# Patient Record
Sex: FEMALE | Race: WHITE | NOT HISPANIC OR LATINO | Employment: UNEMPLOYED | URBAN - METROPOLITAN AREA
[De-identification: names, ages, dates, MRNs, and addresses within clinical notes are randomized per-mention and may not be internally consistent; named-entity substitution may affect disease eponyms.]

---
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
## 2020-08-06 ENCOUNTER — VIRTUAL VISIT (OUTPATIENT)
Dept: SURGERY | Facility: CLINIC | Age: 20
End: 2020-08-06
Payer: COMMERCIAL

## 2020-08-06 VITALS — HEIGHT: 63 IN | BODY MASS INDEX: 36.86 KG/M2 | WEIGHT: 208 LBS

## 2020-08-06 DIAGNOSIS — K22.10 EROSIVE ESOPHAGITIS: Primary | ICD-10-CM

## 2020-08-06 RX ORDER — FERROUS GLUCONATE 324(37.5)
TABLET ORAL 2 TIMES DAILY
Status: ON HOLD | COMMUNITY
Start: 2020-01-13 | End: 2021-09-24

## 2020-08-06 RX ORDER — CALCIUM CARBONATE 500 MG/1
TABLET, CHEWABLE ORAL PRN
Status: ON HOLD | COMMUNITY
Start: 2019-09-07 | End: 2021-09-24

## 2020-08-06 RX ORDER — ONDANSETRON 4 MG/1
TABLET, FILM COATED ORAL EVERY 8 HOURS PRN
Status: ON HOLD | COMMUNITY
End: 2021-09-24

## 2020-08-06 ASSESSMENT — MIFFLIN-ST. JEOR: SCORE: 1687.61

## 2020-08-06 ASSESSMENT — PAIN SCALES - GENERAL: PAINLEVEL: SEVERE PAIN (7)

## 2020-08-06 NOTE — NURSING NOTE
"Chief Complaint   Patient presents with     Consult     New appointment.       Vitals:    08/06/20 1333   Weight: 94.3 kg (208 lb)   Height: 1.6 m (5' 3\")       Body mass index is 36.85 kg/m .                            AHMET JONES, EMT    "

## 2020-08-06 NOTE — LETTER
"8/6/2020       RE: Minerva Finch  [address]  [place] [address]     Dear Colleague,    Thank you for referring your patient, Minerva Finch, to the [facility] GENERAL SURGERY at [facility]. Please see a copy of my visit note below.    Minerva Finch is a 19 year old female who is being evaluated via a billable video visit.      The patient has been notified of following:     \"This video visit will be conducted via a call between you and your physician/provider. We have found that certain health care needs can be provided without the need for an in-person physical exam.  This service lets us provide the care you need with a video conversation.  If a prescription is necessary we can send it directly to your pharmacy.  If lab work is needed we can place an order for that and you can then stop by our lab to have the test done at a later time.    Video visits are billed at different rates depending on your insurance coverage.  Please reach out to your insurance provider with any questions.    If during the course of the call the physician/provider feels a video visit is not appropriate, you will not be charged for this service.\"    Patient has given verbal consent for Video visit? Yes  How would you like to obtain your AVS? MyChart  If you are dropped from the video visit, the video invite should be resent to: Text to cell phone: [phone]  Will anyone else be joining your video visit? No        Video-Visit Details    Type of service:  Video Visit    Video Start Time: 7:26 AM  Video End Time: 7:26 AM    Originating Location (pt. Location): Home    Distant Location (provider location):  [facility] GENERAL SURGERY     Platform used for Video Visit: Ann-Marie Gallegos MD          General Surgery Consultation Note    I was asked by Pari Tenorio to see this patient for the following problem:    CC: severe GERD.  Longstanding history; has had this since she was a " "child.    Minerva is a 20 yr old student in [place], originally from [place].    She has had severe reflux symptoms, which typically include reflux, nausea, vomiting, heartburn.  She has had a diagnosis of reflux since she was an infant and there are records in [facility] system dating back to that time.    Dr. Hair did endoscopy which is not available in records.    Dr. Chacko has also performed upper endoscopy in the past.    These have documented erosive gastritis from 25cm to 32cm (z-line).  Also have documented \"moderate\" hiatal hernia.    Has never had surgery for these.     Was referred for Nissen vs. Linx in [place], however this didn't happen due to insurance requiring surgery in [place].    Throws up everything she eats; vomits with blood.  Throws up and has blacked out.    Evaluation also included recent EGD/Barium swallow in [place].          Past Medical History:  Past Medical History:   Diagnosis Date     Abdominal pain      ADHD (attention deficit hyperactivity disorder)      Anxiety      Depression      Gastroesophageal reflux disease      Gastroesophageal reflux disease with esophagitis      Intractable vomiting with nausea      Obesity     BMI 37.33 using vitals from 2/3/17     Uncomplicated asthma      Patient Active Problem List   Diagnosis     Erosive esophagitis     Gastroesophageal reflux disease with esophagitis     Obesity with body mass index 30 or greater     Elevated transaminase level     Steatosis of liver     H/O: suicide attempt       Surgical History:  Past Surgical History:   Procedure Laterality Date     ENDOSCOPY       ESOPHAGOSCOPY, GASTROSCOPY, DUODENOSCOPY (EGD), COMBINED N/A 2/9/2017    Procedure: COMBINED ESOPHAGOSCOPY, GASTROSCOPY, DUODENOSCOPY (EGD), BIOPSY SINGLE OR MULTIPLE;  Surgeon: David Valle MD;  Location:  PEDS SEDATION      ESOPHAGOSCOPY, GASTROSCOPY, DUODENOSCOPY (EGD), COMBINED N/A 7/21/2017    Procedure: COMBINED ESOPHAGOSCOPY, GASTROSCOPY, DUODENOSCOPY " (EGD), BIOPSY SINGLE OR MULTIPLE;  Upper endoscopy with biopsy;  Surgeon: Yancy Chacko MD;  Location: [facility]      HC ESOPH/GAS REFLUX TEST W NASAL IMPED >1 HR N/A 4/3/2017    Procedure: ESOPHAGEAL IMPEDENCE FUNCTION TEST WITH 24 HOUR PH GREATER THAN 1 HOUR;  Surgeon: Yancy Chacko MD;  Location: [facility]      tonsillectomy and adenoidectomy         Medications:  Prior to Admission medications    Medication Sig Start Date End Date Taking? Authorizing Provider   calcium carbonate (TUMS) 500 MG chewable tablet  9/7/19  Yes Reported, Patient   Ferrous Gluconate 324 (37.5 Fe) MG TABS  1/13/20  Yes Reported, Patient   FLUoxetine (PROZAC) 20 MG capsule  4/16/20  Yes Reported, Patient   ondansetron (ZOFRAN) 4 MG tablet Take by mouth every 8 hours as needed for nausea   Yes Reported, Patient     Current Outpatient Medications   Medication Sig Dispense Refill     calcium carbonate (TUMS) 500 MG chewable tablet        Ferrous Gluconate 324 (37.5 Fe) MG TABS        FLUoxetine (PROZAC) 20 MG capsule        ondansetron (ZOFRAN) 4 MG tablet Take by mouth every 8 hours as needed for nausea         Allergies:  Allergies   Allergen Reactions     Omeprazole      Violent vomiting       Social History:  Social History     Socioeconomic History     Marital status: Single     Spouse name: None     Number of children: None     Years of education: None     Highest education level: None   Occupational History     None   Social Needs     Financial resource strain: None     Food insecurity     Worry: None     Inability: None     Transportation needs     Medical: None     Non-medical: None   Tobacco Use     Smoking status: Passive Smoke Exposure - Never Smoker     Smokeless tobacco: Never Used     Tobacco comment: mom smokes outside   Substance and Sexual Activity     Alcohol use: No     Alcohol/week: 0.0 standard drinks     Drug use: No     Sexual activity: None   Lifestyle     Physical  "activity     Days per week: None     Minutes per session: None     Stress: None   Relationships     Social connections     Talks on phone: None     Gets together: None     Attends Baptism service: None     Active member of club or organization: None     Attends meetings of clubs or organizations: None     Relationship status: None     Intimate partner violence     Fear of current or ex partner: None     Emotionally abused: None     Physically abused: None     Forced sexual activity: None   Other Topics Concern     None   Social History Narrative     None       Family History:  Family History   Problem Relation Age of Onset     Peptic Ulcer Disease Mother      GERD Mother      Arthritis Mother      Pancreatitis No family hx of      Migraines No family hx of        Review of Systems:  Gerd/reflux; vomiting; blacking out with these episodes.    Physical Examination:  Vital Signs: Ht 1.6 m (5' 3\")   Wt 94.3 kg (208 lb)   BMI 36.85 kg/m    HEENT: NCAT; MMM; EOMSI; PERRLA***  Lungs: Breathing unlabored***    Rest of examination deferred due to virtual visit.    Physical Exam Area of Interest:   n/a    Imaging:  Barium swallow.    Laboratory testing:  ***  Lab Results   Component Value Date    WBC 9.7 07/21/2017     Lab Results   Component Value Date    RBC 4.29 07/21/2017     Lab Results   Component Value Date    HGB 8.7 07/21/2017     Lab Results   Component Value Date    HCT 30.0 07/21/2017     No components found for: MCT  Lab Results   Component Value Date    MCV 70 07/21/2017     Lab Results   Component Value Date    MCH 20.3 07/21/2017     Lab Results   Component Value Date    MCHC 29.0 07/21/2017     Lab Results   Component Value Date    RDW 18.2 07/21/2017     Lab Results   Component Value Date     07/21/2017     Last Comprehensive Metabolic Panel:  No results found for: NA, POTASSIUM, CHLORIDE, CO2, ANIONGAP, GLC, BUN, CR, GFRESTIMATED, SHEEAB  INR/Prothrombin Time  Liver Function Studies -   Recent Labs "   Lab Test 07/21/17  1010   PROTTOTAL 6.8   ALBUMIN 3.2*   BILITOTAL 0.2   ALKPHOS 75   AST 15   ALT 27     [unfilled]    @LNK,[id number]@    Assessment:  ***    Discussion of Risks:   ***    Plan:  ***    No orders of the defined types were placed in this encounter.          Sergo Gallegos MD  Surgery  [phone] (hospital )  [phone] (clinic nurses)                  Again, thank you for allowing me to participate in the care of your patient.      Sincerely,    Sergo Gallegos MD

## 2020-08-06 NOTE — LETTER
"8/6/2020       RE: Minerva Finch  [address]  [place] [address]     Dear Colleague,    Thank you for referring your patient, Minerva Finch, to the [facility] GENERAL SURGERY at [facility]. Please see a copy of my visit note below.    Minerva Finch is a 19 year old female who is being evaluated via a billable video visit.      The patient has been notified of following:     \"This video visit will be conducted via a call between you and your physician/provider. We have found that certain health care needs can be provided without the need for an in-person physical exam.  This service lets us provide the care you need with a video conversation.  If a prescription is necessary we can send it directly to your pharmacy.  If lab work is needed we can place an order for that and you can then stop by our lab to have the test done at a later time.    Video visits are billed at different rates depending on your insurance coverage.  Please reach out to your insurance provider with any questions.    If during the course of the call the physician/provider feels a video visit is not appropriate, you will not be charged for this service.\"    Patient has given verbal consent for Video visit? Yes  How would you like to obtain your AVS? MyChart  If you are dropped from the video visit, the video invite should be resent to: Text to cell phone: [phone]  Will anyone else be joining your video visit? No  {If patient encounters technical issues they should call [phone] :[id number]}      Video-Visit Details    Type of service:  Video Visit    Video Start Time: {video visit start/end time:152948}  Video End Time: {video visit start/end time:152948}    Originating Location (pt. Location): {video visit patient location:960714::\"Home\"}    Distant Location (provider location):  [facility] SURGERY     Platform used for Video Visit: {Virtual Visit " "Platforms:908904::\"[facility]\"}    {signature options:282839}          General Surgery Consultation Note    I was asked by Pari Tenorio to see this patient for the following problem:    CC: severe GERD.  Longstanding history; has had this since she was a child.    HPI:  Location: ***  Severity: ***  Timing: ***  Duration: ***  Modifying Factors: ***  Associated Signs/Symptoms: ***      Has had problems since she was an infant.      Offered a Nissen fundoplication by surgeons in [place].  Was also offered Linx separately.      Throws up everything she eats; vomits with blood.  Throws up and blacks out.              Past Medical History:  Past Medical History:   Diagnosis Date     Abdominal pain      ADHD (attention deficit hyperactivity disorder)      Anxiety      Depression      Gastroesophageal reflux disease      Gastroesophageal reflux disease with esophagitis      Intractable vomiting with nausea      Obesity     BMI 37.33 using vitals from 2/3/17     Uncomplicated asthma      Patient Active Problem List   Diagnosis     Erosive esophagitis     Gastroesophageal reflux disease with esophagitis     Obesity with body mass index 30 or greater     Elevated transaminase level     Steatosis of liver     H/O: suicide attempt       Surgical History:  Past Surgical History:   Procedure Laterality Date     ENDOSCOPY       ESOPHAGOSCOPY, GASTROSCOPY, DUODENOSCOPY (EGD), COMBINED N/A 2/9/2017    Procedure: COMBINED ESOPHAGOSCOPY, GASTROSCOPY, DUODENOSCOPY (EGD), BIOPSY SINGLE OR MULTIPLE;  Surgeon: David Valle MD;  Location: [facility]      ESOPHAGOSCOPY, GASTROSCOPY, DUODENOSCOPY (EGD), COMBINED N/A 7/21/2017    Procedure: COMBINED ESOPHAGOSCOPY, GASTROSCOPY, DUODENOSCOPY (EGD), BIOPSY SINGLE OR MULTIPLE;  Upper endoscopy with biopsy;  Surgeon: Yancy Chacko MD;  Location: [facility]      HC ESOPH/GAS REFLUX TEST W NASAL IMPED >1 HR N/A 4/3/2017    Procedure: ESOPHAGEAL IMPEDENCE FUNCTION TEST " WITH 24 HOUR PH GREATER THAN 1 HOUR;  Surgeon: Yancy Chacko MD;  Location:  PEDS SEDATION      tonsillectomy and adenoidectomy         Medications:  Prior to Admission medications    Medication Sig Start Date End Date Taking? Authorizing Provider   calcium carbonate (TUMS) 500 MG chewable tablet  9/7/19  Yes Reported, Patient   Ferrous Gluconate 324 (37.5 Fe) MG TABS  1/13/20  Yes Reported, Patient   FLUoxetine (PROZAC) 20 MG capsule  4/16/20  Yes Reported, Patient   ondansetron (ZOFRAN) 4 MG tablet Take by mouth every 8 hours as needed for nausea   Yes Reported, Patient     Current Outpatient Medications   Medication Sig Dispense Refill     calcium carbonate (TUMS) 500 MG chewable tablet        Ferrous Gluconate 324 (37.5 Fe) MG TABS        FLUoxetine (PROZAC) 20 MG capsule        ondansetron (ZOFRAN) 4 MG tablet Take by mouth every 8 hours as needed for nausea         Allergies:  Allergies   Allergen Reactions     Omeprazole      Violent vomiting       Social History:  Social History     Socioeconomic History     Marital status: Single     Spouse name: None     Number of children: None     Years of education: None     Highest education level: None   Occupational History     None   Social Needs     Financial resource strain: None     Food insecurity     Worry: None     Inability: None     Transportation needs     Medical: None     Non-medical: None   Tobacco Use     Smoking status: Passive Smoke Exposure - Never Smoker     Smokeless tobacco: Never Used     Tobacco comment: mom smokes outside   Substance and Sexual Activity     Alcohol use: No     Alcohol/week: 0.0 standard drinks     Drug use: No     Sexual activity: None   Lifestyle     Physical activity     Days per week: None     Minutes per session: None     Stress: None   Relationships     Social connections     Talks on phone: None     Gets together: None     Attends Holiness service: None     Active member of club or organization: None  "    Attends meetings of clubs or organizations: None     Relationship status: None     Intimate partner violence     Fear of current or ex partner: None     Emotionally abused: None     Physically abused: None     Forced sexual activity: None   Other Topics Concern     None   Social History Narrative     None       Family History:  Family History   Problem Relation Age of Onset     Peptic Ulcer Disease Mother      GERD Mother      Arthritis Mother      Pancreatitis No family hx of      Migraines No family hx of        Review of Systems:  ***A 14 point review of systems was reviewed in our paper questionnaire. ***    Physical Examination:  Vital Signs: Ht 1.6 m (5' 3\")   Wt 94.3 kg (208 lb)   BMI 36.85 kg/m    HEENT: NCAT; MMM; EOMSI; PERRLA***  Lungs: Breathing unlabored***  Abdomen: soft/nontender/nondistended; incisions***    Physical Exam Area of Interest:   ***    Imaging:  ***    Laboratory testing:  ***  Lab Results   Component Value Date    WBC 9.7 07/21/2017     Lab Results   Component Value Date    RBC 4.29 07/21/2017     Lab Results   Component Value Date    HGB 8.7 07/21/2017     Lab Results   Component Value Date    HCT 30.0 07/21/2017     No components found for: MCT  Lab Results   Component Value Date    MCV 70 07/21/2017     Lab Results   Component Value Date    MCH 20.3 07/21/2017     Lab Results   Component Value Date    MCHC 29.0 07/21/2017     Lab Results   Component Value Date    RDW 18.2 07/21/2017     Lab Results   Component Value Date     07/21/2017     Last Comprehensive Metabolic Panel:  No results found for: NA, POTASSIUM, CHLORIDE, CO2, ANIONGAP, GLC, BUN, CR, GFRESTIMATED, SHEEBA  INR/Prothrombin Time  Liver Function Studies -   Recent Labs   Lab Test 07/21/17  1010   PROTTOTAL 6.8   ALBUMIN 3.2*   BILITOTAL 0.2   ALKPHOS 75   AST 15   ALT 27     [unfilled]    @LNK,[id number]@    Assessment:  ***    Discussion of Risks:   ***    Plan:  ***    No orders of the defined types were " placed in this encounter.          Sergo Gallegos MD  Surgery  [phone] (hospital )  [phone] (clinic nurses)                  Again, thank you for allowing me to participate in the care of your patient.      Sincerely,    Sergo Gallegos MD

## 2020-08-06 NOTE — LETTER
"8/6/2020      RE: Minerva Finch  [address] [address]       Minerva Finch is a 19 year old female who is being evaluated via a billable video visit.      The patient has been notified of following:     \"This video visit will be conducted via a call between you and your physician/provider. We have found that certain health care needs can be provided without the need for an in-person physical exam.  This service lets us provide the care you need with a video conversation.  If a prescription is necessary we can send it directly to your pharmacy.  If lab work is needed we can place an order for that and you can then stop by our lab to have the test done at a later time.    Video visits are billed at different rates depending on your insurance coverage.  Please reach out to your insurance provider with any questions.    If during the course of the call the physician/provider feels a video visit is not appropriate, you will not be charged for this service.\"    Patient has given verbal consent for Video visit? Yes  How would you like to obtain your AVS? MyChart  If you are dropped from the video visit, the video invite should be resent to: Text to cell phone: [phone]  Will anyone else be joining your video visit? No        Video-Visit Details    Type of service:  Video Visit    Video Start Time: 7:26 AM  Video End Time: 7:26 AM    Originating Location (pt. Location): Home    Distant Location (provider location):  [facility] GENERAL SURGERY     Platform used for Video Visit: [employer]    Sergo Gallegos MD          General Surgery Consultation Note    I was asked by Pari Tenorio to see this patient for the following problem:    CC: severe GERD.  Longstanding history; has had this since she was a child.    Minerva is a 20 yr old student in [place], originally from [place].    She has had severe reflux symptoms, which typically include reflux, nausea, vomiting, heartburn.  She has had a diagnosis " "of reflux since she was an infant and there are records in [facility] system dating back to that time.    Dr. Hair did endoscopy which is not available in records.    Dr. Chacko has also performed upper endoscopy in the past.    These have documented erosive gastritis from 25cm to 32cm (z-line).  Also have documented \"moderate\" hiatal hernia.    Has never had surgery for these.     Was referred for Nissen vs. Linx in [place], however this didn't happen due to insurance requiring surgery in [place].    Throws up everything she eats; vomits with blood.  Throws up and has blacked out.    Evaluation also included recent EGD/Barium swallow in [place].          Past Medical History:  Past Medical History:   Diagnosis Date     Abdominal pain      ADHD (attention deficit hyperactivity disorder)      Anxiety      Depression      Gastroesophageal reflux disease      Gastroesophageal reflux disease with esophagitis      Intractable vomiting with nausea      Obesity     BMI 37.33 using vitals from 2/3/17     Uncomplicated asthma      Patient Active Problem List   Diagnosis     Erosive esophagitis     Gastroesophageal reflux disease with esophagitis     Obesity with body mass index 30 or greater     Elevated transaminase level     Steatosis of liver     H/O: suicide attempt       Surgical History:  Past Surgical History:   Procedure Laterality Date     ENDOSCOPY       ESOPHAGOSCOPY, GASTROSCOPY, DUODENOSCOPY (EGD), COMBINED N/A 2/9/2017    Procedure: COMBINED ESOPHAGOSCOPY, GASTROSCOPY, DUODENOSCOPY (EGD), BIOPSY SINGLE OR MULTIPLE;  Surgeon: David Valle MD;  [facility]: [facility]      ESOPHAGOSCOPY, GASTROSCOPY, DUODENOSCOPY (EGD), COMBINED N/A 7/21/2017    Procedure: COMBINED ESOPHAGOSCOPY, GASTROSCOPY, DUODENOSCOPY (EGD), BIOPSY SINGLE OR MULTIPLE;  Upper endoscopy with biopsy;  Surgeon: Yancy Chacko MD;  Location: [facility]      HC ESOPH/GAS REFLUX TEST W NASAL IMPED >1 HR N/A 4/3/2017 "    Procedure: ESOPHAGEAL IMPEDENCE FUNCTION TEST WITH 24 HOUR PH GREATER THAN 1 HOUR;  Surgeon: Yancy Chacko MD;  Location: [facility] SEDATION      tonsillectomy and adenoidectomy         Medications:  Prior to Admission medications    Medication Sig Start Date End Date Taking? Authorizing Provider   calcium carbonate (TUMS) 500 MG chewable tablet  9/7/19  Yes Reported, Patient   Ferrous Gluconate 324 (37.5 Fe) MG TABS  1/13/20  Yes Reported, Patient   FLUoxetine (PROZAC) 20 MG capsule  4/16/20  Yes Reported, Patient   ondansetron (ZOFRAN) 4 MG tablet Take by mouth every 8 hours as needed for nausea   Yes Reported, Patient     Current Outpatient Medications   Medication Sig Dispense Refill     calcium carbonate (TUMS) 500 MG chewable tablet        Ferrous Gluconate 324 (37.5 Fe) MG TABS        FLUoxetine (PROZAC) 20 MG capsule        ondansetron (ZOFRAN) 4 MG tablet Take by mouth every 8 hours as needed for nausea         Allergies:  Allergies   Allergen Reactions     Omeprazole      Violent vomiting       Social History:  Social History     Socioeconomic History     Marital status: Single     Spouse name: None     Number of children: None     Years of education: None     Highest education level: None   Occupational History     None   Social Needs     Financial resource strain: None     Food insecurity     Worry: None     Inability: None     Transportation needs     Medical: None     Non-medical: None   Tobacco Use     Smoking status: Passive Smoke Exposure - Never Smoker     Smokeless tobacco: Never Used     Tobacco comment: mom smokes outside   Substance and Sexual Activity     Alcohol use: No     Alcohol/week: 0.0 standard drinks     Drug use: No     Sexual activity: None   Lifestyle     Physical activity     Days per week: None     Minutes per session: None     Stress: None   Relationships     Social connections     Talks on phone: None     Gets together: None     Attends Oriental orthodox service:  "None     Active member of club or organization: None     Attends meetings of clubs or organizations: None     Relationship status: None     Intimate partner violence     Fear of current or ex partner: None     Emotionally abused: None     Physically abused: None     Forced sexual activity: None   Other Topics Concern     None   Social History Narrative     None       Family History:  Family History   Problem Relation Age of Onset     Peptic Ulcer Disease Mother      GERD Mother      Arthritis Mother      Pancreatitis No family hx of      Migraines No family hx of        Review of Systems:  Gerd/reflux; vomiting; blacking out with these episodes.    Physical Examination:  Vital Signs: Ht 1.6 m (5' 3\")   Wt 94.3 kg (208 lb)   BMI 36.85 kg/m    HEENT: NCAT; MMM; EOMSI; PERRLA  Lungs: Breathing unlabored    Rest of examination deferred due to virtual visit.    Physical Exam Area of Interest:   n/a    Imaging:  Barium swallow.    Laboratory testing:  Lab Results   Component Value Date    WBC 9.7 07/21/2017     Lab Results   Component Value Date    RBC 4.29 07/21/2017     Lab Results   Component Value Date    HGB 8.7 07/21/2017     Lab Results   Component Value Date    HCT 30.0 07/21/2017     No components found for: MCT  Lab Results   Component Value Date    MCV 70 07/21/2017     Lab Results   Component Value Date    MCH 20.3 07/21/2017     Lab Results   Component Value Date    MCHC 29.0 07/21/2017     Lab Results   Component Value Date    RDW 18.2 07/21/2017     Lab Results   Component Value Date     07/21/2017     Last Comprehensive Metabolic Panel:  No results found for: NA, POTASSIUM, CHLORIDE, CO2, ANIONGAP, GLC, BUN, CR, GFRESTIMATED, SHEEBA  INR/Prothrombin Time  Liver Function Studies -   Recent Labs   Lab Test 07/21/17  1010   PROTTOTAL 6.8   ALBUMIN 3.2*   BILITOTAL 0.2   ALKPHOS 75   AST 15   ALT 27     [unfilled]    @LNK,[id number]@    Assessment:  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    Has a good size hiatal hernia.    Not responsive to acid blocker therapy.    Has been persistent medically since she was a young child, before she developed obesity as a medical problem.    Of note, she has had BMI above 40; she has lost weight recently.  Body mass index is 36.85 kg/m . currently.      Plan:  Will repeat endoscopy; will try to group evaluation for Minerva so that her travel requirements back to [place] (currently in [place]) is limited.    Option of gastric bypass and of Nissen discussed; will also review her case at esophageal conference and LINX is less of an option due to obesity history.    Risks of gastric bypass are NOT minimal and this option would have to be carefully considered.  This is one of the few patients now that I would consider this for due to the extreme esophagitis.  Sleeve as an intermediary could be considered; some will respond to this procedure though typically not considered a primary procedure for GERD.    No orders of the defined types were placed in this encounter.    Sergo Gallegos MD  Surgery  [phone] (hospital )  [phone] (clinic nurses)

## 2020-08-06 NOTE — PROGRESS NOTES
"Minerva Finch is a 19 year old female who is being evaluated via a billable video visit.      The patient has been notified of following:     \"This video visit will be conducted via a call between you and your physician/provider. We have found that certain health care needs can be provided without the need for an in-person physical exam.  This service lets us provide the care you need with a video conversation.  If a prescription is necessary we can send it directly to your pharmacy.  If lab work is needed we can place an order for that and you can then stop by our lab to have the test done at a later time.    Video visits are billed at different rates depending on your insurance coverage.  Please reach out to your insurance provider with any questions.    If during the course of the call the physician/provider feels a video visit is not appropriate, you will not be charged for this service.\"    Patient has given verbal consent for Video visit? Yes  How would you like to obtain your AVS? MyChart  If you are dropped from the video visit, the video invite should be resent to: Text to cell phone: [phone]  Will anyone else be joining your video visit? No        Video-Visit Details    Type of service:  Video Visit    Video Start Time: 7:26 AM  Video End Time: 7:26 AM    Originating Location (pt. Location): Home    Distant Location (provider location):  [facility] GENERAL SURGERY     Platform used for Video Visit: The Kernel    Sergo Gallegos MD          General Surgery Consultation Note    I was asked by Pari Tenorio to see this patient for the following problem:    CC: severe GERD.  Longstanding history; has had this since she was a child.    Minerva is a 20 yr old student in [place], originally from [place].    She has had severe reflux symptoms, which typically include reflux, nausea, vomiting, heartburn.  She has had a diagnosis of reflux since she was an infant and there are records in [facility] system dating " "back to that time.    Dr. Hair did endoscopy which is not available in records.    Dr. Chacko has also performed upper endoscopy in the past.    These have documented erosive gastritis from 25cm to 32cm (z-line).  Also have documented \"moderate\" hiatal hernia.    Has never had surgery for these.     Was referred for Nissen vs. Linx in [place], however this didn't happen due to insurance requiring surgery in [place].    Throws up everything she eats; vomits with blood.  Throws up and has blacked out.    Evaluation also included recent EGD/Barium swallow in [place].          Past Medical History:  Past Medical History:   Diagnosis Date     Abdominal pain      ADHD (attention deficit hyperactivity disorder)      Anxiety      Depression      Gastroesophageal reflux disease      Gastroesophageal reflux disease with esophagitis      Intractable vomiting with nausea      Obesity     BMI 37.33 using vitals from 2/3/17     Uncomplicated asthma      Patient Active Problem List   Diagnosis     Erosive esophagitis     Gastroesophageal reflux disease with esophagitis     Obesity with body mass index 30 or greater     Elevated transaminase level     Steatosis of liver     H/O: suicide attempt       Surgical History:  Past Surgical History:   Procedure Laterality Date     ENDOSCOPY       ESOPHAGOSCOPY, GASTROSCOPY, DUODENOSCOPY (EGD), COMBINED N/A 2/9/2017    Procedure: COMBINED ESOPHAGOSCOPY, GASTROSCOPY, DUODENOSCOPY (EGD), BIOPSY SINGLE OR MULTIPLE;  Surgeon: David Valle MD;  [facility]      ESOPHAGOSCOPY, GASTROSCOPY, DUODENOSCOPY (EGD), COMBINED N/A 7/21/2017    Procedure: COMBINED ESOPHAGOSCOPY, GASTROSCOPY, DUODENOSCOPY (EGD), BIOPSY SINGLE OR MULTIPLE;  Upper endoscopy with biopsy;  Surgeon: Yancy Chacko MD;  [facility]      HC ESOPH/GAS REFLUX TEST W NASAL IMPED >1 HR N/A 4/3/2017    Procedure: ESOPHAGEAL IMPEDENCE FUNCTION TEST WITH 24 HOUR PH GREATER THAN 1 " HOUR;  Surgeon: Yancy Chacko MD;  Location: [facility]      tonsillectomy and adenoidectomy         Medications:  Prior to Admission medications    Medication Sig Start Date End Date Taking? Authorizing Provider   calcium carbonate (TUMS) 500 MG chewable tablet  9/7/19  Yes Reported, Patient   Ferrous Gluconate 324 (37.5 Fe) MG TABS  1/13/20  Yes Reported, Patient   FLUoxetine (PROZAC) 20 MG capsule  4/16/20  Yes Reported, Patient   ondansetron (ZOFRAN) 4 MG tablet Take by mouth every 8 hours as needed for nausea   Yes Reported, Patient     Current Outpatient Medications   Medication Sig Dispense Refill     calcium carbonate (TUMS) 500 MG chewable tablet        Ferrous Gluconate 324 (37.5 Fe) MG TABS        FLUoxetine (PROZAC) 20 MG capsule        ondansetron (ZOFRAN) 4 MG tablet Take by mouth every 8 hours as needed for nausea         Allergies:  Allergies   Allergen Reactions     Omeprazole      Violent vomiting       Social History:  Social History     Socioeconomic History     Marital status: Single     Spouse name: None     Number of children: None     Years of education: None     Highest education level: None   Occupational History     None   Social Needs     Financial resource strain: None     Food insecurity     Worry: None     Inability: None     Transportation needs     Medical: None     Non-medical: None   Tobacco Use     Smoking status: Passive Smoke Exposure - Never Smoker     Smokeless tobacco: Never Used     Tobacco comment: mom smokes outside   Substance and Sexual Activity     Alcohol use: No     Alcohol/week: 0.0 standard drinks     Drug use: No     Sexual activity: None   Lifestyle     Physical activity     Days per week: None     Minutes per session: None     Stress: None   Relationships     Social connections     Talks on phone: None     Gets together: None     Attends Jain service: None     Active member of club or organization: None     Attends meetings of clubs  "or organizations: None     Relationship status: None     Intimate partner violence     Fear of current or ex partner: None     Emotionally abused: None     Physically abused: None     Forced sexual activity: None   Other Topics Concern     None   Social History Narrative     None       Family History:  Family History   Problem Relation Age of Onset     Peptic Ulcer Disease Mother      GERD Mother      Arthritis Mother      Pancreatitis No family hx of      Migraines No family hx of        Review of Systems:  Gerd/reflux; vomiting; blacking out with these episodes.    Physical Examination:  Vital Signs: Ht 1.6 m (5' 3\")   Wt 94.3 kg (208 lb)   BMI 36.85 kg/m    HEENT: NCAT; MMM; EOMSI; PERRLA  Lungs: Breathing unlabored    Rest of examination deferred due to virtual visit.    Physical Exam Area of Interest:   n/a    Imaging:  Barium swallow.    Laboratory testing:  Lab Results   Component Value Date    WBC 9.7 07/21/2017     Lab Results   Component Value Date    RBC 4.29 07/21/2017     Lab Results   Component Value Date    HGB 8.7 07/21/2017     Lab Results   Component Value Date    HCT 30.0 07/21/2017     No components found for: MCT  Lab Results   Component Value Date    MCV 70 07/21/2017     Lab Results   Component Value Date    MCH 20.3 07/21/2017     Lab Results   Component Value Date    MCHC 29.0 07/21/2017     Lab Results   Component Value Date    RDW 18.2 07/21/2017     Lab Results   Component Value Date     07/21/2017     Last Comprehensive Metabolic Panel:  No results found for: NA, POTASSIUM, CHLORIDE, CO2, ANIONGAP, GLC, BUN, CR, GFRESTIMATED, SHEEBA  INR/Prothrombin Time  Liver Function Studies -   Recent Labs   Lab Test 07/21/17  1010   PROTTOTAL 6.8   ALBUMIN 3.2*   BILITOTAL 0.2   ALKPHOS 75   AST 15   ALT 27     [unfilled]    @[id number]@    Assessment:  20 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    Has a good size hiatal hernia.    Not responsive to " acid blocker therapy.    Has been persistent medically since she was a young child, before she developed obesity as a medical problem.    Of note, she has had BMI above 40; she has lost weight recently.  Body mass index is 36.85 kg/m . currently.      Plan:  Will repeat endoscopy; will try to group evaluation for Minerva so that her travel requirements back to [place] (currently in [place]) is limited.    Option of gastric bypass and of Nissen discussed; will also review her case at esophageal conference and LINX is less of an option due to obesity history.    Risks of gastric bypass are NOT minimal and this option would have to be carefully considered.  This is one of the few patients now that I would consider this for due to the extreme esophagitis.  Sleeve as an intermediary could be considered; some will respond to this procedure though typically not considered a primary procedure for GERD.    No orders of the defined types were placed in this encounter.    Sergo Gallegos MD  Surgery  [phone] (hospital )  [phone] (clinic nurses)

## 2020-08-11 ENCOUNTER — TELEPHONE (OUTPATIENT)
Dept: SURGERY | Facility: CLINIC | Age: 20
End: 2020-08-11

## 2020-08-11 NOTE — TELEPHONE ENCOUNTER
Patient called to schedule surgery, seen 8/6 in clinic. Notes were supposed to be faxed from [facility] for Dr. Gallegos to review.     There is no case request in yet and no clinic note to know what to schedule. I told her I would see if the notes were faxed and request Dr. Gallegos to do dictation. I will call her back as soon as I have some answers. [phone].

## 2020-08-12 ENCOUNTER — TELEPHONE (OUTPATIENT)
Dept: SURGERY | Facility: CLINIC | Age: 20
End: 2020-08-12

## 2020-08-12 NOTE — TELEPHONE ENCOUNTER
Per Dr. Gallegos, patient will be discussed at Esophageal Conference next Tuesday. Discussion will include possibility of Linx procedure versus Nissen fundoplication versus sleeve gastrectomy versus Rashida-en-Y gastric bypass. VM message left.

## 2020-08-17 ENCOUNTER — TELEPHONE (OUTPATIENT)
Dept: ENDOCRINOLOGY | Facility: CLINIC | Age: 20
End: 2020-08-17

## 2020-08-17 ENCOUNTER — MYC MEDICAL ADVICE (OUTPATIENT)
Dept: SURGERY | Facility: CLINIC | Age: 20
End: 2020-08-17

## 2020-08-17 DIAGNOSIS — K21.00 GASTROESOPHAGEAL REFLUX DISEASE WITH ESOPHAGITIS: ICD-10-CM

## 2020-08-17 DIAGNOSIS — K22.10 EROSIVE ESOPHAGITIS: Primary | ICD-10-CM

## 2020-08-17 RX ORDER — HYOSCYAMINE SULFATE 0.125 MG
0.125-0.25 TABLET ORAL EVERY 4 HOURS PRN
Qty: 40 TABLET | Refills: 1 | Status: SHIPPED | OUTPATIENT
Start: 2020-08-17 | End: 2020-08-17

## 2020-08-17 RX ORDER — HYOSCYAMINE SULFATE 0.125 MG
0.125-0.25 TABLET ORAL EVERY 4 HOURS PRN
Qty: 40 TABLET | Refills: 1 | Status: SHIPPED | OUTPATIENT
Start: 2020-08-17 | End: 2020-08-24 | Stop reason: SINTOL

## 2020-08-17 NOTE — TELEPHONE ENCOUNTER
Patient states she has been vomiting today with anything she eats or drinks (cold or lukewarm fluids).  Has tried chewable Pepcid without relief. Is allergic to PPI medications.  Is taking Zofran for the nausea regularly.  Has tried Carafate in the past with minimal decrease in symptoms.  Has had a few emesis episodes with small amount of blood streaks.  States she feels like esophagus is irritated. Has tried a lidocaine gargle with some relief.    States her current weight is 197.  Down from her weight at her first visit (210).    Encouraged patient to try warmer fluids such as a genia lemon tea which may promote relaxation of esophagus and stomach.  Can also try hard peppermint candy to suck on but not chew and swallow.    Patient is currently at work and will try other suggestions after she gets off work today.  Will call tomorrow with update.  Patient advised to seek immediate help at ED if she starts vomiting blood.    Intake information routed to Kaya Richmond NP for additional orders.

## 2020-08-18 ENCOUNTER — TELEPHONE (OUTPATIENT)
Dept: SURGERY | Facility: CLINIC | Age: 20
End: 2020-08-18

## 2020-08-18 NOTE — TELEPHONE ENCOUNTER
Minerva Finch was discussed at Esophageal Conference on August 18, 2020.      Recommended Plan:   Consultation appointment with Dr. Ten Callahan MD, Esophagologist and Repeat GI studies with [facility] GI department.    Patient will be contacted by Loraine Lane RN regarding plan of care.  Follow-up appointments to be arranged by Martita Romo RN - GI Department.    Phone Call to Patient: Left message for patient to return call regarding decision from esophageal conference.

## 2020-08-21 ENCOUNTER — TELEPHONE (OUTPATIENT)
Dept: SURGERY | Facility: CLINIC | Age: 20
End: 2020-08-21

## 2020-08-21 ENCOUNTER — PATIENT OUTREACH (OUTPATIENT)
Dept: GASTROENTEROLOGY | Facility: CLINIC | Age: 20
End: 2020-08-21

## 2020-08-21 NOTE — TELEPHONE ENCOUNTER
Return Call to patient.  Patient left message stating she was having symptoms while taking the Levsin.  States she became short of breath and felt very anxious.  Patient also stated the medication was helping with the n/v. Left message for patient instructing her to stop taking the Levsin.

## 2020-08-21 NOTE — PROGRESS NOTES
Reached out to pt to schedule virtual visit with Dr. Callahan per MDs recommendation for pt following esophageal conference.  Left  for call back.

## 2020-08-24 ENCOUNTER — PATIENT OUTREACH (OUTPATIENT)
Dept: GASTROENTEROLOGY | Facility: CLINIC | Age: 20
End: 2020-08-24

## 2020-08-24 ENCOUNTER — TELEPHONE (OUTPATIENT)
Dept: SURGERY | Facility: CLINIC | Age: 20
End: 2020-08-24

## 2020-08-24 NOTE — TELEPHONE ENCOUNTER
Patient called wondering what tests she was going to have to repeat with GI.  Informed patient I would reach out to GI to connect with her to see if they could provide the information.     Patient states she is disappointed that the Levsin caused SOB because it helped curb her GI symptoms.  Is inquiring if there is a different medication.  Request forwarded to Kaya Richmond NP.  Will follow-up with patient if a different medication available.

## 2020-08-24 NOTE — TELEPHONE ENCOUNTER
RECORDS RECEIVED FROM: [facility] General Surgery- Dr. Sergo Gallegos    DATE RECEIVED: 2020   NOTES STATUS DETAILS   OFFICE NOTE from referring provider Internal 2020 Office visit with Dr. Gallegos    OFFICE NOTE from other specialist Internal/ Care Everywhere 3/14/17 Office visit with Dr. Frannie Rausch ([facility])     2/3/17 Office visit with Dr. Yancy Chacko ([facility])    1/3/17 Office visit with Dr. Stuart Soria ([facility])    DISCHARGE SUMMARY from hospital N/A    OPERATIVE REPORT Received  Barium Swallow: 19     MEDICATION LIST Internal         ENDOSCOPY  Received EGD: 19 ([facility] [place])   EGD: 16 ([facility])    COLONOSCOPY N/A    ERCP N/A    EUS N/A    STOOL TESTING N/A    PERTINENT LABS Received    PATHOLOGY REPORTS (RELATED) Care Everywhere/ Internal  16 ([facility])     17, 17   IMAGING (CT, MRI, EGD) Received/ Internal  NM Gastric Emptying: 3/14/17  US Abdomen: 17     [facility]:  - NM Gastric Emptyin19    [facility]:  - XR Upper GI: 16 (in PACS)      REFERRAL INFORMATION    Date referral was placed: 2020   Date all records received:    Date records were scanned into EPIC:    Date records were sent to Provider to review:    Date and recommendation received from provider:  LETTER SENT  SCHEDULE APPOINTMENT   Date patient was contacted to schedule: 2020     Action 2020 2:27pm -Bhao    Action Taken Fax requests for images sent to:  - [facility] ([phone]); [employer]       Action 2020 2:16pm -Bhao   Action Taken Fax request sent to [facility] for image.      Action 2020 11:41am -Bhao    Action Taken Called [facility]; reached automated message and it gave a fax number of [phone].  Re-fax request to [phone].     [employer]     Action 20 10:44 AM - Loraine    Action Taken  Imaging disc received from [facility] and sent to [facility] to be uploaded into PACs.

## 2020-09-08 ENCOUNTER — PATIENT OUTREACH (OUTPATIENT)
Dept: GASTROENTEROLOGY | Facility: CLINIC | Age: 20
End: 2020-09-08

## 2020-09-08 DIAGNOSIS — K22.10 EROSIVE ESOPHAGITIS: Primary | ICD-10-CM

## 2020-09-08 DIAGNOSIS — K21.00 GASTROESOPHAGEAL REFLUX DISEASE WITH ESOPHAGITIS: ICD-10-CM

## 2020-09-08 NOTE — PROGRESS NOTES
Reached out to pt to facilitate coordination of tests per John Gallegos and [place]  In communication with Endoscopy scheduling     EGD with biopsy: scheduling in process   High resolution manometry and 24hr pH impedance OFF therapy:   scheduling in process   Barium esophagram with a barium tablet:   scheduling in process   Telemed visit: 9/18 with Dr Callahan

## 2020-09-09 ENCOUNTER — HOSPITAL ENCOUNTER (OUTPATIENT)
Facility: CLINIC | Age: 20
End: 2020-09-09
Attending: INTERNAL MEDICINE | Admitting: INTERNAL MEDICINE

## 2020-09-09 ENCOUNTER — TELEPHONE (OUTPATIENT)
Dept: GASTROENTEROLOGY | Facility: CLINIC | Age: 20
End: 2020-09-09

## 2020-09-09 DIAGNOSIS — Z11.59 ENCOUNTER FOR SCREENING FOR OTHER VIRAL DISEASES: Primary | ICD-10-CM

## 2020-09-09 NOTE — TELEPHONE ENCOUNTER
Patient is scheduled for Manometry/EGd with Dr. Desean Callahan    Spoke with: patient    Date of Procedure: 10-28 and 10-29    Location: [address] [facility]    Sedation Type none/ MAC    Informed patient they will need an adult  yes    Informed Patient of COVID Test Requirement yes    Preferred Pharmacy for Pre Prescription chart    Confirmed Nurse will call to complete assessment yes    Additional comments: patient coming from [place]

## 2020-09-10 ENCOUNTER — TELEPHONE (OUTPATIENT)
Dept: GASTROENTEROLOGY | Facility: CLINIC | Age: 20
End: 2020-09-10

## 2020-09-10 NOTE — TELEPHONE ENCOUNTER
Patient is scheduled for EGD with Dr. DRISCOLL    Spoke with: patient    Date of Procedure: 10-28-20    Location: asc    Sedation Type mac    Informed patient they will need an adult  yes    Informed Patient of COVID Test Requirement yes    Preferred Pharmacy for Pre Prescription chart    Confirmed Nurse will call to complete assessment yes    Additional comments: Martita assisted with all the coordinating of the appts for this patient.

## 2020-09-10 NOTE — TELEPHONE ENCOUNTER
Melanie Alejandro 9/10/20 1:53PM   Action Taken CSS received CD from [facility]- CD given to Palak to upload

## 2020-09-11 ENCOUNTER — PATIENT OUTREACH (OUTPATIENT)
Dept: GASTROENTEROLOGY | Facility: CLINIC | Age: 20
End: 2020-09-11

## 2020-09-11 NOTE — PROGRESS NOTES
Reached out to pt to confirm appointments including esophagram and assist in scheduling covid test for tests week of 10/26. Left  for call back.

## 2020-09-18 ENCOUNTER — PRE VISIT (OUTPATIENT)
Dept: GASTROENTEROLOGY | Facility: CLINIC | Age: 20
End: 2020-09-18

## 2020-09-18 PROBLEM — K44.9 HIATAL HERNIA: Status: ACTIVE | Noted: 2019-09-09

## 2020-09-19 PROBLEM — R11.10 REGURGITATION OF FOOD: Status: ACTIVE | Noted: 2020-09-19

## 2020-09-19 PROBLEM — R11.10 POSTPRANDIAL VOMITING: Status: ACTIVE | Noted: 2020-09-19

## 2020-09-28 ENCOUNTER — TELEPHONE (OUTPATIENT)
Dept: GASTROENTEROLOGY | Facility: CLINIC | Age: 20
End: 2020-09-28

## 2020-09-28 NOTE — TELEPHONE ENCOUNTER
Prior Authorization Retail Medication Request    Medication/Dose: RABEprazole (ACIPHEX) 20 MG EC tablet   Take 20 mg twice daily, taken 30-60 minutes prior to meals  ICD code (if different than what is on RX):    Previously Tried and Failed:    Rationale:    erosive esophagitis, GERD   Therapies tried:  -- Side effects to omeprazole and related medications - reports violent vomiting, can't get out of bed, blacks out due to feeling poorly    Insurance Name: [employer] [place]    Insurance ID: [id number]       Pharmacy Information (if different than what is on RX)  Name:    Phone:

## 2020-09-28 NOTE — TELEPHONE ENCOUNTER
Central Prior Authorization Team   Phone: [phone]      PA Initiation    Medication: RABEprazole (ACIPHEX) 20 MG EC tablet   Insurance Company: [employer]* - Phone [phone] Fax [phone]  Pharmacy Filling the Rx: [id number] - [place], [place] - [address] AT [facility] [address]  Filling Pharmacy Phone: [phone]  Filling Pharmacy Fax:    Start Date: 9/28/2020

## 2020-09-29 NOTE — TELEPHONE ENCOUNTER
Prior Authorization Approval    Authorization Effective Date: 6/29/2020  Authorization Expiration Date: 3/28/2021  Medication: RABEprazole (ACIPHEX) 20 MG EC tablet - APPROVED  Approved Dose/Quantity:   Reference #:     Insurance Company: [employer] - Phone [phone] Fax [phone]  Expected CoPay:       CoPay Card Available:      Foundation Assistance Needed:    Which Pharmacy is filling the prescription (Not needed for infusion/clinic administered): [id number] - [place], [place] - [address] AT [facility] [address]  Pharmacy Notified: Yes-Left message with approval, process, fill, and notify patient when ready  Patient Notified: No

## 2020-10-19 ENCOUNTER — MEDICAL CORRESPONDENCE (OUTPATIENT)
Dept: HEALTH INFORMATION MANAGEMENT | Facility: CLINIC | Age: 20
End: 2020-10-19

## 2020-10-23 ENCOUNTER — MYC MEDICAL ADVICE (OUTPATIENT)
Dept: SURGERY | Facility: AMBULATORY SURGERY CENTER | Age: 20
End: 2020-10-23

## 2020-10-26 ENCOUNTER — HOSPITAL ENCOUNTER (OUTPATIENT)
Dept: NUCLEAR MEDICINE | Facility: CLINIC | Age: 20
Setting detail: NUCLEAR MEDICINE
Discharge: HOME | End: 2020-10-26
Attending: PHYSICIAN ASSISTANT | Admitting: PHYSICIAN ASSISTANT
Payer: COMMERCIAL

## 2020-10-26 DIAGNOSIS — Z11.59 ENCOUNTER FOR SCREENING FOR OTHER VIRAL DISEASES: ICD-10-CM

## 2020-10-26 DIAGNOSIS — K21.00 GASTROESOPHAGEAL REFLUX DISEASE WITH ESOPHAGITIS: ICD-10-CM

## 2020-10-26 DIAGNOSIS — R11.10 POSTPRANDIAL VOMITING: ICD-10-CM

## 2020-10-26 PROCEDURE — 78264 GASTRIC EMPTYING IMG STUDY: CPT | Mod: 26 | Performed by: RADIOLOGY

## 2020-10-26 PROCEDURE — A9541 TC99M SULFUR COLLOID: HCPCS | Performed by: PHYSICIAN ASSISTANT

## 2020-10-26 PROCEDURE — 78264 GASTRIC EMPTYING IMG STUDY: CPT

## 2020-10-26 PROCEDURE — U0003 INFECTIOUS AGENT DETECTION BY NUCLEIC ACID (DNA OR RNA); SEVERE ACUTE RESPIRATORY SYNDROME CORONAVIRUS 2 (SARS-COV-2) (CORONAVIRUS DISEASE [COVID-19]), AMPLIFIED PROBE TECHNIQUE, MAKING USE OF HIGH THROUGHPUT TECHNOLOGIES AS DESCRIBED BY CMS-2020-01-R: HCPCS | Performed by: INTERNAL MEDICINE

## 2020-10-26 PROCEDURE — 343N000001 HC RX 343: Performed by: PHYSICIAN ASSISTANT

## 2020-10-26 RX ADMIN — Medication 2 MCI.: at 09:16

## 2020-10-27 ENCOUNTER — ANCILLARY PROCEDURE (OUTPATIENT)
Dept: GENERAL RADIOLOGY | Facility: CLINIC | Age: 20
End: 2020-10-27
Attending: INTERNAL MEDICINE
Payer: COMMERCIAL

## 2020-10-27 DIAGNOSIS — K22.10 EROSIVE ESOPHAGITIS: ICD-10-CM

## 2020-10-27 DIAGNOSIS — K21.00 GASTROESOPHAGEAL REFLUX DISEASE WITH ESOPHAGITIS: ICD-10-CM

## 2020-10-27 LAB
SARS-COV-2 RNA SPEC QL NAA+PROBE: NOT DETECTED
SPECIMEN SOURCE: NORMAL

## 2020-10-27 PROCEDURE — 74220 X-RAY XM ESOPHAGUS 1CNTRST: CPT | Mod: GC | Performed by: RADIOLOGY

## 2020-10-28 ENCOUNTER — ANESTHESIA (OUTPATIENT)
Dept: SURGERY | Facility: AMBULATORY SURGERY CENTER | Age: 20
End: 2020-10-28

## 2020-10-28 ENCOUNTER — HOSPITAL ENCOUNTER (OUTPATIENT)
Facility: AMBULATORY SURGERY CENTER | Age: 20
Discharge: HOME | End: 2020-10-28
Attending: INTERNAL MEDICINE | Admitting: INTERNAL MEDICINE
Payer: COMMERCIAL

## 2020-10-28 ENCOUNTER — ANESTHESIA EVENT (OUTPATIENT)
Dept: SURGERY | Facility: AMBULATORY SURGERY CENTER | Age: 20
End: 2020-10-28

## 2020-10-28 VITALS
RESPIRATION RATE: 18 BRPM | TEMPERATURE: 96.9 F | HEIGHT: 63 IN | HEART RATE: 61 BPM | DIASTOLIC BLOOD PRESSURE: 76 MMHG | WEIGHT: 200 LBS | OXYGEN SATURATION: 99 % | BODY MASS INDEX: 35.44 KG/M2 | SYSTOLIC BLOOD PRESSURE: 134 MMHG

## 2020-10-28 VITALS — HEART RATE: 109 BPM

## 2020-10-28 LAB
HCG UR QL: NEGATIVE
INTERNAL QC OK POCT: YES
UPPER GI ENDOSCOPY: NORMAL

## 2020-10-28 PROCEDURE — 88312 SPECIAL STAINS GROUP 1: CPT | Mod: 26 | Performed by: PATHOLOGY

## 2020-10-28 PROCEDURE — 88305 TISSUE EXAM BY PATHOLOGIST: CPT | Mod: 26 | Performed by: PATHOLOGY

## 2020-10-28 PROCEDURE — 81025 URINE PREGNANCY TEST: CPT | Performed by: PATHOLOGY

## 2020-10-28 PROCEDURE — 43239 EGD BIOPSY SINGLE/MULTIPLE: CPT

## 2020-10-28 RX ORDER — GLYCOPYRROLATE 0.2 MG/ML
INJECTION, SOLUTION INTRAMUSCULAR; INTRAVENOUS PRN
Status: DISCONTINUED | OUTPATIENT
Start: 2020-10-28 | End: 2020-10-28

## 2020-10-28 RX ORDER — PROPOFOL 10 MG/ML
INJECTION, EMULSION INTRAVENOUS PRN
Status: DISCONTINUED | OUTPATIENT
Start: 2020-10-28 | End: 2020-10-28

## 2020-10-28 RX ORDER — FLUMAZENIL 0.1 MG/ML
0.2 INJECTION, SOLUTION INTRAVENOUS
Status: DISCONTINUED | OUTPATIENT
Start: 2020-10-28 | End: 2020-10-29 | Stop reason: HOSPADM

## 2020-10-28 RX ORDER — LIDOCAINE 40 MG/G
CREAM TOPICAL
Status: DISCONTINUED | OUTPATIENT
Start: 2020-10-28 | End: 2020-10-28 | Stop reason: HOSPADM

## 2020-10-28 RX ORDER — LIDOCAINE HYDROCHLORIDE 20 MG/ML
INJECTION, SOLUTION INFILTRATION; PERINEURAL PRN
Status: DISCONTINUED | OUTPATIENT
Start: 2020-10-28 | End: 2020-10-28

## 2020-10-28 RX ORDER — ONDANSETRON 4 MG/1
4 TABLET, ORALLY DISINTEGRATING ORAL EVERY 6 HOURS PRN
Status: DISCONTINUED | OUTPATIENT
Start: 2020-10-28 | End: 2020-10-29 | Stop reason: HOSPADM

## 2020-10-28 RX ORDER — SIMETHICONE
LIQUID (ML) MISCELLANEOUS PRN
Status: DISCONTINUED | OUTPATIENT
Start: 2020-10-28 | End: 2020-10-28 | Stop reason: HOSPADM

## 2020-10-28 RX ORDER — SODIUM CHLORIDE, SODIUM LACTATE, POTASSIUM CHLORIDE, CALCIUM CHLORIDE 600; 310; 30; 20 MG/100ML; MG/100ML; MG/100ML; MG/100ML
500 INJECTION, SOLUTION INTRAVENOUS CONTINUOUS
Status: DISCONTINUED | OUTPATIENT
Start: 2020-10-28 | End: 2020-10-28 | Stop reason: HOSPADM

## 2020-10-28 RX ORDER — ONDANSETRON 2 MG/ML
4 INJECTION INTRAMUSCULAR; INTRAVENOUS
Status: DISCONTINUED | OUTPATIENT
Start: 2020-10-28 | End: 2020-10-28 | Stop reason: HOSPADM

## 2020-10-28 RX ORDER — ONDANSETRON 2 MG/ML
4 INJECTION INTRAMUSCULAR; INTRAVENOUS EVERY 6 HOURS PRN
Status: DISCONTINUED | OUTPATIENT
Start: 2020-10-28 | End: 2020-10-29 | Stop reason: HOSPADM

## 2020-10-28 RX ORDER — PROCHLORPERAZINE MALEATE 10 MG
10 TABLET ORAL EVERY 6 HOURS PRN
Status: DISCONTINUED | OUTPATIENT
Start: 2020-10-28 | End: 2020-10-29 | Stop reason: HOSPADM

## 2020-10-28 RX ORDER — NALOXONE HYDROCHLORIDE 0.4 MG/ML
.1-.4 INJECTION, SOLUTION INTRAMUSCULAR; INTRAVENOUS; SUBCUTANEOUS
Status: DISCONTINUED | OUTPATIENT
Start: 2020-10-28 | End: 2020-10-29 | Stop reason: HOSPADM

## 2020-10-28 RX ORDER — PROPOFOL 10 MG/ML
INJECTION, EMULSION INTRAVENOUS CONTINUOUS PRN
Status: DISCONTINUED | OUTPATIENT
Start: 2020-10-28 | End: 2020-10-28

## 2020-10-28 RX ADMIN — PROPOFOL 80 MG: 10 INJECTION, EMULSION INTRAVENOUS at 13:07

## 2020-10-28 RX ADMIN — PROPOFOL 50 MG: 10 INJECTION, EMULSION INTRAVENOUS at 13:10

## 2020-10-28 RX ADMIN — SODIUM CHLORIDE, SODIUM LACTATE, POTASSIUM CHLORIDE, CALCIUM CHLORIDE 500 ML: 600; 310; 30; 20 INJECTION, SOLUTION INTRAVENOUS at 12:07

## 2020-10-28 RX ADMIN — PROPOFOL 150 MCG/KG/MIN: 10 INJECTION, EMULSION INTRAVENOUS at 13:10

## 2020-10-28 RX ADMIN — PROPOFOL 40 MG: 10 INJECTION, EMULSION INTRAVENOUS at 13:12

## 2020-10-28 RX ADMIN — GLYCOPYRROLATE 0.2 MG: 0.2 INJECTION, SOLUTION INTRAMUSCULAR; INTRAVENOUS at 13:04

## 2020-10-28 RX ADMIN — LIDOCAINE HYDROCHLORIDE 70 MG: 20 INJECTION, SOLUTION INFILTRATION; PERINEURAL at 13:04

## 2020-10-28 RX ADMIN — PROPOFOL 30 MG: 10 INJECTION, EMULSION INTRAVENOUS at 13:08

## 2020-10-28 ASSESSMENT — MIFFLIN-ST. JEOR: SCORE: 1646.32

## 2020-10-28 NOTE — H&P
Minerva Finch  [id number]  female  20 year old      Reason for procedure/surgery: history of erosive esophagitis, egd    Patient Active Problem List   Diagnosis     Erosive esophagitis     Gastroesophageal reflux disease with esophagitis     Obesity (BMI 30-39.9)     Elevated transaminase level     Fatty liver     H/O: suicide attempt     Hiatal hernia     Postprandial vomiting     Regurgitation of food       Past Surgical History:    Past Surgical History:   Procedure Laterality Date     ENDOSCOPY       ESOPHAGOSCOPY, GASTROSCOPY, DUODENOSCOPY (EGD), COMBINED N/A 2/9/2017    Procedure: COMBINED ESOPHAGOSCOPY, GASTROSCOPY, DUODENOSCOPY (EGD), BIOPSY SINGLE OR MULTIPLE;  Surgeon: David Valle MD;  [facility]      ESOPHAGOSCOPY, GASTROSCOPY, DUODENOSCOPY (EGD), COMBINED N/A 7/21/2017    Procedure: COMBINED ESOPHAGOSCOPY, GASTROSCOPY, DUODENOSCOPY (EGD), BIOPSY SINGLE OR MULTIPLE;  Upper endoscopy with biopsy;  Surgeon: Yancy Chacko MD;  [facility]      HC ESOPH/GAS REFLUX TEST W NASAL IMPED >1 HR N/A 4/3/2017    Procedure: ESOPHAGEAL IMPEDENCE FUNCTION TEST WITH 24 HOUR PH GREATER THAN 1 HOUR;  Surgeon: Yancy Chacko MD;  [facility]      tonsillectomy and adenoidectomy         Past Medical History:   Past Medical History:   Diagnosis Date     Abdominal pain      ADHD (attention deficit hyperactivity disorder)      Anxiety      Depression      Gastroesophageal reflux disease      Gastroesophageal reflux disease with esophagitis      Intractable vomiting with nausea      Obesity     BMI 37.33 using vitals from 2/3/17     Uncomplicated asthma        Social History:   Social History     Tobacco Use     Smoking status: Passive Smoke Exposure - Never Smoker     Smokeless tobacco: Never Used     Tobacco comment: mom smokes outside   Substance Use Topics     Alcohol use: No     Alcohol/week: 0.0 standard drinks       Family History:  "  Family History   Problem Relation Age of Onset     Peptic Ulcer Disease Mother      GERD Mother      Arthritis Mother      Pancreatitis No family hx of      Migraines No family hx of        Allergies:   Allergies   Allergen Reactions     Kiwi Extract Shortness Of Breath and Anaphylaxis     Hives and tongue swells     Levsin [Hyoscyamine] Shortness Of Breath and Anaphylaxis     Baclofen      Vomiting     Omeprazole      Violent vomiting       Active Medications:   Current Outpatient Medications   Medication Sig Dispense Refill     calcium carbonate (TUMS) 500 MG chewable tablet        Ferrous Gluconate 324 (37.5 Fe) MG TABS        FLUoxetine (PROZAC) 20 MG capsule        ondansetron (ZOFRAN) 4 MG tablet Take by mouth every 8 hours as needed for nausea       RABEprazole (ACIPHEX) 20 MG EC tablet Take 20 mg twice daily, taken 30-60 minutes prior to meals 60 tablet 3       Systemic Review:   CONSTITUTIONAL: NEGATIVE for fever, chills, change in weight  ENT/MOUTH: NEGATIVE for ear, mouth and throat problems  RESP: NEGATIVE for significant cough or SOB  CV: NEGATIVE for chest pain, palpitations or peripheral edema    Physical Examination:   Vital Signs: BP (!) 153/79   Pulse 76   Temp 96.8  F (36  C) (Temporal)   Resp 16   Ht 1.6 m (5' 3\")   Wt 90.7 kg (200 lb)   LMP 06/28/2020   SpO2 98%   Breastfeeding No   BMI 35.43 kg/m    GENERAL: healthy, alert and no distress  NECK: no adenopathy, no asymmetry, masses, or scars  RESP: lungs clear to auscultation - no rales, rhonchi or wheezes  CV: regular rate and rhythm, normal S1 S2, no S3 or S4, no murmur, click or rub, no peripheral edema and peripheral pulses strong  ABDOMEN: soft, nontender, no hepatosplenomegaly, no masses and bowel sounds normal  MS: no gross musculoskeletal defects noted, no edema    Plan: Appropriate to proceed as scheduled.      Juni Callahan DO  10/28/2020    PCP:  Pari Tenorio    "

## 2020-10-28 NOTE — ANESTHESIA POSTPROCEDURE EVALUATION
Anesthesia POST Procedure Evaluation    Patient: Minerva Finch   MRN:     [id number] Gender:   female   Age:    20 year old :      2000        Preoperative Diagnosis: Erosive esophagitis [K22.10]   Procedure(s):  ESOPHAGOGASTRODUODENOSCOPY, WITH BIOPSY   Postop Comments: No value filed.     Anesthesia Type: MAC       Disposition: Outpatient   Postop Pain Control: Uneventful            Sign Out: Well controlled pain   PONV: No   Neuro/Psych: Uneventful            Sign Out: Acceptable/Baseline neuro status   Airway/Respiratory: Uneventful            Sign Out: AIRWAY IN SITU/Resp. Support   CV/Hemodynamics: Uneventful            Sign Out: Acceptable CV status   Other NRE: NONE   DID A NON-ROUTINE EVENT OCCUR? No         Last Anesthesia Record Vitals:  CRNA VITALS  10/28/2020 1250 - 10/28/2020 1350      10/28/2020             Ht Rate:  96    SpO2:  100 %          Last PACU Vitals:  Vitals Value Taken Time   /80 10/28/20 1328   Temp 36  C (96.8  F) 10/28/20 1328   Pulse 76 10/28/20 1328   Resp 18 10/28/20 1328   SpO2 100 % 10/28/20 1328   Temp src     NIBP     Pulse     SpO2     Resp     Temp     Ht Rate     Temp 2           Electronically Signed By: Maged Mathur MD, 2020, 2:25 PM

## 2020-10-28 NOTE — ANESTHESIA CARE TRANSFER NOTE
Patient: Minerva Finch    Procedure(s):  ESOPHAGOGASTRODUODENOSCOPY, WITH BIOPSY    Diagnosis: Erosive esophagitis [K22.10]  Diagnosis Additional Information: No value filed.    Anesthesia Type:   MAC     Note:  Airway :Room Air  Patient transferred to:Phase II  Handoff Report: Identifed the Patient, Identified the Reponsible Provider, Reviewed the pertinent medical history, Discussed the surgical course, Reviewed Intra-OP anesthesia mangement and issues during anesthesia, Set expectations for post-procedure period and Allowed opportunity for questions and acknowledgement of understanding      Vitals: (Last set prior to Anesthesia Care Transfer)    CRNA VITALS  10/28/2020 1250 - 10/28/2020 1335      10/28/2020             Ht Rate:  96    SpO2:  100 %                Electronically Signed By: JERARDO Roy CRNA  October 28, 2020  1:35 PM

## 2020-10-28 NOTE — ANESTHESIA PREPROCEDURE EVALUATION
"Anesthesia Pre-Procedure Evaluation    Patient: Minerva Finch   MRN:     [id number] Gender:   female   Age:    20 year old :      2000        Preoperative Diagnosis: Erosive esophagitis [K22.10]   Procedure(s):  ESOPHAGOGASTRODUODENOSCOPY (EGD)     LABS:  CBC:   Lab Results   Component Value Date    WBC 9.7 2017    WBC 6.9 2007    HGB 8.7 (L) 2017    HGB 7.7 (L) 2017    HCT 30.0 (L) 2017    HCT 35.3 2007     2017     2007     BMP: No results found for: NA, POTASSIUM, CHLORIDE, CO2, BUN, CR, GLC  COAGS: No results found for: PTT, INR, FIBR  POC:   Lab Results   Component Value Date    HCG Negative 10/28/2020     OTHER:   Lab Results   Component Value Date    ALBUMIN 3.2 (L) 2017    PROTTOTAL 6.8 2017    ALT 27 2017    AST 15 2017    ALKPHOS 75 2017    BILITOTAL 0.2 2017        Preop Vitals    BP Readings from Last 3 Encounters:   10/28/20 (!) 153/79   17 112/80   17 126/56 (94 %, Z = 1.52 /  14 %, Z = -1.07)*     *BP percentiles are based on the 2017 AAP Clinical Practice Guideline for girls    Pulse Readings from Last 3 Encounters:   10/28/20 76   17 77   17 70      Resp Readings from Last 3 Encounters:   10/28/20 16   17 19   17 18    SpO2 Readings from Last 3 Encounters:   10/28/20 98%   17 99%   17 100%      Temp Readings from Last 1 Encounters:   10/28/20 36  C (96.8  F) (Temporal)    Ht Readings from Last 1 Encounters:   10/28/20 1.6 m (5' 3\")      Wt Readings from Last 1 Encounters:   10/28/20 90.7 kg (200 lb)    Estimated body mass index is 35.43 kg/m  as calculated from the following:    Height as of this encounter: 1.6 m (5' 3\").    Weight as of this encounter: 90.7 kg (200 lb).     LDA:  Peripheral IV 17 Left Hand (Active)   Number of days: 1357       Peripheral IV 17 Left Lower forearm (Active)   Number of days: 1195       Airway - " Adult/Peds (Active)   Number of days: 1357        Past Medical History:   Diagnosis Date     Abdominal pain      ADHD (attention deficit hyperactivity disorder)      Anxiety      Depression      Gastroesophageal reflux disease      Gastroesophageal reflux disease with esophagitis      Intractable vomiting with nausea      Obesity     BMI 37.33 using vitals from 2/3/17     Uncomplicated asthma       Past Surgical History:   Procedure Laterality Date     ENDOSCOPY       ESOPHAGOSCOPY, GASTROSCOPY, DUODENOSCOPY (EGD), COMBINED N/A 2/9/2017    Procedure: COMBINED ESOPHAGOSCOPY, GASTROSCOPY, DUODENOSCOPY (EGD), BIOPSY SINGLE OR MULTIPLE;  Surgeon: David Valle MD;  Location: [facility]      ESOPHAGOSCOPY, GASTROSCOPY, DUODENOSCOPY (EGD), COMBINED N/A 7/21/2017    Procedure: COMBINED ESOPHAGOSCOPY, GASTROSCOPY, DUODENOSCOPY (EGD), BIOPSY SINGLE OR MULTIPLE;  Upper endoscopy with biopsy;  Surgeon: Yancy Chacko MD;  Location: [facility]      HC ESOPH/GAS REFLUX TEST W NASAL IMPED >1 HR N/A 4/3/2017    Procedure: ESOPHAGEAL IMPEDENCE FUNCTION TEST WITH 24 HOUR PH GREATER THAN 1 HOUR;  Surgeon: Yancy Chacko MD;  Location: [facility]      tonsillectomy and adenoidectomy        Allergies   Allergen Reactions     Kiwi Extract Shortness Of Breath and Anaphylaxis     Hives and tongue swells     Levsin [Hyoscyamine] Shortness Of Breath and Anaphylaxis     Baclofen      Vomiting     Omeprazole      Violent vomiting                 PHYSICAL EXAM:   Mental Status/Neuro: A/A/O   Airway: Facies: Feasible  Mallampati: II  Mouth/Opening: Full  TM distance: > 6 cm  Neck ROM: Full   Respiratory:   Resp. Rate: Normal     Resp. Effort: Normal      CV:   Rate: Age appropriate  Edema: None   Comments:      Dental: Retainer  Retainer: Lower                Assessment:   ASA SCORE: 2    H&P: History and physical reviewed and following examination; no interval change.    NPO Status: NPO  Appropriate     Plan:   Anes. Type:  MAC   Pre-Medication: None   Induction:  N/a   Airway: Native Airway   Access/Monitoring: PIV   Maintenance: N/a     Postop Plan:   Postop Pain: None  Postop Sedation/Airway: Not planned  Disposition: Outpatient     PONV Management: Adult Risk Factors: Female   Prevention: Ondansetron, Dexamethasone     CONSENT: Direct conversation   Plan and risks discussed with: Patient                      Maged Mathur MD     ANESTHESIA PREOP EVALUATION    PROCEDURE: Procedure(s):  ESOPHAGOGASTRODUODENOSCOPY (EGD)    HPI: Minerva Finch is a 20 year old female who presents for above procedure .    PAST MEDICAL HISTORY:    Past Medical History:   Diagnosis Date     Abdominal pain      ADHD (attention deficit hyperactivity disorder)      Anxiety      Depression      Gastroesophageal reflux disease      Gastroesophageal reflux disease with esophagitis      Intractable vomiting with nausea      Obesity     BMI 37.33 using vitals from 2/3/17     Uncomplicated asthma        PAST SURGICAL HISTORY:    Past Surgical History:   Procedure Laterality Date     ENDOSCOPY       ESOPHAGOSCOPY, GASTROSCOPY, DUODENOSCOPY (EGD), COMBINED N/A 2/9/2017    Procedure: COMBINED ESOPHAGOSCOPY, GASTROSCOPY, DUODENOSCOPY (EGD), BIOPSY SINGLE OR MULTIPLE;  Surgeon: David Valle MD;  Location: [facility]      ESOPHAGOSCOPY, GASTROSCOPY, DUODENOSCOPY (EGD), COMBINED N/A 7/21/2017    Procedure: COMBINED ESOPHAGOSCOPY, GASTROSCOPY, DUODENOSCOPY (EGD), BIOPSY SINGLE OR MULTIPLE;  Upper endoscopy with biopsy;  Surgeon: Yancy Chacko MD;  Location: [facility]      HC ESOPH/GAS REFLUX TEST W NASAL IMPED >1 HR N/A 4/3/2017    Procedure: ESOPHAGEAL IMPEDENCE FUNCTION TEST WITH 24 HOUR PH GREATER THAN 1 HOUR;  Surgeon: Yancy Chacko MD;  Location: [facility]      tonsillectomy and adenoidectomy         SOCIAL HISTORY:       Social History     Tobacco Use     Smoking status:  Passive Smoke Exposure - Never Smoker     Smokeless tobacco: Never Used     Tobacco comment: mom smokes outside   Substance Use Topics     Alcohol use: No     Alcohol/week: 0.0 standard drinks       ALLERGIES:     Allergies   Allergen Reactions     Kiwi Extract Shortness Of Breath and Anaphylaxis     Hives and tongue swells     Levsin [Hyoscyamine] Shortness Of Breath and Anaphylaxis     Baclofen      Vomiting     Omeprazole      Violent vomiting       MEDICATIONS:     (Not in a hospital admission)      Current Outpatient Medications   Medication Sig Dispense Refill     calcium carbonate (TUMS) 500 MG chewable tablet        Ferrous Gluconate 324 (37.5 Fe) MG TABS        FLUoxetine (PROZAC) 20 MG capsule        ondansetron (ZOFRAN) 4 MG tablet Take by mouth every 8 hours as needed for nausea       RABEprazole (ACIPHEX) 20 MG EC tablet Take 20 mg twice daily, taken 30-60 minutes prior to meals 60 tablet 3       Current Outpatient Medications Ordered in Epic   Medication Sig Dispense Refill     calcium carbonate (TUMS) 500 MG chewable tablet        Ferrous Gluconate 324 (37.5 Fe) MG TABS        FLUoxetine (PROZAC) 20 MG capsule        ondansetron (ZOFRAN) 4 MG tablet Take by mouth every 8 hours as needed for nausea       RABEprazole (ACIPHEX) 20 MG EC tablet Take 20 mg twice daily, taken 30-60 minutes prior to meals 60 tablet 3     Current Facility-Administered Medications Ordered in Epic   Medication Dose Route Frequency Provider Last Rate Last Dose     barium sulfate (EZ-DISK) tablet 700 mg  700 mg Oral Once Uli Urias MD         lidocaine (LMX4) kit   Topical Q1H PRN Juni Callahan DO         lidocaine (XYLOCAINE) 2 % topical gel    PRN Yancy Chacko MD   1 Tube at 04/03/17 0905     lidocaine 1 % 0.1-1 mL  0.1-1 mL Other Q1H PRN Juni Callahan DO         ondansetron (ZOFRAN) injection 4 mg  4 mg Intravenous Once PRN Juni Callahan DO         sod bicarbonate-citric acid-simethicone (EZ  GAS) 2.21-1.53-0.04 g packet 4 g  4 g Oral Once Uli Urias MD         sodium chloride (PF) 0.9% PF flush 3 mL  3 mL Intracatheter q1 min prn Juni Callahan DO         sodium chloride (PF) 0.9% PF flush 3 mL  3 mL Intracatheter Q8H Juni Callahan DO           PHYSICAL EXAM:    Vitals: T 96.8, P 76, /79, R 16, SpO2 98%, Weight   Wt Readings from Last 2 Encounters:   10/28/20 90.7 kg (200 lb)   09/18/20 93 kg (205 lb)       See doc flowsheet    NPO STATUS: see doc flowsheet    LABS:    BMP:  No results for input(s): NA, POTASSIUM, CHLORIDE, CO2, BUN, CR, GLC, SHEEBA in the last 07578 hours.    LFTs:   Recent Labs   Lab Test 07/21/17  1010   PROTTOTAL 6.8   ALBUMIN 3.2*   BILITOTAL 0.2   ALKPHOS 75   AST 15   ALT 27       CBC:   Recent Labs   Lab Test 07/21/17  1010   WBC 9.7   RBC 4.29   HGB 8.7*   HCT 30.0*   MCV 70*   MCH 20.3*   MCHC 29.0*   RDW 18.2*          Coags:  No results for input(s): INR, PTT, FIBR in the last 05202 hours.    Imaging:  No orders to display       Maged Mathur MD  Anesthesiology Staff  Pager [phone]    10/28/2020  12:04 PM

## 2020-10-29 ENCOUNTER — OFFICE VISIT (OUTPATIENT)
Dept: GASTROENTEROLOGY | Facility: CLINIC | Age: 20
End: 2020-10-29
Payer: COMMERCIAL

## 2020-10-29 VITALS
WEIGHT: 215.2 LBS | HEART RATE: 83 BPM | DIASTOLIC BLOOD PRESSURE: 83 MMHG | OXYGEN SATURATION: 98 % | TEMPERATURE: 98 F | HEIGHT: 63 IN | SYSTOLIC BLOOD PRESSURE: 137 MMHG | BODY MASS INDEX: 38.13 KG/M2

## 2020-10-29 DIAGNOSIS — K21.9 GASTROESOPHAGEAL REFLUX DISEASE WITHOUT ESOPHAGITIS: Primary | ICD-10-CM

## 2020-10-29 PROCEDURE — 91037 ESOPH IMPED FUNCTION TEST: CPT

## 2020-10-29 PROCEDURE — 91010 ESOPHAGUS MOTILITY STUDY: CPT

## 2020-10-29 ASSESSMENT — MIFFLIN-ST. JEOR: SCORE: 1715.27

## 2020-10-29 NOTE — PROGRESS NOTES
"Non-Invasive GI Procedure Visit    Minerva Finch is a 20 year old female with history of Gastroesophageal reflux disease without esophagitis.   Patient stated reason for procedure: GERD  COVID-19 Test Performed within 48-72hrs of the procedure:  Yes. COVID-19 result was NEGATIVE.    COVID-19 PCR Results    COVID-19 PCR Results 10/26/20   COVID-19 Virus PCR to [facility] - Result Not Detected   COVID-19 Virus PCR to [facility] - Source Nasopharyngeal      Comments are available for some flowsheets but are not being displayed.         COVID-19 Antibody Results, Testing for Immunity    COVID-19 Antibody Results, Testing for Immunity   No data to display.             Pre-Procedure Assessment  Patient presents to clinic today for Esophageal Motility Study    Referring Provider: Dr. Juni Callahan  Does patient report taking a PPI (omeprazole, pantoprazole, rabeprazole, lansoprazole, esomeprazole, dexlansoprazole)? Yes. Is patient taking a PPI twice daily? Yes  Does patient report taking a H2 blocker (ranitidine, or famotidine)? Yes  Does patient report taking opioids?No  Patient reported that last food and/or drink was last consumed 4 hours ago.  .    Patient Hx  Patient's history, medications and allergies were reviewed.   Patient has undergone previous endoscopy.    Height: 5' 3\"   Weight: 215 lbs 3.2 oz    Patient Active Problem List    Diagnosis Date Noted     Postprandial vomiting 09/19/2020     Priority: Medium     Regurgitation of food 09/19/2020     Priority: Medium     Hiatal hernia 09/09/2019     Priority: Medium     Elevated transaminase level 02/05/2017     Priority: Medium     Erosive esophagitis 01/03/2017     Priority: Medium     Gastroesophageal reflux disease with esophagitis 01/03/2017     Priority: Medium     Obesity (BMI 30-39.9) 01/03/2017     Priority: Medium     Fatty liver 01/03/2017     Priority: Medium     H/O: suicide attempt 10/14/2015     Priority: Medium     Spent 2 weeks at [facility]. Hx of " SIB leading up to it after she lost a friend from school in Nov 2014. Someone at school told her she should try to kill herself and so she attempted on 10/14/15.         Prior to Admission medications    Medication Sig Start Date End Date Taking? Authorizing Provider   calcium carbonate (TUMS) 500 MG chewable tablet  9/7/19  Yes Reported, Patient   Ferrous Gluconate 324 (37.5 Fe) MG TABS  1/13/20  Yes Reported, Patient   FLUoxetine (PROZAC) 20 MG capsule  4/16/20  Yes Reported, Patient   ondansetron (ZOFRAN) 4 MG tablet Take by mouth every 8 hours as needed for nausea   Yes Reported, Patient   RABEprazole (ACIPHEX) 20 MG EC tablet Take 20 mg twice daily, taken 30-60 minutes prior to meals 9/18/20  Yes Chikis See PA-C     Allergies   Allergen Reactions     Kiwi Extract Shortness Of Breath and Anaphylaxis     Hives and tongue swells     Levsin [Hyoscyamine] Shortness Of Breath and Anaphylaxis     Baclofen      Vomiting     Omeprazole      Violent vomiting     Past Medical History:   Diagnosis Date     Abdominal pain      ADHD (attention deficit hyperactivity disorder)      Anxiety      Depression      Gastroesophageal reflux disease      Gastroesophageal reflux disease with esophagitis      Intractable vomiting with nausea      Obesity     BMI 37.33 using vitals from 2/3/17     Uncomplicated asthma      Past Surgical History:   Procedure Laterality Date     ENDOSCOPY       ESOPHAGOSCOPY, GASTROSCOPY, DUODENOSCOPY (EGD), COMBINED N/A 2/9/2017    Procedure: COMBINED ESOPHAGOSCOPY, GASTROSCOPY, DUODENOSCOPY (EGD), BIOPSY SINGLE OR MULTIPLE;  Surgeon: David Valle MD;  Location: [facility]      ESOPHAGOSCOPY, GASTROSCOPY, DUODENOSCOPY (EGD), COMBINED N/A 7/21/2017    Procedure: COMBINED ESOPHAGOSCOPY, GASTROSCOPY, DUODENOSCOPY (EGD), BIOPSY SINGLE OR MULTIPLE;  Upper endoscopy with biopsy;  Surgeon: Yancy Chacko MD;  Location: [facility]      ESOPHAGOSCOPY, GASTROSCOPY,  DUODENOSCOPY (EGD), COMBINED N/A 10/28/2020    Procedure: ESOPHAGOGASTRODUODENOSCOPY, WITH BIOPSY;  Surgeon: Juni Callahan DO;  [place] [facility] [place]      ESOPH/GAS REFLUX TEST W NASAL IMPED >1 HR N/A 4/3/2017    Procedure: ESOPHAGEAL IMPEDENCE FUNCTION TEST WITH 24 HOUR PH GREATER THAN 1 HOUR;  Surgeon: Yancy Chacko MD;  Location:  [facility]      tonsillectomy and adenoidectomy       Family History   Problem Relation Age of Onset     Peptic Ulcer Disease Mother      GERD Mother      Arthritis Mother      Pancreatitis No family hx of      Migraines No family hx of      Social History     Tobacco Use     Smoking status: Passive Smoke Exposure - Never Smoker     Smokeless tobacco: Never Used     Tobacco comment: mom smokes outside   Substance Use Topics     Alcohol use: No     Alcohol/week: 0.0 standard drinks        Pre-Procedure Education & Consent  Procedure education was provided to: Patient  Teaching method: Explanation  Barriers to learning: No Barrier    Patient indicated understanding of pre-procedure instruction and appropriate consent was obtained and documented.    Procedure Documentation: Esophageal Motility     Motility catheter was placed via right nare to 50 cm and normal saline swallows given per protocol. Motility catheter was removed at the end of test.    Discharge instructions given to patient.    Notification of pending test results sent to provider for interpretation. Please reference scanned document for final interpretation of results. Patient will follow up with referring provider for test results.    Brittany Saldivar RN on 10/29/2020 at 9:51 AM

## 2020-10-29 NOTE — PATIENT INSTRUCTIONS
Esophogeal Motility Study  1. Resume regular diet.  2. You may have a bloody nose or sore throat after the procedure.  3. If you have questions call [phone] from 7:00am-5:00pm.  For afterhours questions call GI doctor on call at [phone].

## 2020-11-02 ENCOUNTER — PATIENT OUTREACH (OUTPATIENT)
Dept: GASTROENTEROLOGY | Facility: CLINIC | Age: 20
End: 2020-11-02

## 2020-11-02 NOTE — PROGRESS NOTES
Returned pt call regarding follow up questions. Left vm including clinic number to schedule follow up virtual visit with Dr Callahan.

## 2020-11-05 LAB — COPATH REPORT: NORMAL

## 2020-11-09 NOTE — PROGRESS NOTES
"Minerva Finch is a 20 year old female who is being evaluated via a billable video visit.      The patient has been notified of following:     \"This video visit will be conducted via a call between you and your physician/provider. We have found that certain health care needs can be provided without the need for an in-person physical exam.  This service lets us provide the care you need with a video conversation.  If a prescription is necessary we can send it directly to your pharmacy.  If lab work is needed we can place an order for that and you can then stop by our lab to have the test done at a later time.    Video visits are billed at different rates depending on your insurance coverage.  Please reach out to your insurance provider with any questions.    If during the course of the call the physician/provider feels a video visit is not appropriate, you will not be charged for this service.\"    Patient has given verbal consent for Video visit? Yes  How would you like to obtain your AVS? MyChart  If you are dropped from the video visit, the video invite should be resent to: Text to cell phone: [phone]  Will anyone else be joining your video visit? No      During this virtual visit the patient is located in [place], patient verifies this as the location during the entirety of this visit.       Video-Visit Details    Type of service:  Video Visit    Video Start Time: 07:20 AM  Video End Time:  07:55 AM    Originating Location (pt. Location): Other parent's house in [place]    Distant Location (provider location):  [facility]     Platform used for Video Visit: Ann-Marie Callahan DO    Gastroenterology Visit for: Minerva Finch 2000   MRN: [id number]     Reason for Visit:  chief complaint    Referred by: No ref. provider found  /   Patient Care Team:  Pari Tenorio MD as PCP - General (Family Practice)  Stuart Soria (Pediatric Gastroenterology)  Yancy Chacko, " MD as MD (Pediatrics)  Flor Hicks, RN as Nurse Coordinator (Pediatric Gastroenterology)  Sergo Gallegos MD as Assigned Surgical Provider  Juni Callahan DO as MD (Gastroenterology)    History of Present Illness:   Minerva Finch is 20 year old female with history of erosive esophagitis, GERD, anxiety/depression with history of suicide attempt, class II obesity who is presenting as a follow up patient in consultation at the request of Dr. Sergo Gallegos with a chief complaint of erosive esophagitis, GERD..  ---------------------------------------------------------------  11/10/20  The patient states that she is taking aciphex twice a day. The patient states she was off of the aciphex for two days prior to the EGD. Patient has dysphagia if she is not sitting upright. When upright she has no dysphagia. No vomiting with aciphex. Not currently on gaviscon. Patient has intermittent nausea that is associated with regurgitation. It does not start as a true emesis however the regurgitation leads to vomiting.   ----------------------------------  9/18/20  Minerva Finch reports symptoms since infancy, managed with Zantac every day with good control for a period of time.     Since beginning of 2020, has had increased symptoms, with significant worsening in the summer months of 2020, which prompted visit with Dr. Sergo Gallegos in our Bariatric surgery department.  had increased food goes down, anything and everything comes up to into her mouth, almost immediately after swallowing, bland and/or acidic. As soon as doing eating, will try to force swallows to keep things down, but ultimately will usually need to vomit within 10-15 minutes eating. This can contain sour material, yellow liquid, along with stomach contents sometimes tinged in the last 1.5 months. She estimates up to 1/4 cup of blood at times, including yesterday. This occurs with every meal or intake. She avoids eating at work due to this. She will try  two meals per day. Sticks to bland foods.  This occurs mainly with food, but can occur with liquids sometimes.    Outside of these symptoms, has acid reflux, and/or sensation of gas bubble substernally, varies depending on the day, at least multiple times per day.    Nightly nocturnal symptoms awakes with severe burning, reflux of acid to the throat. Sometimes regurgitates small pieces of digested food material. Sleeps with 3 pillows minium, but also will try do sleeping in chair at 90 degrees. Has tried blocks to elevated HOB but hasn't helped.    Currently using Tums 2000 mg of Tums or more per day, effective but short-lived    At time of worsening symptoms. She has lost about 23 lb at time of worsening symptoms, now fluctuates between 195-205 lb. Symptoms have not improved with weight loss.  Muscle soreness in stomach after vomiting, otherwise no abdominal pain. Intermittent early satiety but some days has increased appetite after vomiting. She will try to snack in those circumstances.    Doesn't remember how she was doing prior to symptoms worsened. This is definitely the worst its ever been. Records are sparse between in [id number] because she reports her MD told her she cannot help her and needed to wait until she could see an adult gastroenterologist.    Typical bowel pattern is once per day soft form without straining, no blood or melena.    History of anxiety and depression, feels this is under good control right now, though understandably feels that her current GI issues do cause her some emotional distress. Feels safe with herself and at home.    No alcohol, tobacco, or marijuana use.    On oral iron reports had 2 units of pRBC this summer, reports ongoing low hemoglobin. Feels dizzy after throwing.  Intermittent position lightheadedness. No chest pain, SOB, palpitations.    Therapies tried:  -- Side effects to omeprazole and related medications - reports violent vomiting, can't get out of bed, blacks out  "due to feeling poorly. Last used in September 2019, previous to that was used in 2017.  -- Zantac ineffective  -- currently using Tums 2000 mg of Tums or more per day, effective but short-lived  -- Now trying cimetidine (Tagamet) 200 mg, started recently,once daily, for the last 2 days.  -- Not using Gaviscon, kind of helped   -- Carafate didn't help  -- Peppermint candy (sucks and spits out) helps temporarily     Summary of previous testing:  EGD 11/21/2016 - circumferential erosions involving distal 5 cm of esophagus, pathology showing moderate chronic inflammation (on PPI 20 mg daily at time of EGD). Started on Carafate following procedure and increaed to pantoprazole BID, Gaviscon.    EGD 2/9/2017- LA grade D esophagitis, with distal 1 cm of esophageal mucosa suspicious for Mojica's    PH impedence testing 4/3/2017- poor acid control (on Nexium 20 mg daily), prolonged acid exposure time, DeMeester score 100, with positive symptom association (nausea and abdominal pain)    EGD 7/2017: severe esophagitis with bleeding 25-32 cm from incisors.    esophagram showed reflux to thoracic outlet    EGD 9/9/2019:  \"from mid-esophagus down there were multiple linear ulcerations and erosions leading down to mild-sized hiatal hernia\" per report, was on Protonix BID (dose unclear) and Carafate  ---------------------------------------------------------------     Minerva Finch denies dysphagia, odynophagia, heartburn/reflux, nausea, vomiting, early satiety, abdominal pain, abdominal distension/bloating, diarrhea, constipation, hematochezia, or melena. No unintentional weight loss.     Wt Readings from Last 5 Encounters:   10/29/20 97.6 kg (215 lb 3.2 oz)   10/28/20 90.7 kg (200 lb)   09/18/20 93 kg (205 lb)   08/06/20 94.3 kg (208 lb) (98 %, Z= 2.06)*   07/21/17 97.4 kg (214 lb 11.7 oz) (99 %, Z= 2.18)*     * Growth percentiles are based on CDC (Girls, 2-20 Years) data.     Brief Esophageal Dysphagia Questionnaire (BEDQ):  We " would like to ask about your dysphagia symptoms over the past 30 days, 6 months, and 12 months.  Please think about your symptom experiences and choose the best answer.      Over the past 14 days, on average, how often have you had the following?  If your response falls between two categories, please make your best guess.  If you are unable to eat the type of food in the question, please check  Cannot eat this.    Cannot  Eat This 0  Rarely/ Never 1  Once or twice a month 2  1-2 times per week 3  3-5 times per week 4  Daily or almost daily 5  Several times per day   Trouble eating solid food (meat, bread, vegetables)         x    Trouble eating soft foods (yogurt, jello, pudding)        x     Trouble swallowing liquids       x        Pain while swallowing  ---   x      Coughing or choking while swallowing foods or liquids --- x          SCORE: 9    Over the past 14 days, on average, how would you rate your discomfort or pain during swallowing? If you are unable to eat the type of food in the question, please check  Cannot eat this.    Cannot Eat This 0  None 1  Very Mild 2  Mild 3  Moderate 4  Moderately Severe    5  Severe   Eating solid food   (meat, bread, vegetables)    x      Eating soft foods   (yogurt, jello, pudding)    x      Drinking liquids  x          SCORE: 4    Approximately how many times in the past 12 months have you:   Had food stuck in your throat or esophagus for a period lasting longer than 30 minutes? no  Had to visit the emergency room because of food stuck in your throat or esophagus? no    Eckardt Score:   Score Dysphagia Regurgitation Retrosternal Pain Weight Loss (kg)   0 None None None None   1 Occasional Occasional Occasional <5   2 Daily Daily Daily 5-10   3 Each meal Each meal Each meal >10     SCORE: 5    Any hardcopy questionnaire completed by the patient regarding SSQ, BEDQ, or Eckardt Score have been scanned into the media section of Epic.     STUDIES & PROCEDURES:    EGD:   Date:  10/28/20  Impression:  LA Grade D (one or more mucosal breaks involving at least 75% of        esophageal circumference) esophagitis with no bleeding was found 26 to        31 cm from the incisors. This was biopsied with a cold forceps for        histology. Verification of patient identification for the specimen was        done. Estimated blood loss: none.        The gastroesophageal flap valve was visualized endoscopically and        classified as Hill Grade IV (no fold, wide open lumen, hiatal hernia        present).        A 5 cm hiatal hernia was present. Diaphragmatic pinch at 36cm        The entire examined stomach was normal. Biopsies were taken with a cold        forceps for histology. Verification of patient identification for the        specimen was done. Estimated blood loss: none.        The duodenal bulb, first portion of the duodenum, second portion of the        duodenum and examined duodenum were normal.   Pathology Report:  A. STOMACH, BIOPSY:   - Antral type mucosa with features of mild reactive gastropathy   - Unremarkable gastric body-type mucosa   - No H. pylori like organisms identified on routine staining   - Negative for intestinal metaplasia and dysplasia     B. ESOPHAGEAL ULCER, BIOPSY:   - Acute esophagitis with focal ulceration   - No evidence of viral cytopathic effect on routine stain   - Negative for intestinal metaplasia and dysplasia     Colonoscopy:  Date:  Impression:    Pathology Report:     EndoFLIP directed at the UES or LES (8cm (EF-325) balloon length or 16cm (EF-322) balloon length):   Date:  8cm balloon  Balloon inflation Balloon pressure CSA (mm^2) DI (mm^2/mmHg) Dmin (mm) Compliance   20 (ladmark ID)        30        40        50           16cm balloon  Balloon inflation Balloon pressure CSA (mm^2) DI (mm^2/mmHg) Dmin (mm) Compliance   30 (ladmark ID)        40        50        60        70           High Resolution Manometry:  Date:10/29/20  Impressions   The baseline  tone of the lower esophageal sphincter was hypotensive. The lower esophageal sphincter was able to relax appropriately as measured by the IRP. The EGJ morphology was type 2. There was peristalsis visible. The DCI, DL, and contractile pattern were not within normal limits. 3/10 swallows were failed, 6/10 swallows were weak, 1/10 was normal. Rapid water swallows were performed with less than normal augmentation. Reclining liquid swallows were performed. Upright liquid swallows were performed with no change in manometric pattern. Bolus transit as measured by impedance was complete in 0/10 swallows. This is most consistent with severe ineffective esophageal motility.     Based on the most recent [place] Classification, the findings are most consistent with severe ineffective esophageal motility. Low impedance and the appearance of incomplete bolus transit may be due to the significant erosive esophagitis rather than true incomplete bolus transit.     Dr Juni Callahan     PH/Impedance: ON therapy  Date: 2017  Impression:  - Gastric acid control: Poor, 80% evaluation pH<4  - Esophageal acid exposure: Prolonged with percent clearance time of 32.2% and longest episode of clearance 49.8 min  - Total number of reflux episodes 280 (270 upright and 10 recumbent)  - Symptom Association: The majority of reported symptoms are associated with reflux events.  Abdominal pain symptom index of 92% (12 occurences and 11 reflux related) and nausea symptom index of 75% (12 occurences 9 reflux related).     Bravo:  48 or 96hr  Date:  Impression:    CT:  Date:  Impression:    Esophagram:  Date: 10/27/20  IMPRESSION:  1. Normal esophageal motility.  2. Small to moderate hiatal hernia.    Date: as above in HPI  Impression:    Gastric emptying study   10/26/20  FINDINGS:   Percent retention at 60 min = 63%.  Percent retention at 120 min = 24%.  Percent retention at 180 min = 3%.  Percent retention at 240 min = Not obtained due to no  significant  retention at 3 hours.  IMPRESSION: Normal gastric emptying     8/28/19  Normal (70% retained at 1 hr and 9.9% retained at 4hrs)       Prior medical records were reviewed including, but not limited to, notes from referring providers, lab work, radiographic tests, and other diagnostic tests. Pertinent results were summarized above.     History     Past Medical History:   Diagnosis Date     Abdominal pain      ADHD (attention deficit hyperactivity disorder)      Anxiety      Depression      Gastroesophageal reflux disease      Gastroesophageal reflux disease with esophagitis      Intractable vomiting with nausea      Obesity     BMI 37.33 using vitals from 2/3/17     Uncomplicated asthma        Past Surgical History:   Procedure Laterality Date     ENDOSCOPY       ESOPHAGOSCOPY, GASTROSCOPY, DUODENOSCOPY (EGD), COMBINED N/A 2/9/2017    Procedure: COMBINED ESOPHAGOSCOPY, GASTROSCOPY, DUODENOSCOPY (EGD), BIOPSY SINGLE OR MULTIPLE;  Surgeon: David Valle MD;  Location: [facility]      ESOPHAGOSCOPY, GASTROSCOPY, DUODENOSCOPY (EGD), COMBINED N/A 7/21/2017    Procedure: COMBINED ESOPHAGOSCOPY, GASTROSCOPY, DUODENOSCOPY (EGD), BIOPSY SINGLE OR MULTIPLE;  Upper endoscopy with biopsy;  Surgeon: Yancy Chacko MD;  Location: [facility]      ESOPHAGOSCOPY, GASTROSCOPY, DUODENOSCOPY (EGD), COMBINED N/A 10/28/2020    Procedure: ESOPHAGOGASTRODUODENOSCOPY, WITH BIOPSY;  Surgeon: Juni Callahan DO;  Location: [facility] ESOPH/GAS REFLUX TEST W NASAL IMPED >1 HR N/A 4/3/2017    Procedure: ESOPHAGEAL IMPEDENCE FUNCTION TEST WITH 24 HOUR PH GREATER THAN 1 HOUR;  Surgeon: Yancy Chacko MD;  Location: [facility]      tonsillectomy and adenoidectomy         Social History     Socioeconomic History     Marital status: Single     Spouse name: Not on file     Number of children: Not on file     Years of education: Not on file     Highest education level: Not on file    Occupational History     Not on file   Social Needs     Financial resource strain: Not on file     Food insecurity     Worry: Not on file     Inability: Not on file     Transportation needs     Medical: Not on file     Non-medical: Not on file   Tobacco Use     Smoking status: Passive Smoke Exposure - Never Smoker     Smokeless tobacco: Never Used     Tobacco comment: mom smokes outside   Substance and Sexual Activity     Alcohol use: No     Alcohol/week: 0.0 standard drinks     Drug use: No     Sexual activity: Not on file   Lifestyle     Physical activity     Days per week: Not on file     Minutes per session: Not on file     Stress: Not on file   Relationships     Social connections     Talks on phone: Not on file     Gets together: Not on file     Attends Mu-ism service: Not on file     Active member of club or organization: Not on file     Attends meetings of clubs or organizations: Not on file     Relationship status: Not on file     Intimate partner violence     Fear of current or ex partner: Not on file     Emotionally abused: Not on file     Physically abused: Not on file     Forced sexual activity: Not on file   Other Topics Concern     Not on file   Social History Narrative     Not on file       Family History   Problem Relation Age of Onset     Peptic Ulcer Disease Mother      GERD Mother      Arthritis Mother      Pancreatitis No family hx of      Migraines No family hx of        Medications and Allergies:     Outpatient Encounter Medications as of 11/10/2020   Medication Sig Dispense Refill     calcium carbonate (TUMS) 500 MG chewable tablet        Ferrous Gluconate 324 (37.5 Fe) MG TABS        FLUoxetine (PROZAC) 20 MG capsule        ondansetron (ZOFRAN) 4 MG tablet Take by mouth every 8 hours as needed for nausea       RABEprazole (ACIPHEX) 20 MG EC tablet Take 20 mg twice daily, taken 30-60 minutes prior to meals 60 tablet 3     Facility-Administered Encounter Medications as of 11/10/2020    Medication Dose Route Frequency Provider Last Rate Last Dose     barium sulfate (EZ-DISK) tablet 700 mg  700 mg Oral Once Uli Urias MD         lidocaine (XYLOCAINE) 2 % topical gel    PRN Yancy Chacko MD   1 Tube at 04/03/17 0905     sod bicarbonate-citric acid-simethicone (EZ GAS) 2.21-1.53-0.04 g packet 4 g  4 g Oral Once Uli Urias MD            Allergies   Allergen Reactions     Kiwi Extract Shortness Of Breath and Anaphylaxis     Hives and tongue swells     Levsin [Hyoscyamine] Shortness Of Breath and Anaphylaxis     Baclofen      Vomiting     Omeprazole      Violent vomiting        Review of systems:  A full 10 point review of systems was obtained and was negative except for the pertinent positives and negatives stated within the HPI.    Objective Findings:   Physical Exam:    Constitutional: There were no vitals taken for this visit.  General: Alert, cooperative, no distress, well-appearing  Head: Atraumatic, normocephalic, no obvious abnormalities   Eyes: EOMI, Sclera anicteric, no obvious conjunctival hemorrhage   Nose: Nares normal, no obvious malformation, no obvious rhinorrhea   Respiratory: normal appearing respirations  Musculoskeletal: Range of motion intact, no obvious strength deficit  Skin: No jaundice, no obvious rash  Neurologic: AAOx3, no obvious neurologic abnormality  Psychiatric: Normal Affect, appropriate mood  Extremities: No obvious edema, no obvious malformation     Labs, Radiology, Pathology     Lab Results   Component Value Date    WBC 9.7 07/21/2017    WBC 6.9 03/28/2007    HGB 8.7 (L) 07/21/2017    HGB 7.7 (L) 04/14/2017    HGB 12.0 03/28/2007     07/21/2017     03/28/2007    ALT 27 07/21/2017    AST 15 07/21/2017        Liver Function Studies -   Recent Labs   Lab Test 07/21/17  1010   PROTTOTAL 6.8   ALBUMIN 3.2*   BILITOTAL 0.2   ALKPHOS 75   AST 15   ALT 27          Patient Active Problem List    Diagnosis Date Noted      Heartburn 11/10/2020     Priority: Medium     Postprandial vomiting 09/19/2020     Priority: Medium     Regurgitation of food 09/19/2020     Priority: Medium     Hiatal hernia 09/09/2019     Priority: Medium     Elevated transaminase level 02/05/2017     Priority: Medium     Erosive esophagitis 01/03/2017     Priority: Medium     Gastroesophageal reflux disease with esophagitis 01/03/2017     Priority: Medium     Obesity (BMI 30-39.9) 01/03/2017     Priority: Medium     Fatty liver 01/03/2017     Priority: Medium     H/O: suicide attempt 10/14/2015     Priority: Medium     Spent 2 weeks at [facility]. Hx of SIB leading up to it after she lost a friend from school in Nov 2014. Someone at school told her she should try to kill herself and so she attempted on 10/14/15.         Assessment and Plan   Assessment:    Minerva Finch is 20 year old female with history of erosive esophagitis, GERD, anxiety/depression with history of suicide attempt, class II obesity who is presenting as a follow up patient in consultation at the request of Dr. Sergo Gallegos with a chief complaint of erosive esophagitis, GERD..    The patient was seen as a video telemedicine follow up regarding her reflux. We reviewed her HRM, gastric emptying, barium esophagram, and EGD. Despite being on a potent PPI BID at the time of her endoscopy she had LA grade D esophagitis. This was in the setting of a hill grade IV hiatal hernia. On manometry she has significant ineffective esophageal motility. We had a very xiao conversation regarding the possibility of dysphagia and worse motility abnormalities following an antireflux procedure. That being said, potent PPI use has been ineffective in controlling her erosive disease. I think her IEM is likely a function of her reflux disease and some surgical fix will hopefully improve the manometric findings. She only has dysphagia when lying down. When eating and drinking upright she has zero dysphagia.  Fortunately her barium was normal.     If she were to undergo a surgical approach, a hiatal hernia repair with partial or loose wrap may be beneficial. The patient was aware of the risks.    In the meantime I have asked that she continue to take her PPI BID and add gaviscon for symptomatic relief. This will likely not change her erosive esophagitis. She may explore potassium channel acid blocker clinical trials although I was unable to find an active study recruiting in the [place].     She should also work on lifestyle modification as a way to decrease the amount of reflux she has.     Lastly, I would like her to get a gastrin level checked after holding her PPI for two weeks to determine if this markedly elevated accounting for her persistent acid reflux disease. If she continues her PPI this will cause an appropriate rise in gastrin however if fasting and it is significantly elevated it may indicate zollinger-mcmanus syndrome.     1. Gastroesophageal reflux disease with esophagitis without hemorrhage    2. Esophagitis, Tyler grade D    3. Obesity (BMI 30-39.9)    4. Regurgitation of food    5. Hiatal hernia    6. Heartburn       Orders Placed This Encounter   Procedures     Gastrin      Plan:  1. Please discuss with your surgeons regarding the possibility of surgery  2. In the meantime please continue the aciphex twice daily and add gaviscon for symptomatic relief  3. I would like you to obtain blood work called a gastrin level. For two weeks prior to the gastrin level please discontinue the aciphex so we can get an adequate gastrin level.  4. You may try to explore if there is an ongoing clinical trial on ClinicalTrials.gov regarding the use of potassium channel acid blockers (PCAB). On quick review during our video visit I was unable to find an active trial in the [place].  5. Please continue with lifestyle modifications (3-4hrs between eating and lying down, elevate the head of bed, healthy  weight loss)    Follow up plan:   Return to clinic 6 months and as needed.    The risks and benefits of my recommendations, as well as other treatment options were discussed with the patient and any available family today. All questions were answered.     o Follow up: As planned above. Today, I personally spent 35 minutes in direct face to face time with the patient, of which greater than 50% of the time was spent in patient education and counseling as described above. Approximately 10 minutes were spent on indirect care associated with the patient's consultation including but not limited to review of: patient medical records to date, clinic visits, hospital records, lab results, imaging studies, and procedural documentation. The findings from this review are summarized in the above note.    The patient verbalized understanding of the plan and was appreciative for the time spent and information provided during the office visit.     Author:   Juni Callahan DO   of Medicine  Division of Gastroenterology, Hepatology, and Nutrition  [facility]     Documentation assisted by voice recognition and documentation system.

## 2020-11-10 ENCOUNTER — VIRTUAL VISIT (OUTPATIENT)
Dept: GASTROENTEROLOGY | Facility: CLINIC | Age: 20
End: 2020-11-10
Payer: COMMERCIAL

## 2020-11-10 ENCOUNTER — TELEPHONE (OUTPATIENT)
Dept: ENDOCRINOLOGY | Facility: CLINIC | Age: 20
End: 2020-11-10

## 2020-11-10 DIAGNOSIS — E66.9 OBESITY (BMI 30-39.9): ICD-10-CM

## 2020-11-10 DIAGNOSIS — K21.00 GASTROESOPHAGEAL REFLUX DISEASE WITH ESOPHAGITIS WITHOUT HEMORRHAGE: Primary | ICD-10-CM

## 2020-11-10 DIAGNOSIS — K20.80 ESOPHAGITIS, LOS ANGELES GRADE D: ICD-10-CM

## 2020-11-10 DIAGNOSIS — R11.10 REGURGITATION OF FOOD: ICD-10-CM

## 2020-11-10 DIAGNOSIS — R12 HEARTBURN: ICD-10-CM

## 2020-11-10 DIAGNOSIS — K44.9 HIATAL HERNIA: ICD-10-CM

## 2020-11-10 PROCEDURE — 99215 OFFICE O/P EST HI 40 MIN: CPT | Mod: 95 | Performed by: INTERNAL MEDICINE

## 2020-11-10 NOTE — Clinical Note
Testing complete. Likely needs some surgical procedure. She will be reaching out. LA grade D on aciphex BID with a hill grade IV 5cm hiatal hernia. Normal GES. Normal barium w hernia seen. 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 Patient aware of possible dysphagia post surgical intervention. Going to check a gastrin level off PPI

## 2020-11-10 NOTE — LETTER
"  11/10/2020      RE: Minerva Finch  [address]  [place] [address]      Dear Colleague,    Thank you for referring your patient, Minerva Finch, to the [facility] [place]. Please see a copy of my visit note below.    Minerva Finch is a 20 year old female who is being evaluated via a billable video visit.      The patient has been notified of following:     \"This video visit will be conducted via a call between you and your physician/provider. We have found that certain health care needs can be provided without the need for an in-person physical exam.  This service lets us provide the care you need with a video conversation.  If a prescription is necessary we can send it directly to your pharmacy.  If lab work is needed we can place an order for that and you can then stop by our lab to have the test done at a later time.    Video visits are billed at different rates depending on your insurance coverage.  Please reach out to your insurance provider with any questions.    If during the course of the call the physician/provider feels a video visit is not appropriate, you will not be charged for this service.\"    Patient has given verbal consent for Video visit? Yes  How would you like to obtain your AVS? MyChart  If you are dropped from the video visit, the video invite should be resent to: Text to cell phone: [phone]  Will anyone else be joining your video visit? No    During this virtual visit the patient is located in [place], patient verifies this as the location during the entirety of this visit.         Video-Visit Details    Type of service:  Video Visit    Video Start Time: 07:20 AM  Video End Time:  07:55 AM    Originating Location (pt. Location): Other parent's house in [place]    Distant Location (provider location):  [facility]     Platform used for Video Visit: Ann-Marie Callahan, DO    Gastroenterology Visit for: Minerva THORNTON " Josette 2000   MRN: [id number]     Reason for Visit:  chief complaint    Referred by: No ref. provider found  /   Patient Care Team:  Pari Tenorio MD as PCP - General (Family Practice)  Stuart Soria (Pediatric Gastroenterology)  Yancy Chacko MD as MD (Pediatrics)  Flor Hicks, RN as Nurse Coordinator (Pediatric Gastroenterology)  Sergo Gallegos MD as Assigned Surgical Provider  Juni Callahan DO as MD (Gastroenterology)    History of Present Illness:   Minerva Finch is 20 year old female with history of erosive esophagitis, GERD, anxiety/depression with history of suicide attempt, class II obesity who is presenting as a follow up patient in consultation at the request of Dr. Sergo Gallegos with a chief complaint of erosive esophagitis, GERD..  ---------------------------------------------------------------  11/10/20  The patient states that she is taking aciphex twice a day. The patient states she was off of the aciphex for two days prior to the EGD. Patient has dysphagia if she is not sitting upright. When upright she has no dysphagia. No vomiting with aciphex. Not currently on gaviscon. Patient has intermittent nausea that is associated with regurgitation. It does not start as a true emesis however the regurgitation leads to vomiting.   ----------------------------------  9/18/20  Minerva Finch reports symptoms since infancy, managed with Zantac every day with good control for a period of time.     Since beginning of 2020, has had increased symptoms, with significant worsening in the summer months of 2020, which prompted visit with Dr. Sergo Gallegos in our Bariatric surgery department.  had increased food goes down, anything and everything comes up to into her mouth, almost immediately after swallowing, bland and/or acidic. As soon as doing eating, will try to force swallows to keep things down, but ultimately will usually need to vomit within 10-15 minutes eating. This  can contain sour material, yellow liquid, along with stomach contents sometimes tinged in the last 1.5 months. She estimates up to 1/4 cup of blood at times, including yesterday. This occurs with every meal or intake. She avoids eating at work due to this. She will try two meals per day. Sticks to bland foods.  This occurs mainly with food, but can occur with liquids sometimes.    Outside of these symptoms, has acid reflux, and/or sensation of gas bubble substernally, varies depending on the day, at least multiple times per day.    Nightly nocturnal symptoms awakes with severe burning, reflux of acid to the throat. Sometimes regurgitates small pieces of digested food material. Sleeps with 3 pillows minium, but also will try do sleeping in chair at 90 degrees. Has tried blocks to elevated HOB but hasn't helped.    Currently using Tums 2000 mg of Tums or more per day, effective but short-lived    At time of worsening symptoms. She has lost about 23 lb at time of worsening symptoms, now fluctuates between 195-205 lb. Symptoms have not improved with weight loss.  Muscle soreness in stomach after vomiting, otherwise no abdominal pain. Intermittent early satiety but some days has increased appetite after vomiting. She will try to snack in those circumstances.    Doesn't remember how she was doing prior to symptoms worsened. This is definitely the worst its ever been. Records are sparse between in [id number] because she reports her MD told her she cannot help her and needed to wait until she could see an adult gastroenterologist.    Typical bowel pattern is once per day soft form without straining, no blood or melena.    History of anxiety and depression, feels this is under good control right now, though understandably feels that her current GI issues do cause her some emotional distress. Feels safe with herself and at home.    No alcohol, tobacco, or marijuana use.    On oral iron reports had 2 units of pRBC this summer,  "reports ongoing low hemoglobin. Feels dizzy after throwing.  Intermittent position lightheadedness. No chest pain, SOB, palpitations.    Therapies tried:  -- Side effects to omeprazole and related medications - reports violent vomiting, can't get out of bed, blacks out due to feeling poorly. Last used in September 2019, previous to that was used in 2017.  -- Zantac ineffective  -- currently using Tums 2000 mg of Tums or more per day, effective but short-lived  -- Now trying cimetidine (Tagamet) 200 mg, started recently,once daily, for the last 2 days.  -- Not using Gaviscon, kind of helped   -- Carafate didn't help  -- Peppermint candy (sucks and spits out) helps temporarily     Summary of previous testing:  EGD 11/21/2016 - circumferential erosions involving distal 5 cm of esophagus, pathology showing moderate chronic inflammation (on PPI 20 mg daily at time of EGD). Started on Carafate following procedure and increaed to pantoprazole BID, Gaviscon.    EGD 2/9/2017- LA grade D esophagitis, with distal 1 cm of esophageal mucosa suspicious for Mojica's    PH impedence testing 4/3/2017- poor acid control (on Nexium 20 mg daily), prolonged acid exposure time, DeMeester score 100, with positive symptom association (nausea and abdominal pain)    EGD 7/2017: severe esophagitis with bleeding 25-32 cm from incisors.    esophagram showed reflux to thoracic outlet    EGD 9/9/2019:  \"from mid-esophagus down there were multiple linear ulcerations and erosions leading down to mild-sized hiatal hernia\" per report, was on Protonix BID (dose unclear) and Carafate  ---------------------------------------------------------------     Minerva Finch denies dysphagia, odynophagia, heartburn/reflux, nausea, vomiting, early satiety, abdominal pain, abdominal distension/bloating, diarrhea, constipation, hematochezia, or melena. No unintentional weight loss.     Wt Readings from Last 5 Encounters:   10/29/20 97.6 kg (215 lb 3.2 oz) "   10/28/20 90.7 kg (200 lb)   09/18/20 93 kg (205 lb)   08/06/20 94.3 kg (208 lb) (98 %, Z= 2.06)*   07/21/17 97.4 kg (214 lb 11.7 oz) (99 %, Z= 2.18)*     * Growth percentiles are based on [facility] (Girls, 2-20 Years) data.     Brief Esophageal Dysphagia Questionnaire (BEDQ):  We would like to ask about your dysphagia symptoms over the past 30 days, 6 months, and 12 months.  Please think about your symptom experiences and choose the best answer.      Over the past 14 days, on average, how often have you had the following?  If your response falls between two categories, please make your best guess.  If you are unable to eat the type of food in the question, please check  Cannot eat this.    Cannot  Eat This 0  Rarely/ Never 1  Once or twice a month 2  1-2 times per week 3  3-5 times per week 4  Daily or almost daily 5  Several times per day   Trouble eating solid food (meat, bread, vegetables)         x    Trouble eating soft foods (yogurt, jello, pudding)        x     Trouble swallowing liquids       x        Pain while swallowing  ---   x      Coughing or choking while swallowing foods or liquids --- x          SCORE: 9    Over the past 14 days, on average, how would you rate your discomfort or pain during swallowing? If you are unable to eat the type of food in the question, please check  Cannot eat this.    Cannot Eat This 0  None 1  Very Mild 2  Mild 3  Moderate 4  Moderately Severe    5  Severe   Eating solid food   (meat, bread, vegetables)    x      Eating soft foods   (yogurt, jello, pudding)    x      Drinking liquids  x          SCORE: 4    Approximately how many times in the past 12 months have you:   Had food stuck in your throat or esophagus for a period lasting longer than 30 minutes? no  Had to visit the emergency room because of food stuck in your throat or esophagus? no    Eckardt Score:   Score Dysphagia Regurgitation Retrosternal Pain Weight Loss (kg)   0 None None None None   1 Occasional Occasional  Occasional <5   2 Daily Daily Daily 5-10   3 Each meal Each meal Each meal >10     SCORE: 5    Any hardcopy questionnaire completed by the patient regarding SSQ, BEDQ, or Eckardt Score have been scanned into the media section of Epic.     STUDIES & PROCEDURES:    EGD:   Date: 10/28/20  Impression:  LA Grade D (one or more mucosal breaks involving at least 75% of        esophageal circumference) esophagitis with no bleeding was found 26 to        31 cm from the incisors. This was biopsied with a cold forceps for        histology. Verification of patient identification for the specimen was        done. Estimated blood loss: none.        The gastroesophageal flap valve was visualized endoscopically and        classified as Hill Grade IV (no fold, wide open lumen, hiatal hernia        present).        A 5 cm hiatal hernia was present. Diaphragmatic pinch at 36cm        The entire examined stomach was normal. Biopsies were taken with a cold        forceps for histology. Verification of patient identification for the        specimen was done. Estimated blood loss: none.        The duodenal bulb, first portion of the duodenum, second portion of the        duodenum and examined duodenum were normal.   Pathology Report:  A. STOMACH, BIOPSY:   - Antral type mucosa with features of mild reactive gastropathy   - Unremarkable gastric body-type mucosa   - No H. pylori like organisms identified on routine staining   - Negative for intestinal metaplasia and dysplasia     B. ESOPHAGEAL ULCER, BIOPSY:   - Acute esophagitis with focal ulceration   - No evidence of viral cytopathic effect on routine stain   - Negative for intestinal metaplasia and dysplasia     Colonoscopy:  Date:  Impression:    Pathology Report:     EndoFLIP directed at the UES or LES (8cm (EF-325) balloon length or 16cm (EF-322) balloon length):   Date:  8cm balloon  Balloon inflation Balloon pressure CSA (mm^2) DI (mm^2/mmHg) Dmin (mm) Compliance   20 (ladmark ID)         30        40        50           16cm balloon  Balloon inflation Balloon pressure CSA (mm^2) DI (mm^2/mmHg) Dmin (mm) Compliance   30 (ladmark ID)        40        50        60        70           High Resolution Manometry:  Date:10/29/20  Impressions   The baseline tone of the lower esophageal sphincter was hypotensive. The lower esophageal sphincter was able to relax appropriately as measured by the IRP. The EGJ morphology was type 2. There was peristalsis visible. The DCI, DL, and contractile pattern were not within normal limits. 3/10 swallows were failed, 6/10 swallows were weak, 1/10 was normal. Rapid water swallows were performed with less than normal augmentation. Reclining liquid swallows were performed. Upright liquid swallows were performed with no change in manometric pattern. Bolus transit as measured by impedance was complete in 0/10 swallows. This is most consistent with severe ineffective esophageal motility.     Based on the most recent Conway Classification, the findings are most consistent with severe ineffective esophageal motility. Low impedance and the appearance of incomplete bolus transit may be due to the significant erosive esophagitis rather than true incomplete bolus transit.     Dr Juni Callahan     PH/Impedance: ON therapy  Date: 2017  Impression:  - Gastric acid control: Poor, 80% evaluation pH<4  - Esophageal acid exposure: Prolonged with percent clearance time of 32.2% and longest episode of clearance 49.8 min  - Total number of reflux episodes 280 (270 upright and 10 recumbent)  - Symptom Association: The majority of reported symptoms are associated with reflux events.  Abdominal pain symptom index of 92% (12 occurences and 11 reflux related) and nausea symptom index of 75% (12 occurences 9 reflux related).     Bravo:  48 or 96hr  Date:  Impression:    CT:  Date:  Impression:    Esophagram:  Date: 10/27/20  IMPRESSION:  1. Normal esophageal motility.  2. Small to moderate  hiatal hernia.    Date: as above in HPI  Impression:    Gastric emptying study   10/26/20  FINDINGS:   Percent retention at 60 min = 63%.  Percent retention at 120 min = 24%.  Percent retention at 180 min = 3%.  Percent retention at 240 min = Not obtained due to no significant  retention at 3 hours.  IMPRESSION: Normal gastric emptying     8/28/19  Normal (70% retained at 1 hr and 9.9% retained at 4hrs)    Prior medical records were reviewed including, but not limited to, notes from referring providers, lab work, radiographic tests, and other diagnostic tests. Pertinent results were summarized above.     History     Past Medical History:   Diagnosis Date     Abdominal pain      ADHD (attention deficit hyperactivity disorder)      Anxiety      Depression      Gastroesophageal reflux disease      Gastroesophageal reflux disease with esophagitis      Intractable vomiting with nausea      Obesity     BMI 37.33 using vitals from 2/3/17     Uncomplicated asthma        Past Surgical History:   Procedure Laterality Date     ENDOSCOPY       ESOPHAGOSCOPY, GASTROSCOPY, DUODENOSCOPY (EGD), COMBINED N/A 2/9/2017    Procedure: COMBINED ESOPHAGOSCOPY, GASTROSCOPY, DUODENOSCOPY (EGD), BIOPSY SINGLE OR MULTIPLE;  Surgeon: David Valle MD;  Location:  [facility]      ESOPHAGOSCOPY, GASTROSCOPY, DUODENOSCOPY (EGD), COMBINED N/A 7/21/2017    Procedure: COMBINED ESOPHAGOSCOPY, GASTROSCOPY, DUODENOSCOPY (EGD), BIOPSY SINGLE OR MULTIPLE;  Upper endoscopy with biopsy;  Surgeon: Yancy Chacko MD;  Location: [facility] SEDATION      ESOPHAGOSCOPY, GASTROSCOPY, DUODENOSCOPY (EGD), COMBINED N/A 10/28/2020    Procedure: ESOPHAGOGASTRODUODENOSCOPY, WITH BIOPSY;  Surgeon: Juni Callahan DO;  Location: [facility] ESOPH/GAS REFLUX TEST W NASAL IMPED >1 HR N/A 4/3/2017    Procedure: ESOPHAGEAL IMPEDENCE FUNCTION TEST WITH 24 HOUR PH GREATER THAN 1 HOUR;  Surgeon: Yancy Chacko MD;  Location: [facility]  SEDATION      tonsillectomy and adenoidectomy         Social History     Socioeconomic History     Marital status: Single     Spouse name: Not on file     Number of children: Not on file     Years of education: Not on file     Highest education level: Not on file   Occupational History     Not on file   Social Needs     Financial resource strain: Not on file     Food insecurity     Worry: Not on file     Inability: Not on file     Transportation needs     Medical: Not on file     Non-medical: Not on file   Tobacco Use     Smoking status: Passive Smoke Exposure - Never Smoker     Smokeless tobacco: Never Used     Tobacco comment: mom smokes outside   Substance and Sexual Activity     Alcohol use: No     Alcohol/week: 0.0 standard drinks     Drug use: No     Sexual activity: Not on file   Lifestyle     Physical activity     Days per week: Not on file     Minutes per session: Not on file     Stress: Not on file   Relationships     Social connections     Talks on phone: Not on file     Gets together: Not on file     Attends Sabianist service: Not on file     Active member of club or organization: Not on file     Attends meetings of clubs or organizations: Not on file     Relationship status: Not on file     Intimate partner violence     Fear of current or ex partner: Not on file     Emotionally abused: Not on file     Physically abused: Not on file     Forced sexual activity: Not on file   Other Topics Concern     Not on file   Social History Narrative     Not on file       Family History   Problem Relation Age of Onset     Peptic Ulcer Disease Mother      GERD Mother      Arthritis Mother      Pancreatitis No family hx of      Migraines No family hx of        Medications and Allergies:     Outpatient Encounter Medications as of 11/10/2020   Medication Sig Dispense Refill     calcium carbonate (TUMS) 500 MG chewable tablet        Ferrous Gluconate 324 (37.5 Fe) MG TABS        FLUoxetine (PROZAC) 20 MG capsule         ondansetron (ZOFRAN) 4 MG tablet Take by mouth every 8 hours as needed for nausea       RABEprazole (ACIPHEX) 20 MG EC tablet Take 20 mg twice daily, taken 30-60 minutes prior to meals 60 tablet 3     Facility-Administered Encounter Medications as of 11/10/2020   Medication Dose Route Frequency Provider Last Rate Last Dose     barium sulfate (EZ-DISK) tablet 700 mg  700 mg Oral Once Uli Urias MD         lidocaine (XYLOCAINE) 2 % topical gel    PRN Yancy Chacko MD   1 Tube at 04/03/17 0905     sod bicarbonate-citric acid-simethicone (EZ GAS) 2.21-1.53-0.04 g packet 4 g  4 g Oral Once Uli Urias MD            Allergies   Allergen Reactions     Kiwi Extract Shortness Of Breath and Anaphylaxis     Hives and tongue swells     Levsin [Hyoscyamine] Shortness Of Breath and Anaphylaxis     Baclofen      Vomiting     Omeprazole      Violent vomiting        Review of systems:  A full 10 point review of systems was obtained and was negative except for the pertinent positives and negatives stated within the HPI.    Objective Findings:   Physical Exam:    Constitutional: There were no vitals taken for this visit.  General: Alert, cooperative, no distress, well-appearing  Head: Atraumatic, normocephalic, no obvious abnormalities   Eyes: EOMI, Sclera anicteric, no obvious conjunctival hemorrhage   Nose: Nares normal, no obvious malformation, no obvious rhinorrhea   Respiratory: normal appearing respirations  Musculoskeletal: Range of motion intact, no obvious strength deficit  Skin: No jaundice, no obvious rash  Neurologic: AAOx3, no obvious neurologic abnormality  Psychiatric: Normal Affect, appropriate mood  Extremities: No obvious edema, no obvious malformation     Labs, Radiology, Pathology     Lab Results   Component Value Date    WBC 9.7 07/21/2017    WBC 6.9 03/28/2007    HGB 8.7 (L) 07/21/2017    HGB 7.7 (L) 04/14/2017    HGB 12.0 03/28/2007     07/21/2017      03/28/2007    ALT 27 07/21/2017    AST 15 07/21/2017        Liver Function Studies -   Recent Labs   Lab Test 07/21/17  1010   PROTTOTAL 6.8   ALBUMIN 3.2*   BILITOTAL 0.2   ALKPHOS 75   AST 15   ALT 27        Patient Active Problem List    Diagnosis Date Noted     Heartburn 11/10/2020     Priority: Medium     Postprandial vomiting 09/19/2020     Priority: Medium     Regurgitation of food 09/19/2020     Priority: Medium     Hiatal hernia 09/09/2019     Priority: Medium     Elevated transaminase level 02/05/2017     Priority: Medium     Erosive esophagitis 01/03/2017     Priority: Medium     Gastroesophageal reflux disease with esophagitis 01/03/2017     Priority: Medium     Obesity (BMI 30-39.9) 01/03/2017     Priority: Medium     Fatty liver 01/03/2017     Priority: Medium     H/O: suicide attempt 10/14/2015     Priority: Medium     Spent 2 weeks at [facility]. Hx of SIB leading up to it after she lost a friend from school in Nov 2014. Someone at school told her she should try to kill herself and so she attempted on 10/14/15.         Assessment and Plan   Assessment:    Minerva Finch is 20 year old female with history of erosive esophagitis, GERD, anxiety/depression with history of suicide attempt, class II obesity who is presenting as a follow up patient in consultation at the request of Dr. Sergo Gallegos with a chief complaint of erosive esophagitis, GERD..    The patient was seen as a video telemedicine follow up regarding her reflux. We reviewed her HRM, gastric emptying, barium esophagram, and EGD. Despite being on a potent PPI BID at the time of her endoscopy she had LA grade D esophagitis. This was in the setting of a hill grade IV hiatal hernia. On manometry she has significant ineffective esophageal motility. We had a very xiao conversation regarding the possibility of dysphagia and worse motility abnormalities following an antireflux procedure. That being said, potent PPI use has been ineffective in  controlling her erosive disease. I think her IEM is likely a function of her reflux disease and some surgical fix will hopefully improve the manometric findings. She only has dysphagia when lying down. When eating and drinking upright she has zero dysphagia. Fortunately her barium was normal.     If she were to undergo a surgical approach, a hiatal hernia repair with partial or loose wrap may be beneficial. The patient was aware of the risks.    In the meantime I have asked that she continue to take her PPI BID and add gaviscon for symptomatic relief. This will likely not change her erosive esophagitis. She may explore potassium channel acid blocker clinical trials although I was unable to find an active study recruiting in the [place].     She should also work on lifestyle modification as a way to decrease the amount of reflux she has.     Lastly, I would like her to get a gastrin level checked after holding her PPI for two weeks to determine if this markedly elevated accounting for her persistent acid reflux disease. If she continues her PPI this will cause an appropriate rise in gastrin however if fasting and it is significantly elevated it may indicate zollinger-mcmanus syndrome.     1. Gastroesophageal reflux disease with esophagitis without hemorrhage    2. Esophagitis, [place] grade D    3. Obesity (BMI 30-39.9)    4. Regurgitation of food    5. Hiatal hernia    6. Heartburn       Orders Placed This Encounter   Procedures     Gastrin      Plan:  1. Please discuss with your surgeons regarding the possibility of surgery  2. In the meantime please continue the aciphex twice daily and add gaviscon for symptomatic relief  3. I would like you to obtain blood work called a gastrin level. For two weeks prior to the gastrin level please discontinue the aciphex so we can get an adequate gastrin level.  4. You may try to explore if there is an ongoing clinical trial on ClinicalTrials.gov regarding the use of  potassium channel acid blockers (PCAB). On quick review during our video visit I was unable to find an active trial in the [place].  5. Please continue with lifestyle modifications (3-4hrs between eating and lying down, elevate the head of bed, healthy weight loss)    Follow up plan:   Return to clinic 6 months and as needed.    The risks and benefits of my recommendations, as well as other treatment options were discussed with the patient and any available family today. All questions were answered.     o Follow up: As planned above. Today, I personally spent 35 minutes in direct face to face time with the patient, of which greater than 50% of the time was spent in patient education and counseling as described above. Approximately 10 minutes were spent on indirect care associated with the patient's consultation including but not limited to review of: patient medical records to date, clinic visits, hospital records, lab results, imaging studies, and procedural documentation. The findings from this review are summarized in the above note.    The patient verbalized understanding of the plan and was appreciative for the time spent and information provided during the office visit.     Author:   Juni Callahan DO   of Medicine  Division of Gastroenterology, Hepatology, and Nutrition  [facility]     Documentation assisted by voice recognition and documentation system.

## 2020-11-10 NOTE — PATIENT INSTRUCTIONS
It was a pleasure taking care of you today.  I've included a brief summary of our discussion and care plan from today's visit below.  Please review this information with your primary care provider.  _______________________________________________________________________    My recommendations are summarized as follows:    1. Please discuss with your surgeons regarding the possibility of surgery  2. In the meantime please continue the aciphex twice daily and add gaviscon for symptomatic relief  3. I would like you to obtain blood work called a gastrin level. For two weeks prior to the gastrin level please discontinue the aciphex so we can get an adequate gastrin level.  4. You may try to explore if there is an ongoing clinical trial on ClinicalTrials.gov regarding the use of potassium channel acid blockers (PCAB). On quick review during our video visit I was unable to find an active trial in the [place].  5. Please continue with lifestyle modifications (3-4hrs between eating and lying down, elevate the head of bed, healthy weight loss)      To schedule endoscopic procedures you may call: [phone]  To schedule radiology tests you may call: [phone]  To schedule an ENT appointment you may call: [phone]    Please call our nurse Martita with any questions or concerns- [phone].  --    Return to GI Clinic in 6 months to review your progress.    _______________________________________________________________________    Who do I call with any questions after my visit?  Please be in touch if there are any further questions that arise following today's visit.  There are multiple ways to contact your gastroenterology care team.        During business hours, you may reach a Gastroenterology nurse at [phone] and choose option 3.         To schedule or reschedule an appointment, please call [phone].       You can always send a secure message through [employer].  [employer] messages are answered by your  nurse or doctor typically within 24 hours.  Please allow extra time on weekends and holidays.        For urgent/emergent questions after business hours, you may reach the on-call GI Fellow by contacting the [facility]  at [phone].     How will I get the results of any tests ordered?    You will receive all of your results.  If you have signed up for [employer], any tests ordered at your visit will be available to you after your physician reviews them.  Typically this takes 1-2 weeks.  If there are urgent results that require a change in your care plan, your physician or nurse will call you to discuss the next steps.      What is [employer]?  [employer] is a secure way for you to access all of your healthcare records from the [facility].  It is a web based computer program, so you can sign on to it from any location.  It also allows you to send secure messages to your care team.  I recommend signing up for [employer] access if you have not already done so and are comfortable with using a computer.      How to I schedule a follow-up visit?  If you did not schedule a follow-up visit today, please call [phone] to schedule a follow-up office visit.        Sincerely,    Juni Callahan DO   of Medicine  Division of Gastroenterology, Hepatology, and Nutrition  [facility]

## 2020-11-10 NOTE — TELEPHONE ENCOUNTER
Patient calling to notify Dr. Gallegos that she is done with her testing and had a final visit with Dr. Callahan who is recommending surgery. Notified patient that information would be relayed to Dr. Gallegos to review Dr. Callahan's notes and test results.  Will get back with patient regarding further information.

## 2020-11-13 ENCOUNTER — PATIENT OUTREACH (OUTPATIENT)
Dept: ENDOCRINOLOGY | Facility: CLINIC | Age: 20
End: 2020-11-13

## 2020-11-13 ENCOUNTER — MYC MEDICAL ADVICE (OUTPATIENT)
Dept: GASTROENTEROLOGY | Facility: CLINIC | Age: 20
End: 2020-11-13

## 2020-11-13 NOTE — PROGRESS NOTES
Phone call to patient regarding surgery follow-up.  Discussed Dr. Callahan's plan with patient.  Discussed that surgery would not occur sooner than January.  Encouraged patient to have gastrin level done before scheduling surgery as the results may change the plan of treatment.  Patient sounded disappointed but is agreeable.  Will follow-up with patient next week if there is any change.

## 2020-11-16 ENCOUNTER — DOCUMENTATION ONLY (OUTPATIENT)
Dept: GASTROENTEROLOGY | Facility: CLINIC | Age: 20
End: 2020-11-16

## 2020-11-16 NOTE — PROGRESS NOTES
Per request (GAVIN Anders)    Order for gastrin lab faxed to [facility] in [place], [place] at [phone].    Koby Martinez LPN       Responded to patient's request for visit on [address] [address]. Met with Harpal Hines in [facility], providing pastoral presence and supportive listening as she spoke about her father's death and the feelings and emotions she has experienced. She also expressed interest in learning more about different faiths. She shared her  is Buddhist and she has a desire that her child be raised in [place]. Encouraged her to continue her seeking and offered assurance of prayer. SEYMOUR Roberts, [address] [facility],  [facility]  Paging Service  [phone])

## 2020-11-22 ENCOUNTER — HEALTH MAINTENANCE LETTER (OUTPATIENT)
Age: 20
End: 2020-11-22

## 2020-12-03 ENCOUNTER — DOCUMENTATION ONLY (OUTPATIENT)
Dept: GASTROENTEROLOGY | Facility: CLINIC | Age: 20
End: 2020-12-03

## 2020-12-03 NOTE — PROGRESS NOTES
Per Pt's request, order for gastrin lab faxed again to [facility] in [place], [place] at [phone].     Koby Martinez LPN

## 2020-12-07 ENCOUNTER — TRANSFERRED RECORDS (OUTPATIENT)
Dept: HEALTH INFORMATION MANAGEMENT | Facility: CLINIC | Age: 20
End: 2020-12-07

## 2021-01-06 ENCOUNTER — TELEPHONE (OUTPATIENT)
Dept: ENDOCRINOLOGY | Facility: CLINIC | Age: 21
End: 2021-01-06

## 2021-01-06 NOTE — TELEPHONE ENCOUNTER
Patient called stating GAVIN Baron asked her to call me to schedule appointments. I discussed that I was working on a letter to be sent to [facility] for the purpose of authorization for the Rashida-en-Y gastric bypass due to GERD and not obesity. Patient has struggled with this since childhood.   Discussed I did not have an OR date at this time but will check to see if I can get OR time on a Friday. Plan would be for patient to come in on the prior Monday and get COVID-19 test done, Wednesday prior would see PAC and surgery would be Friday.   Patient asked if I would keep her in the loop and I said I definitely would. She is working with her school on delaying classes until she gets a surgery date.     Patient called back asking if I could check into housing for her, had been at [facility] as a child and wondered if that was a possibility. States she cannot afford hotel costs. I told her I would check with our  to see if she had suggestions and get back to patient.

## 2021-01-21 ENCOUNTER — VIRTUAL VISIT (OUTPATIENT)
Dept: SURGERY | Facility: CLINIC | Age: 21
End: 2021-01-21
Payer: COMMERCIAL

## 2021-01-21 ENCOUNTER — VIRTUAL VISIT (OUTPATIENT)
Dept: ENDOCRINOLOGY | Facility: CLINIC | Age: 21
End: 2021-01-21
Payer: COMMERCIAL

## 2021-01-21 VITALS — HEIGHT: 63 IN | BODY MASS INDEX: 35.44 KG/M2 | WEIGHT: 200 LBS

## 2021-01-21 DIAGNOSIS — K22.10 EROSIVE ESOPHAGITIS: Primary | ICD-10-CM

## 2021-01-21 DIAGNOSIS — Z71.3 NUTRITIONAL COUNSELING: ICD-10-CM

## 2021-01-21 DIAGNOSIS — R12 HEARTBURN: ICD-10-CM

## 2021-01-21 DIAGNOSIS — E66.9 OBESITY (BMI 30-39.9): Primary | ICD-10-CM

## 2021-01-21 PROCEDURE — 99213 OFFICE O/P EST LOW 20 MIN: CPT | Mod: 95 | Performed by: SURGERY

## 2021-01-21 PROCEDURE — 97802 MEDICAL NUTRITION INDIV IN: CPT | Mod: 95 | Performed by: DIETITIAN, REGISTERED

## 2021-01-21 ASSESSMENT — MIFFLIN-ST. JEOR: SCORE: 1646.32

## 2021-01-21 ASSESSMENT — PAIN SCALES - GENERAL: PAINLEVEL: NO PAIN (0)

## 2021-01-21 NOTE — LETTER
"1/21/2021       RE: Minerva Finch  [address]  [place] [place] [facility] [address]     Dear Colleague,    Thank you for referring your patient, Minerva Finch, to the [facility] [place] [facility]. Please see a copy of my visit note below.    Minerva Finch is a 20 year old who is being evaluated via a billable video visit.      The patient has been notified of following:     \"This video visit will be conducted via a call between you and your physician/provider. We have found that certain health care needs can be provided without the need for an in-person physical exam.  This service lets us provide the care you need with a video conversation.  If a prescription is necessary we can send it directly to your pharmacy.  If lab work is needed we can place an order for that and you can then stop by our lab to have the test done at a later time.    Video visits are billed at different rates depending on your insurance coverage.  Please reach out to your insurance provider with any questions.    If during the course of the call the physician/provider feels a video visit is not appropriate, you will not be charged for this service.\"    Patient has given verbal consent for Video visit? Yes  How would you like to obtain your AVS? MyChart  Will anyone else be joining your video visit? No        Video-Visit Details    Type of service:  Video Visit    Video Start Time: 10:13 AM   Video End Time: 10:48 AM    Originating Location (pt. Location): Home    Distant Location (provider location):  [facility] [place]     Platform used for Video Visit: [employer]    New Bariatric Nutrition Consultation Note    Reason For Visit: Nutrition Assessment    Minerva Finch is a 20 year old presenting today for new bariatric nutrition consult.   Pt is interested in RNYGB with Dr. Gallegos to help decrease long standing reflux that has been " "unresponsive with medication .  Patient is accompanied by self.  This is pt's first of 6 required nutrition visits prior to surgery.     Pt referred by Dr. Gallegos on January 21, 2021.  Patient with Co-morbidities of obesity including:  Type II DM no  Renal Failure no  Sleep apnea no  Hypertension no   Dyslipidemia no  Joint pain no  Back pain no  GERD yes     No flowsheet data found.    ANTHROPOMETRICS:  Estimated body mass index is 35.43 kg/m  as calculated from the following:    Height as of an earlier encounter on 1/21/21: 1.6 m (5' 3\").    Weight as of an earlier encounter on 1/21/21: 90.7 kg (200 lb).      SUPPLEMENT INFORMATION:  None at this time     NUTRITION HISTORY:  Pt reports she has been dealing with reflux since she was little, in the past  1 1/2 years she reports the reflux has increase and now barley will keep food down. She will drink a protein shake (Equate) if she does not feel like eating, often this will sit better.   - She has changed her diet in hope of decreasing reflux. She does not eat spicy foods, tomato, carbonated beverages and no greasy foods.   - She reports she will eat her meal at a medium place.     Beverages: water or milk (2%)  B: Bowl of cereal  Snack: chips  L: snadwich and some chips   D: Chicken,      ADDITIONAL INFORMATION:  Works two part time jobs     NUTRITION DIAGNOSIS:  Obesity r/t long history of self-monitoring deficit and excessive energy intake aeb BMI >30 kg/m2.  And   Altered GI function r/t     INTERVENTION:  Intervention Provided/Education Provided on post-op diet guidelines, vitamins/minerals essential post-operatively, GI anatomy of bariatric surgeries, ways to help prepare for post-op diet guidelines pre-operatively, portion/calorie-control, mindful eating and sources of protein.  Patient demonstrates understanding. Provided pt with list of goals RD contact information.      Expected Engagement: good    GOALS:  Relating To Eating:  Eat slowly (20-30 minutes " per meal), chewing foods well (25 chews per bite/applesauce consistency)  Focus on lean protein and non-starchy vegetables/whole fruit at each meal.     Relating to beverages:  Reduce caffeine/carbonation/calorie containing beverages  Separate fluids from meals by 30 minutes before, during, and after eating    Weight Loss Surgery Post-Op Diet Progression    Diet Guidelines after Weight-loss Surgery  [email or web address]     Portion Sizes after Weight Loss Surgery  [email or web address]      Time spent with patient: 35 minutes.  Brittany Nieto, MS, RD, LD

## 2021-01-21 NOTE — PROGRESS NOTES
"Minerva is a 20 year old who is being evaluated via a billable video visit.      How would you like to obtain your AVS? MyChart  If the video visit is dropped, the invitation should be resent by: Text to cell phone: [phone]  Will anyone else be joining your video visit? No    Video Start Time: 0810  Video-Visit Details    Type of service:  Video Visit    Video End Time:0825    Originating Location (pt. Location): Home    Distant Location (provider location):  [facility]     Platform used for Video Visit: [employer]           Patient was previously seen in clinic last summer for evaluation of severe esophagitis in the setting of obesity, class II.    Previously, I performed the following operation on her: none    Current chief complaint: persistent reflux/severe epigastric abdominal pain.    Prior endoscopies have documented the presence of severe esophagitis.  There have been mixed opinions about proceeding with gastric bypass vs. Nissen vs. Sleeve gastrectomy.    Consensus is that Nissen will not work well due to obesity condition and the severe esophagitis, which might not be helped by that operation.    I discussed the option of gastric bypass with Minerva and can consider requesting prior authorization from her insurance company for performance of this procedure to treat reflux; there is still pending input from GI team and I will await finalizing this as well.    Minerva iFnch overall has had the following complaints since the last visit: persistent reflux; has plans to start schooling over the next year which will require dedicated time from Minerva and this would be complicated by having to undergo surgery.        On exam:  Ht 1.6 m (5' 3\")   Wt 90.7 kg (200 lb)   BMI 35.43 kg/m    Gen: alert, NAD  Lung exam: breathing unlabored  Rest of exam deferred due to virtual visit.    LABS:   Last complete blood count:  Lab Results   Component Value Date    WBC 9.7 07/21/2017 "     Lab Results   Component Value Date    RBC 4.29 07/21/2017     Lab Results   Component Value Date    HGB 8.7 07/21/2017     Lab Results   Component Value Date    HCT 30.0 07/21/2017     Lab Results   Component Value Date    MCV 70 07/21/2017     Lab Results   Component Value Date    MCH 20.3 07/21/2017     Lab Results   Component Value Date    MCHC 29.0 07/21/2017     Lab Results   Component Value Date    RDW 18.2 07/21/2017     Lab Results   Component Value Date     07/21/2017       Lab Results   Component Value Date    AST 15 07/21/2017     Lab Results   Component Value Date    ALT 27 07/21/2017     Lab Results   Component Value Date    ALBUMIN 3.2 07/21/2017     Lab Results   Component Value Date    PROTTOTAL 6.8 07/21/2017       Last Basic Metabolic Panel:  No results found for: NA       IMAGING:   None relevant.    Upper endoscopy:  Reviewed and reviewed esophageal studies and recommendations.    Plan:  Consider prior authorization for gastric bypass; however, await final input from esophageal conference which hasn't happened again since our first discussion last summer.    No orders of the defined types were placed in this encounter.          Sergo Gallegos MD  Surgery  [phone] (hospital )  [phone] (clinic nurses)

## 2021-01-21 NOTE — LETTER
1/21/2021       RE: Minerva Finch  [address] [place] [address]  [place] [address]     Dear Colleague,    Thank you for referring your patient, Minerva Finch, to the [facility]. Please see a copy of my visit note below.    Minerva is a 20 year old who is being evaluated via a billable video visit.      How would you like to obtain your AVS? MyChart  If the video visit is dropped, the invitation should be resent by: Text to cell phone: [phone]  Will anyone else be joining your video visit? No    Video Start Time: 0810  Video-Visit Details    Type of service:  Video Visit    Video End Time:0825    Originating Location (pt. Location): Home    Distant Location (provider location):  [facility]     Platform used for Video Visit: [employer]           Patient was previously seen in clinic last summer for evaluation of severe esophagitis in the setting of obesity, class II.    Previously, I performed the following operation on her: none    Current chief complaint: persistent reflux/severe epigastric abdominal pain.    Prior endoscopies have documented the presence of severe esophagitis.  There have been mixed opinions about proceeding with gastric bypass vs. Nissen vs. Sleeve gastrectomy.    Consensus is that Nissen will not work well due to obesity condition and the severe esophagitis, which might not be helped by that operation.    I discussed the option of gastric bypass with Minerva and can consider requesting prior authorization from her insurance company for performance of this procedure to treat reflux; there is still pending input from GI team and I will await finalizing this as well.    Minerva Finch overall has had the following complaints since the last visit: persistent reflux; has plans to start schooling over the next year which will require dedicated time from  "Minerva and this would be complicated by having to undergo surgery.        On exam:  Ht 1.6 m (5' 3\")   Wt 90.7 kg (200 lb)   BMI 35.43 kg/m    Gen: alert, NAD  Lung exam: breathing unlabored  Rest of exam deferred due to virtual visit.    LABS:   Last complete blood count:  Lab Results   Component Value Date    WBC 9.7 07/21/2017     Lab Results   Component Value Date    RBC 4.29 07/21/2017     Lab Results   Component Value Date    HGB 8.7 07/21/2017     Lab Results   Component Value Date    HCT 30.0 07/21/2017     Lab Results   Component Value Date    MCV 70 07/21/2017     Lab Results   Component Value Date    MCH 20.3 07/21/2017     Lab Results   Component Value Date    MCHC 29.0 07/21/2017     Lab Results   Component Value Date    RDW 18.2 07/21/2017     Lab Results   Component Value Date     07/21/2017       Lab Results   Component Value Date    AST 15 07/21/2017     Lab Results   Component Value Date    ALT 27 07/21/2017     Lab Results   Component Value Date    ALBUMIN 3.2 07/21/2017     Lab Results   Component Value Date    PROTTOTAL 6.8 07/21/2017       Last Basic Metabolic Panel:  No results found for: NA       IMAGING:   None relevant.    Upper endoscopy:  Reviewed and reviewed esophageal studies and recommendations.    Plan:  Consider prior authorization for gastric bypass; however, await final input from esophageal conference which hasn't happened again since our first discussion last summer.    No orders of the defined types were placed in this encounter.    Sergo Gallegos MD  Surgery  [phone] (hospital )  [phone] (clinic nurses)    "

## 2021-01-22 NOTE — PATIENT INSTRUCTIONS
GOALS:  Relating To Eating:  Eat slowly (20-30 minutes per meal), chewing foods well (25 chews per bite/applesauce consistency)  Focus on lean protein and non-starchy vegetables/whole fruit at each meal.     Relating to beverages:  Reduce caffeine/carbonation/calorie containing beverages  Separate fluids from meals by 30 minutes before, during, and after eating    Weight Loss Surgery Post-Op Diet Progression    Diet Guidelines after Weight-loss Surgery  [email or web address]     Portion Sizes after Weight Loss Surgery  [email or web address]

## 2021-01-22 NOTE — PROGRESS NOTES
"Minerva Finch is a 20 year old who is being evaluated via a billable video visit.      The patient has been notified of following:     \"This video visit will be conducted via a call between you and your physician/provider. We have found that certain health care needs can be provided without the need for an in-person physical exam.  This service lets us provide the care you need with a video conversation.  If a prescription is necessary we can send it directly to your pharmacy.  If lab work is needed we can place an order for that and you can then stop by our lab to have the test done at a later time.    Video visits are billed at different rates depending on your insurance coverage.  Please reach out to your insurance provider with any questions.    If during the course of the call the physician/provider feels a video visit is not appropriate, you will not be charged for this service.\"    Patient has given verbal consent for Video visit? Yes  How would you like to obtain your AVS? MyChart  Will anyone else be joining your video visit? No        Video-Visit Details    Type of service:  Video Visit    Video Start Time: 10:13 AM   Video End Time: 10:48 AM    Originating Location (pt. Location): Home    Distant Location (provider location):  [facility] [place]     Platform used for Video Visit: [employer]    New Bariatric Nutrition Consultation Note    Reason For Visit: Nutrition Assessment    Minerva Finch is a 20 year old presenting today for new bariatric nutrition consult.   Pt is interested in RNYGB with Dr. Gallegos to help decrease long standing reflux that has been unresponsive with medication .  Patient is accompanied by self.  This is pt's first of 6 required nutrition visits prior to surgery.     Pt referred by Dr. Gallegos on January 21, 2021.  Patient with Co-morbidities of obesity including:  Type II DM no  Renal Failure no  Sleep apnea no  Hypertension no   Dyslipidemia " "no  Joint pain no  Back pain no  GERD yes     No flowsheet data found.    ANTHROPOMETRICS:  Estimated body mass index is 35.43 kg/m  as calculated from the following:    Height as of an earlier encounter on 1/21/21: 1.6 m (5' 3\").    Weight as of an earlier encounter on 1/21/21: 90.7 kg (200 lb).      SUPPLEMENT INFORMATION:  None at this time     NUTRITION HISTORY:  Pt reports she has been dealing with reflux since she was little, in the past  1 1/2 years she reports the reflux has increase and now barley will keep food down. She will drink a protein shake (Equate) if she does not feel like eating, often this will sit better.   - She has changed her diet in hope of decreasing reflux. She does not eat spicy foods, tomato, carbonated beverages and no greasy foods.   - She reports she will eat her meal at a medium place.     Beverages: water or milk (2%)  B: Bowl of cereal  Snack: chips  L: snadwich and some chips   D: Chicken,      ADDITIONAL INFORMATION:  Works two part time jobs     NUTRITION DIAGNOSIS:  Obesity r/t long history of self-monitoring deficit and excessive energy intake aeb BMI >30 kg/m2.  And   Altered GI function r/t     INTERVENTION:  Intervention Provided/Education Provided on post-op diet guidelines, vitamins/minerals essential post-operatively, GI anatomy of bariatric surgeries, ways to help prepare for post-op diet guidelines pre-operatively, portion/calorie-control, mindful eating and sources of protein.  Patient demonstrates understanding. Provided pt with list of goals RD contact information.      Expected Engagement: good    GOALS:  Relating To Eating:  Eat slowly (20-30 minutes per meal), chewing foods well (25 chews per bite/applesauce consistency)  Focus on lean protein and non-starchy vegetables/whole fruit at each meal.     Relating to beverages:  Reduce caffeine/carbonation/calorie containing beverages  Separate fluids from meals by 30 minutes before, during, and after eating    Weight " Loss Surgery Post-Op Diet Progression    Diet Guidelines after Weight-loss Surgery  [email or web address]     Portion Sizes after Weight Loss Surgery  [email or web address]      Time spent with patient: 35 minutes.  Brittany Nieto, MS, RD, LD

## 2021-02-02 ENCOUNTER — CARE COORDINATION (OUTPATIENT)
Dept: GASTROENTEROLOGY | Facility: CLINIC | Age: 21
End: 2021-02-02

## 2021-02-02 ENCOUNTER — PATIENT OUTREACH (OUTPATIENT)
Dept: GASTROENTEROLOGY | Facility: CLINIC | Age: 21
End: 2021-02-02

## 2021-02-02 NOTE — PROGRESS NOTES
Confirmed with pt that they followed instructions regarding gastrin level order.  'For two weeks prior to the gastrin level please discontinue the aciphex so we can get an adequate gastrin level.'  Pt was off of aciphex for two weeks prior to having lab drawn. Providers notified of confirmation.

## 2021-02-02 NOTE — PROGRESS NOTES
We discussed Minerva Finch's case at multidisciplinary GI conference today.     She has failed medical therapy of high dose PPI.     Regarding surgical consideration, she has ineffective esophageal motility and class 2 obesity (BMI 35.4), both of which can complicate fundoplication. Given this, a diverting procedure would be preferred.     Before an operation she should be seen by mental health and nutrition to exclude an eating disorder such as bulemia as the cause of her pathogenic acid reflux. She should have a serum gastrin level tested while off PPI therapy as outlined in most recent GI note. Lastly, there could be consideration for repeat pH/impedance testing to exclude rumination syndrome.     Yves Munguia MD    [facility]  Division of Gastroenterology  [phone]

## 2021-02-10 ENCOUNTER — VIRTUAL VISIT (OUTPATIENT)
Dept: ENDOCRINOLOGY | Facility: CLINIC | Age: 21
End: 2021-02-10
Payer: COMMERCIAL

## 2021-02-10 DIAGNOSIS — E66.9 OBESITY (BMI 30-39.9): Primary | ICD-10-CM

## 2021-02-10 DIAGNOSIS — Z71.3 NUTRITIONAL COUNSELING: ICD-10-CM

## 2021-02-10 PROCEDURE — 97803 MED NUTRITION INDIV SUBSEQ: CPT | Mod: 95 | Performed by: DIETITIAN, REGISTERED

## 2021-02-10 NOTE — PATIENT INSTRUCTIONS
GOALS:  Relating To Eating:  Eat slowly (20-30 minutes per meal), chewing foods well (25 chews per bite/applesauce consistency)  Focus on lean protein and non-starchy vegetables/whole fruit at each meal.  - Try recreating some of the meals that do not cause your reflux as bad.   - Try frozen vegetables   - Try having a vegetable at two meals daily.      Relating to beverages:  Reduce caffeine/carbonation/calorie containing beverages  Separate fluids from meals by 30 minutes before, during, and after eating    Diet Guidelines after Weight-loss Surgery  [email or web address]     Portion Sizes after Weight Loss Surgery  [email or web address]    My Plate Planner_English - Pt Education  [email or web address]    Protein Sources for Weight Loss  [email or web address]     Carbohydrates  [email or web address]     Follow up with RD in one month    Brittany Nieto, MS, RD, LD  If you need to schedule or reschedule with a dietitian please call [phone].

## 2021-02-10 NOTE — LETTER
"2/10/2021       RE: Minerva Finch  [address]  [place] [address]     Dear Colleague,    Thank you for referring your patient, Minerva Finch, to the [facility] [place] at [facility]. Please see a copy of my visit note below.    Minerva Finch is a 20 year old who is being evaluated via a billable video visit.      The patient has been notified of following:     \"This video visit will be conducted via a call between you and your physician/provider. We have found that certain health care needs can be provided without the need for an in-person physical exam.  This service lets us provide the care you need with a video conversation.  If a prescription is necessary we can send it directly to your pharmacy.  If lab work is needed we can place an order for that and you can then stop by our lab to have the test done at a later time.    Video visits are billed at different rates depending on your insurance coverage.  Please reach out to your insurance provider with any questions.    If during the course of the call the physician/provider feels a video visit is not appropriate, you will not be charged for this service.\"    Patient has given verbal consent for Video visit? Yes  How would you like to obtain your AVS? MyChart  Will anyone else be joining your video visit? No        Video-Visit Details    Type of service:  Video Visit    Video Start Time: 9:03 AM   Video End Time: 9:22 AM    Originating Location (pt. Location): Home    Distant Location (provider location):  [facility] [place]     Platform used for Video Visit: [facility] Bariatric Nutrition Consultation Note    Reason For Visit: Nutrition Assessment    Minerva Finch is a 20 year old presenting today for follow-up bariatric nutrition consult.   Pt is interested in RNYGB with Dr. Gallegos to help decrease long standing " "reflux that has been unresponsive with medication .  Patient is accompanied by self.  This is pt's second of 6 required nutrition visits prior to surgery.     Pt referred by Dr. Gallegos on January 21, 2021.  Patient with Co-morbidities of obesity including:  Type II DM no  Renal Failure no  Sleep apnea no  Hypertension no   Dyslipidemia no  Joint pain no  Back pain no  GERD yes     ANTHROPOMETRICS:  Estimated body mass index is 35.43 kg/m  as calculated from the following:    Height as of 1/21/21: 1.6 m (5' 3\").    Weight as of 1/21/21: 90.7 kg (200 lb).   Weight: no current      SUPPLEMENT INFORMATION:  Taking iron (ferrious gluconate)   - Plan on starting a MVI    NUTRITION HISTORY:  Per previous RD visit: Pt reports she has been dealing with reflux since she was little, in the past  1 1/2 years she reports the reflux has increase and now barley will keep food down. She will drink a protein shake (Equate) if she does not feel like eating, often this will sit better.   - She has changed her diet in hope of decreasing reflux. She does not eat spicy foods, tomato, carbonated beverages and no greasy foods.   - She reports she will eat her meal at a medium pace.     Beverages: water or milk (2%), [place] tea   B: Bowl of cereal or yogrut with granola   Snack: chips or apple and PB or yogurt.   L: snadwich and some chips OR ordering out at work.   D: Chicken or [employer] restaurant ( sandwiches and salads).       Progress Towards Previous Goals:  Relating To Eating:  Eat slowly (20-30 minutes per meal), chewing foods well (25 chews per bite/applesauce consistency)-Improving, slow eater   Focus on lean protein and non-starchy vegetables/whole fruit at each meal. -continues    Relating to beverages:  Reduce caffeine/carbonation/calorie containing beverages- water, tea, milk (2%) - for reflux ( trying tums first)   Separate fluids from meals by 30 minutes before, during, and after eating-difficult       ADDITIONAL " INFORMATION:  Works two part time jobs     NUTRITION DIAGNOSIS:  Obesity r/t long history of self-monitoring deficit and excessive energy intake aeb BMI >30 kg/m2.  And   Altered GI function r/t persistent reflux and decrease esophageal motility AEB pt report, EGD result and document persistent reflux.      INTERVENTION:  Intervention Provided/Education:   1. Reviewed and modified previous goals  2. Reviewed post-op diet guidelines, vitamins/minerals essential post-operatively, GI anatomy of bariatric surgeries, ways to help prepare for post-op diet guidelines pre-operatively, portion/calorie-control, mindful eating and sources of protein.   3. Dicussed increasing vegetable intake and trying to replicate meals from a restaurant that does not give her as bad reflux.   4. Patient demonstrates understanding.  Provided encouragement and support.   5. AVS via [employer]     Expected Engagement: good    GOALS:  Relating To Eating:  Eat slowly (20-30 minutes per meal), chewing foods well (25 chews per bite/applesauce consistency)  Focus on lean protein and non-starchy vegetables/whole fruit at each meal.  - Try recreating some of the meals that do not cause your reflux as bad.   - Try frozen vegetables   - Try having a vegetable at two meals daily.      Relating to beverages:  Reduce caffeine/carbonation/calorie containing beverages  Separate fluids from meals by 30 minutes before, during, and after eating    Diet Guidelines after Weight-loss Surgery  [email or web address]     Portion Sizes after Weight Loss Surgery  [email or web address]    My Plate Planner_English - Pt Education  [email or web address]    Protein Sources for Weight Loss  [email or web address]     Carbohydrates  [email or web address]       Time spent with patient: 19 minutes.  Brittany Nieto, MS, RD, LD

## 2021-02-10 NOTE — PROGRESS NOTES
"Minerva Finch is a 20 year old who is being evaluated via a billable video visit.      The patient has been notified of following:     \"This video visit will be conducted via a call between you and your physician/provider. We have found that certain health care needs can be provided without the need for an in-person physical exam.  This service lets us provide the care you need with a video conversation.  If a prescription is necessary we can send it directly to your pharmacy.  If lab work is needed we can place an order for that and you can then stop by our lab to have the test done at a later time.    Video visits are billed at different rates depending on your insurance coverage.  Please reach out to your insurance provider with any questions.    If during the course of the call the physician/provider feels a video visit is not appropriate, you will not be charged for this service.\"    Patient has given verbal consent for Video visit? Yes  How would you like to obtain your AVS? MyChart  Will anyone else be joining your video visit? No        Video-Visit Details    Type of service:  Video Visit    Video Start Time: 9:03 AM   Video End Time: 9:22 AM    Originating Location (pt. Location): Home    Distant Location (provider location):  [facility] [place]     Platform used for Video Visit: [facility] Bariatric Nutrition Consultation Note    Reason For Visit: Nutrition Assessment    Minerva Finch is a 20 year old presenting today for follow-up bariatric nutrition consult.   Pt is interested in RNYGB with Dr. Gallegos to help decrease long standing reflux that has been unresponsive with medication .  Patient is accompanied by self.  This is pt's second of 6 required nutrition visits prior to surgery.     Pt referred by Dr. Gallegos on January 21, 2021.  Patient with Co-morbidities of obesity including:  Type II DM no  Renal Failure no  Sleep apnea no  Hypertension no " "  Dyslipidemia no  Joint pain no  Back pain no  GERD yes     ANTHROPOMETRICS:  Estimated body mass index is 35.43 kg/m  as calculated from the following:    Height as of 1/21/21: 1.6 m (5' 3\").    Weight as of 1/21/21: 90.7 kg (200 lb).   Weight: no current      SUPPLEMENT INFORMATION:  Taking iron (ferrious gluconate)   - Plan on starting a MVI    NUTRITION HISTORY:  Per previous RD visit: Pt reports she has been dealing with reflux since she was little, in the past  1 1/2 years she reports the reflux has increase and now barley will keep food down. She will drink a protein shake (Equate) if she does not feel like eating, often this will sit better.   - She has changed her diet in hope of decreasing reflux. She does not eat spicy foods, tomato, carbonated beverages and no greasy foods.   - She reports she will eat her meal at a medium pace.     Beverages: water or milk (2%), [place] tea   B: Bowl of cereal or yogrut with granola   Snack: chips or apple and PB or yogurt.   L: snadwich and some chips OR ordering out at work.   D: Chicken or [employer] restaurant ( sandwiches and salads).       Progress Towards Previous Goals:  Relating To Eating:  Eat slowly (20-30 minutes per meal), chewing foods well (25 chews per bite/applesauce consistency)-Improving, slow eater   Focus on lean protein and non-starchy vegetables/whole fruit at each meal. -continues    Relating to beverages:  Reduce caffeine/carbonation/calorie containing beverages- water, tea, milk (2%) - for reflux ( trying tums first)   Separate fluids from meals by 30 minutes before, during, and after eating-difficult       ADDITIONAL INFORMATION:  Works two part time jobs     NUTRITION DIAGNOSIS:  Obesity r/t long history of self-monitoring deficit and excessive energy intake aeb BMI >30 kg/m2.  And   Altered GI function r/t persistent reflux and decrease esophageal motility AEB pt report, EGD result and document persistent reflux.  "     INTERVENTION:  Intervention Provided/Education:   1. Reviewed and modified previous goals  2. Reviewed post-op diet guidelines, vitamins/minerals essential post-operatively, GI anatomy of bariatric surgeries, ways to help prepare for post-op diet guidelines pre-operatively, portion/calorie-control, mindful eating and sources of protein.   3. Dicussed increasing vegetable intake and trying to replicate meals from a restaurant that does not give her as bad reflux.   4. Patient demonstrates understanding.  Provided encouragement and support.   5. AVS via [employer]     Expected Engagement: good    GOALS:  Relating To Eating:  Eat slowly (20-30 minutes per meal), chewing foods well (25 chews per bite/applesauce consistency)  Focus on lean protein and non-starchy vegetables/whole fruit at each meal.  - Try recreating some of the meals that do not cause your reflux as bad.   - Try frozen vegetables   - Try having a vegetable at two meals daily.      Relating to beverages:  Reduce caffeine/carbonation/calorie containing beverages  Separate fluids from meals by 30 minutes before, during, and after eating    Diet Guidelines after Weight-loss Surgery  [email or web address]     Portion Sizes after Weight Loss Surgery  [email or web address]    My Plate Planner_English - Pt Education  [email or web address]    Protein Sources for Weight Loss  [email or web address]     Carbohydrates  [email or web address]       Time spent with patient: 19 minutes.  Brittany Nieto, MS, RD, LD

## 2021-02-11 ENCOUNTER — TEAM CONFERENCE (OUTPATIENT)
Dept: ENDOCRINOLOGY | Facility: CLINIC | Age: 21
End: 2021-02-11

## 2021-02-11 ENCOUNTER — TELEPHONE (OUTPATIENT)
Dept: ENDOCRINOLOGY | Facility: CLINIC | Age: 21
End: 2021-02-11

## 2021-02-11 NOTE — TELEPHONE ENCOUNTER
Name: Minerva Finch  MRN: [id number]  OR date & surgeon: ashley Gallegos TBD at least after 6 monthly dietitian visits, which would be July 2021 if psych eval is done, labs done and PCP letter of support received by June.  Dietitian: Brittany Nieto RD  Initial wt: 215 (BMI 38.10)  Goal wt: 205  Last wt & date: 1/21 =200 lbs (2/10 = 2 of 6 RD , no updated wt)  Discussion: Linx vs Nissen vs LSG vs RYGB- Discussed at Esophageal Conf 1/26 and last summer.    Concerns: Suicide ideation, never had a dietitian led program or a psych eval.  Concern that reflux is an eating issue. A sleeve would not help. Using a lot of TUMS and still having a lot of reflux, to check with PCP for anti-anxiety meds. On a SSRI. Severe esophagitis. In school in [place]. From [place].  They recommended a RYGB in [place]. Had esophagitis when normal weight.  Brittany Nieto RD saw her 1/21/21 and 2/11/21.  Plan:  [place] to follow-up with her tasklist.    Consider Dr Wu for the psych eval.  Continue monthly dietitian visits.

## 2021-02-11 NOTE — TELEPHONE ENCOUNTER
Reason for call:  Other   Patient called regarding (reason for call): Patient calling with questions on a specific water bottle and has some questions she would like to discuss with Brittany. Patient would like a call back to discuss the [place] water bottle.  Additional comments: none    Phone number to reach patient:  Cell number on file:    Telephone Information:   Mobile [phone]       Best Time:  anytime    Can we leave a detailed message on this number?  YES    Travel screening: Not Applicable

## 2021-02-11 NOTE — TELEPHONE ENCOUNTER
Called Patient to answer questions on the [place] water bottle. Dicussed flavored water options that are not crystal light, this makes her reflux worse.   She is thinking about trying the [place] water bottle, dicussed the difference in ingredients pt believes she may try these to avoid the sugar sweetened beverages.     Pt followed up on Mychart sent to Dr. Gallegos yesterday. RD will notify team.     All questions answered at this time.   Brittany Nieto, MS, RD, LD

## 2021-02-12 ENCOUNTER — MYC MEDICAL ADVICE (OUTPATIENT)
Dept: SURGERY | Facility: CLINIC | Age: 21
End: 2021-02-12

## 2021-02-12 DIAGNOSIS — K22.10 EROSIVE ESOPHAGITIS: Primary | ICD-10-CM

## 2021-02-12 DIAGNOSIS — K21.00 GASTROESOPHAGEAL REFLUX DISEASE WITH ESOPHAGITIS: ICD-10-CM

## 2021-02-12 NOTE — TELEPHONE ENCOUNTER
Patient contacted regarding bariatric surgery process.  Explained that RD visits are to be monthly and cannot be sped up just to complete the 6 required for her insurance company.    Discussed her diet and journaling her intake.  Encouraged patient to look back weekly to see if there is a correlation between worsening symptoms and foods she has eaten.  If so, consider eliminating those foods from her diet.  This will allow her stomach and esophagus to heal.  She is taking the Aciphex as ordered.    Encouraged warm caffeine-free fluids through out the day and to stay away from cold fluids.  Encouraged her to try cottage cheese as a bedtime snack and then warm tea before going to bed.

## 2021-03-03 ENCOUNTER — TELEPHONE (OUTPATIENT)
Dept: ENDOCRINOLOGY | Facility: CLINIC | Age: 21
End: 2021-03-03

## 2021-03-03 NOTE — TELEPHONE ENCOUNTER
Patient called wondering if she could get a tentative surgery date for bariatric surgery with Dr. Gallegos. She was thinking in July. Offered July 27th. She wanted to know what else was needed and I said just the PAC visit and COVID-19 4 days prior to surgery.

## 2021-03-11 ENCOUNTER — VIRTUAL VISIT (OUTPATIENT)
Dept: ENDOCRINOLOGY | Facility: CLINIC | Age: 21
End: 2021-03-11
Payer: COMMERCIAL

## 2021-03-11 DIAGNOSIS — Z71.3 NUTRITIONAL COUNSELING: ICD-10-CM

## 2021-03-11 DIAGNOSIS — E66.9 OBESITY (BMI 30-39.9): Primary | ICD-10-CM

## 2021-03-11 PROCEDURE — 97803 MED NUTRITION INDIV SUBSEQ: CPT | Mod: 95 | Performed by: DIETITIAN, REGISTERED

## 2021-03-11 NOTE — LETTER
"3/11/2021       RE: Minerva Finch  [address]  [place] [address]     Dear Colleague,    Thank you for referring your patient, Minerva Finch, to the [facility] [place] at [facility]. Please see a copy of my visit note below.    Minerva Finch is a 20 year old who is being evaluated via a billable video visit.      The patient has been notified of following:     \"This video visit will be conducted via a call between you and your physician/provider. We have found that certain health care needs can be provided without the need for an in-person physical exam.  This service lets us provide the care you need with a video conversation.  If a prescription is necessary we can send it directly to your pharmacy.  If lab work is needed we can place an order for that and you can then stop by our lab to have the test done at a later time.    Video visits are billed at different rates depending on your insurance coverage.  Please reach out to your insurance provider with any questions.    If during the course of the call the physician/provider feels a video visit is not appropriate, you will not be charged for this service.\"    Patient has given verbal consent for Video visit? Yes  How would you like to obtain your AVS? MyChart  Will anyone else be joining your video visit? No        Video-Visit Details    Type of service:  Video Visit    Video Start Time: 9:33 AM   Video End Time: 10:08 AM    Originating Location (pt. Location): Home    Distant Location (provider location):  [facility] [place]     Platform used for Video Visit: [facility] Bariatric Nutrition Consultation Note    Reason For Visit: Nutrition Assessment    Minerva Finch is a 20 year old presenting today for follow-up bariatric nutrition consult.   Pt is interested in RNYGB with Dr. Gallegos to help decrease long standing " "reflux that has been unresponsive with medication .  Patient is accompanied by self.  This is pt's 3rd of 6 required nutrition visits prior to surgery.     Pt referred by Dr. Gallegos on January 21, 2021.  Patient with Co-morbidities of obesity including:  Type II DM no  Renal Failure no  Sleep apnea no  Hypertension no   Dyslipidemia no  Joint pain no  Back pain no  GERD yes     ANTHROPOMETRICS:  Estimated body mass index is 35.43 kg/m  as calculated from the following:    Height as of 1/21/21: 1.6 m (5' 3\").    Weight as of 1/21/21: 90.7 kg (200 lb).   Weight: 197 lb  ( per pt's)       SUPPLEMENT INFORMATION:  Taking iron (ferrious gluconate)   - Plan on starting a MVI    NUTRITION HISTORY:  Per previous RD visit: Pt reports she has been dealing with reflux since she was little, in the past  1 1/2 years she reports the reflux has increase and now barley will keep food down. She will drink a protein shake (Equate) if she does not feel like eating, often this will sit better.   - She has changed her diet in hope of decreasing reflux. She does not eat spicy foods, tomato, carbonated beverages and no greasy foods.   - She reports she will eat her meal at a medium pace.     Pt reports she is have a difficulty not drinking with meals, and not drinking after meals. Drinks a lot of water after meals.     She has been walking more.     GERD: Still throwing up everyday, less violent, depending on what eating.    Progress Towards Previous Goals:   Relating To Eating:  Eat slowly (20-30 minutes per meal), chewing foods well (25 chews per bite/applesauce consistency)-Improving, slower eater.   Focus on lean protein and non-starchy vegetables/whole fruit at each meal.-Continues   - Try recreating some of the meals that do not cause your reflux as bad.   - Try frozen vegetables   - Try having a vegetable at two meals daily.      Relating to beverages:  Reduce caffeine/carbonation/calorie containing beverages-Met, more water, tried " to take out pop  (does not like the way she feels after it) 4 days without pop, once in a while coffee  Separate fluids from meals by 30 minutes before, during, and after eating-Continues, still difficult       ADDITIONAL INFORMATION:  Works two part time jobs     NUTRITION DIAGNOSIS:  Obesity r/t long history of self-monitoring deficit and excessive energy intake aeb BMI >30 kg/m2.  And   Altered GI function r/t persistent reflux and decrease esophageal motility AEB pt report, EGD result and document persistent reflux.      INTERVENTION:  Intervention Provided/Education:   1. Reviewed and modified previous goals  2. Reviewed post-op diet guidelines, vitamins/minerals essential post-operatively, GI anatomy of bariatric surgeries, ways to help prepare for post-op diet guidelines pre-operatively, portion/calorie-control, mindful eating and sources of protein.   3. Answered patients questions about  fluids from beverages and diet after surgery.   4. Patient demonstrates understanding.  Provided encouragement and support.   5. AVS via [employer]     Expected Engagement: good    GOALS:  Relating To Eating:  Eat slowly (20-30 minutes per meal), chewing foods well (25 chews per bite/applesauce consistency)  Focus on lean protein and non-starchy vegetables/whole fruit at each meal.  - Try recreating some of the meals that do not cause your reflux as bad.   - Try frozen vegetables   - Try having a vegetable at two meals daily.      Relating to beverages:  Reduce caffeine/carbonation/calorie containing beverages  Separate fluids from meals by 30 minutes before, during, and after eating    Diet Guidelines after Weight-loss Surgery  [email or web address]     Your Stage 5 Diet: Regular Foods  [email or web address]    Portion Sizes after Weight Loss Surgery  [email or web address]    My Plate Planner_English - Pt Education  [email or web address]    Protein Sources for Weight  Loss  [email or web address]     Carbohydrates  [email or web address]       Time spent with patient: 35 minutes.  Brittany Nieto MS, RD, LD

## 2021-03-11 NOTE — PROGRESS NOTES
"Minerva Finch is a 20 year old who is being evaluated via a billable video visit.      The patient has been notified of following:     \"This video visit will be conducted via a call between you and your physician/provider. We have found that certain health care needs can be provided without the need for an in-person physical exam.  This service lets us provide the care you need with a video conversation.  If a prescription is necessary we can send it directly to your pharmacy.  If lab work is needed we can place an order for that and you can then stop by our lab to have the test done at a later time.    Video visits are billed at different rates depending on your insurance coverage.  Please reach out to your insurance provider with any questions.    If during the course of the call the physician/provider feels a video visit is not appropriate, you will not be charged for this service.\"    Patient has given verbal consent for Video visit? Yes  How would you like to obtain your AVS? MyChart  Will anyone else be joining your video visit? No        Video-Visit Details    Type of service:  Video Visit    Video Start Time: 9:33 AM   Video End Time: 10:08 AM    Originating Location (pt. Location): Home    Distant Location (provider location):  [facility] [place]     Platform used for Video Visit: [facility] Bariatric Nutrition Consultation Note    Reason For Visit: Nutrition Assessment    Minerva Finch is a 20 year old presenting today for follow-up bariatric nutrition consult.   Pt is interested in RNYGB with Dr. Gallegos to help decrease long standing reflux that has been unresponsive with medication .  Patient is accompanied by self.  This is pt's 3rd of 6 required nutrition visits prior to surgery.     Pt referred by Dr. Gallegos on January 21, 2021.  Patient with Co-morbidities of obesity including:  Type II DM no  Renal Failure no  Sleep apnea no  Hypertension no   Dyslipidemia " "no  Joint pain no  Back pain no  GERD yes     ANTHROPOMETRICS:  Estimated body mass index is 35.43 kg/m  as calculated from the following:    Height as of 1/21/21: 1.6 m (5' 3\").    Weight as of 1/21/21: 90.7 kg (200 lb).   Weight: 197 lb  ( per pt's)       SUPPLEMENT INFORMATION:  Taking iron (ferrious gluconate)   - Plan on starting a MVI    NUTRITION HISTORY:  Per previous RD visit: Pt reports she has been dealing with reflux since she was little, in the past  1 1/2 years she reports the reflux has increase and now barley will keep food down. She will drink a protein shake (Equate) if she does not feel like eating, often this will sit better.   - She has changed her diet in hope of decreasing reflux. She does not eat spicy foods, tomato, carbonated beverages and no greasy foods.   - She reports she will eat her meal at a medium pace.     Pt reports she is have a difficulty not drinking with meals, and not drinking after meals. Drinks a lot of water after meals.     She has been walking more.     GERD: Still throwing up everyday, less violent, depending on what eating.    Progress Towards Previous Goals:   Relating To Eating:  Eat slowly (20-30 minutes per meal), chewing foods well (25 chews per bite/applesauce consistency)-Improving, slower eater.   Focus on lean protein and non-starchy vegetables/whole fruit at each meal.-Continues   - Try recreating some of the meals that do not cause your reflux as bad.   - Try frozen vegetables   - Try having a vegetable at two meals daily.      Relating to beverages:  Reduce caffeine/carbonation/calorie containing beverages-Met, more water, tried to take out pop  (does not like the way she feels after it) 4 days without pop, once in a while coffee  Separate fluids from meals by 30 minutes before, during, and after eating-Continues, still difficult       ADDITIONAL INFORMATION:  Works two part time jobs     NUTRITION DIAGNOSIS:  Obesity r/t long history of self-monitoring " deficit and excessive energy intake aeb BMI >30 kg/m2.  And   Altered GI function r/t persistent reflux and decrease esophageal motility AEB pt report, EGD result and document persistent reflux.      INTERVENTION:  Intervention Provided/Education:   1. Reviewed and modified previous goals  2. Reviewed post-op diet guidelines, vitamins/minerals essential post-operatively, GI anatomy of bariatric surgeries, ways to help prepare for post-op diet guidelines pre-operatively, portion/calorie-control, mindful eating and sources of protein.   3. Answered patients questions about  fluids from beverages and diet after surgery.   4. Patient demonstrates understanding.  Provided encouragement and support.   5. AVS via [employer]     Expected Engagement: good    GOALS:  Relating To Eating:  Eat slowly (20-30 minutes per meal), chewing foods well (25 chews per bite/applesauce consistency)  Focus on lean protein and non-starchy vegetables/whole fruit at each meal.  - Try recreating some of the meals that do not cause your reflux as bad.   - Try frozen vegetables   - Try having a vegetable at two meals daily.      Relating to beverages:  Reduce caffeine/carbonation/calorie containing beverages  Separate fluids from meals by 30 minutes before, during, and after eating    Diet Guidelines after Weight-loss Surgery  [email or web address]     Your Stage 5 Diet: Regular Foods  [email or web address]    Portion Sizes after Weight Loss Surgery  [email or web address]    My Plate Planner_English - Pt Education  [email or web address]    Protein Sources for Weight Loss  [email or web address]     Carbohydrates  [email or web address]       Time spent with patient: 35 minutes.  Brittany Nieto, MS, RD, LD

## 2021-03-12 NOTE — PATIENT INSTRUCTIONS
GOALS:  Relating To Eating:  Eat slowly (20-30 minutes per meal), chewing foods well (25 chews per bite/applesauce consistency)  Focus on lean protein and non-starchy vegetables/whole fruit at each meal.  - Try recreating some of the meals that do not cause your reflux as bad.   - Try frozen vegetables   - Try having a vegetable at two meals daily.      Relating to beverages:  Reduce caffeine/carbonation/calorie containing beverages  Separate fluids from meals by 30 minutes before, during, and after eating    Diet Guidelines after Weight-loss Surgery  [email or web address]     Your Stage 5 Diet: Regular Foods  [email or web address]    Portion Sizes after Weight Loss Surgery  [email or web address]    My Plate Planner_English - Pt Education  [email or web address]    Protein Sources for Weight Loss  [email or web address]     Carbohydrates  [email or web address]

## 2021-03-16 ENCOUNTER — VIRTUAL VISIT (OUTPATIENT)
Dept: PSYCHOLOGY | Facility: CLINIC | Age: 21
End: 2021-03-16
Attending: SURGERY
Payer: COMMERCIAL

## 2021-03-16 ENCOUNTER — CARE COORDINATION (OUTPATIENT)
Dept: ENDOCRINOLOGY | Facility: CLINIC | Age: 21
End: 2021-03-16

## 2021-03-16 DIAGNOSIS — K21.00 GASTROESOPHAGEAL REFLUX DISEASE WITH ESOPHAGITIS, UNSPECIFIED WHETHER HEMORRHAGE: ICD-10-CM

## 2021-03-16 DIAGNOSIS — F41.1 GAD (GENERALIZED ANXIETY DISORDER): ICD-10-CM

## 2021-03-16 DIAGNOSIS — F33.42 RECURRENT MAJOR DEPRESSIVE DISORDER, IN FULL REMISSION (H): ICD-10-CM

## 2021-03-16 DIAGNOSIS — E66.9 OBESITY (BMI 30-39.9): Primary | ICD-10-CM

## 2021-03-16 PROCEDURE — 90791 PSYCH DIAGNOSTIC EVALUATION: CPT | Mod: 95 | Performed by: STUDENT IN AN ORGANIZED HEALTH CARE EDUCATION/TRAINING PROGRAM

## 2021-03-16 ASSESSMENT — ANXIETY QUESTIONNAIRES
1. FEELING NERVOUS, ANXIOUS, OR ON EDGE: SEVERAL DAYS
3. WORRYING TOO MUCH ABOUT DIFFERENT THINGS: SEVERAL DAYS
2. NOT BEING ABLE TO STOP OR CONTROL WORRYING: NOT AT ALL
5. BEING SO RESTLESS THAT IT IS HARD TO SIT STILL: NOT AT ALL
6. BECOMING EASILY ANNOYED OR IRRITABLE: NOT AT ALL
IF YOU CHECKED OFF ANY PROBLEMS ON THIS QUESTIONNAIRE, HOW DIFFICULT HAVE THESE PROBLEMS MADE IT FOR YOU TO DO YOUR WORK, TAKE CARE OF THINGS AT HOME, OR GET ALONG WITH OTHER PEOPLE: NOT DIFFICULT AT ALL
7. FEELING AFRAID AS IF SOMETHING AWFUL MIGHT HAPPEN: NOT AT ALL
GAD7 TOTAL SCORE: 2

## 2021-03-16 ASSESSMENT — PATIENT HEALTH QUESTIONNAIRE - PHQ9
5. POOR APPETITE OR OVEREATING: NOT AT ALL
SUM OF ALL RESPONSES TO PHQ QUESTIONS 1-9: 3

## 2021-03-16 NOTE — PROGRESS NOTES
Patient completed her psych evaluation. Was counseled to ask about risks of surgery.  Discussed surgery risks and ways to avoid the most common.  Discussed patient's progress with diet conversion.  She states she has eliminated soda and is working on eliminating other unhealthy foods.

## 2021-03-16 NOTE — PROGRESS NOTES
"  Health Psychology                  Clinic    Department of Medicine  Monique Eric, PhD, LP [phone]                          Clinics and Surgery Center  [facility] Dash Barba, PhD, LP [phone]                  3rd Floor  [place] Mail Code 741   Conor Velasco, PhD, ABPP, LP [phone]     [facility], [address],  Yoli Wu,  PhD, LP [phone]            [place] [address] Jennifer Larson, PhD, LP [phone]     Roxane Adrian, PhD [phone]     Confidential Summary of Standard Psychodiagnostic Evaluation* - Telehealth Visit    Minerva Finch is a 20-year-old female who is being evaluated via a billable video visit.       The patient has been notified of following:      \"This video visit will be conducted via a call between you and your physician/provider. We have found that certain health care needs can be provided without the need for an in-person physical exam.  This service lets us provide the care you need with a video conversation.  If a prescription is necessary we can send it directly to your pharmacy.  If lab work is needed we can place an order for that and you can then stop by our lab to have the test done at a later time.     Video visits are billed at different rates depending on your insurance coverage.  Please reach out to your insurance provider with any questions.     If during the course of the call the physician/provider feels a video visit is not appropriate, you will not be charged for this service.\"     Patient has given verbal consent for Video visit? Yes  How would you like to obtain your AVS? N/A  If you are dropped from the video visit, the video invite should be resent to: Lizetht  Will anyone else be joining your video visit? No    Type of service: Telemedicine Psychological Assessment  Time of service:     Date: 3/16/21    Time Service Began: 9:00    Time Service Ended: 10:30  Reason that " "telemedicine is appropriate: Public health regulations due to COVID-19 pandemic/state of emergency  Mode of transmission: Secure real time interactive audio and visual telecommunication system via [email or web address]  Location of originating and distant sites:    Originating site (patient location): Patient's home    Distant site (provider site): Provider's home    Date of Service:  3/16/21    Referral Source:  Dr. Gallegos    Reason for Referral:  Complete preoperative bariatric surgery psychological evaluation to determine if psychological factors might interfere with the patient's ability to comply with presurgical procedures and rigorous postsurgical behavioral guidelines.     Sources of Information:  Information was obtained from a clinical interview with the patient, review of available medical records, and administration of psychological assessments.     History of Presenting Concerns:  Minerva Finch is a 20 year old female with class II obesity and GERD with esophagitis considering Rashida-en-Y gastric bypass surgery with Dr. Gallegos. The patient presented to the weight loss surgery program with an initial consult weight of 200 pounds on 1/21/21 (BMI = 35.43 kg/m2). Co-morbidities associated with obesity include the following: GERD.    Reviewed Ms. Finch's experiences with the medical weight management team and progress towards presurgical goals. She described working with dietician, they have had multiple visits.  Ms. Finch is very engaged, taking notes from their visits.  Specifically she has been working towards chewing her food more,  liquids from food, and eliminating soda intake.     Discussed Ms. Finch's history with GERD symptoms.  She said she has had GERD with esophagitis since infancy.  She reported a difficult diagnostic process in which her mother was considered a \"nervous first mom\" and the patient's symptoms were not always addressed. Her symptoms were managed with medications until around 12-14 " "years old at which point symptoms worsened. She reported multiple procedures (I.e. EGD) and inpatient and outpatient intervention.  She met with a specialist near her hometown but reported she was considered too complex and was referred to the [facility].      Recently, Ms. Finch said her symptoms have again worsened and are the worst they have every been.  She described throwing up daily, almost everything she eats.  She described frequent, painful heartburn and having reflux feel \"stuck\" after a meal.  Her symptoms are worse at night.  She has accommodated her symptoms by adjusting her eating patterns and taking medications, taking Zofran currently.     Ms. Finch's GERD symptoms lead to distress and are disruptive for her.  She has difficulty maintaining a regular schedule because of it and worries about how it affects her boyfriend with whom she lives. The pain can be high, she said it gets up to 8 or 9 out of 10.     Regarding surgery, she reported a good understanding of her options and the RNYGB surgery specifically.  She was able to describe the basic processes and how it would ideally improve her GERD symptoms.  She has met with Dr. Gallegos already about her options and based on her description of the visit she has been actively engaged in surgical discussions. She also reported her own research about the procedures and wants to speak more with the surgeon about surgical risks based on her medical history. Ms. Finch communicated a good understanding of post-surgical recovery processes and short-term and long-term dietary changes.      Ms. Finch is hoping for reduction in reflux symptoms.  As weight loss is not her primary goal for surgery she does not have a specific weight loss goal in mind but reported some worry about how rapid weight loss could lead to excess skin. She reported appropriate awareness of dynamics of weight loss and has been increasing physical activity gradually, walking, " and doing other physical activity to promote good outcomes.     Medical History:    Past Medical History:   Diagnosis Date     Abdominal pain      ADHD (attention deficit hyperactivity disorder)      Anxiety      Depression      Gastroesophageal reflux disease      Gastroesophageal reflux disease with esophagitis      Intractable vomiting with nausea      Obesity     BMI 37.33 using vitals from 2/3/17     Uncomplicated asthma        Past Surgical History:   Procedure Laterality Date     ENDOSCOPY       ESOPHAGOSCOPY, GASTROSCOPY, DUODENOSCOPY (EGD), COMBINED N/A 2/9/2017    Procedure: COMBINED ESOPHAGOSCOPY, GASTROSCOPY, DUODENOSCOPY (EGD), BIOPSY SINGLE OR MULTIPLE;  Surgeon: David Valle MD;  Location: [facility]      ESOPHAGOSCOPY, GASTROSCOPY, DUODENOSCOPY (EGD), COMBINED N/A 7/21/2017    Procedure: COMBINED ESOPHAGOSCOPY, GASTROSCOPY, DUODENOSCOPY (EGD), BIOPSY SINGLE OR MULTIPLE;  Upper endoscopy with biopsy;  Surgeon: Yancy Chacko MD;  Location: [facility]      ESOPHAGOSCOPY, GASTROSCOPY, DUODENOSCOPY (EGD), COMBINED N/A 10/28/2020    Procedure: ESOPHAGOGASTRODUODENOSCOPY, WITH BIOPSY;  Surgeon: Juni Callahan DO;  Location: [facility] ESOPH/GAS REFLUX TEST W NASAL IMPED >1 HR N/A 4/3/2017    Procedure: ESOPHAGEAL IMPEDENCE FUNCTION TEST WITH 24 HOUR PH GREATER THAN 1 HOUR;  Surgeon: Yancy Chacko MD;  Location: [facility]      tonsillectomy and adenoidectomy         Current Outpatient Medications   Medication     calcium carbonate (TUMS) 500 MG chewable tablet     Ferrous Gluconate 324 (37.5 Fe) MG TABS     FLUoxetine (PROZAC) 20 MG capsule     ondansetron (ZOFRAN) 4 MG tablet     RABEprazole (ACIPHEX) 20 MG EC tablet     No current facility-administered medications for this visit.      Facility-Administered Medications Ordered in Other Visits   Medication     barium sulfate (EZ-DISK) tablet 700 mg     lidocaine (XYLOCAINE) 2 % topical gel     sod  bicarbonate-citric acid-simethicone (EZ GAS) 2.21-1.53-0.04 g packet 4 g         Regarding adherence to medical recommendations, Ms. Finch described good to excellent adherence.  She is diligent about GERD medications and following dietary patterns to manage symptoms.     Psychiatric History:  Ms. Finch reported a history of symptoms of depression and anxiety which were most prominent around the age of 14.  She linked these symptoms to psychosocial stressors such as family stress and poor health. She reported engaging in self-harm behaviors and multiple suicide attempts which were not lethal when described.  She said her parents did not know about the suicide attempts.  Self-harm behaviors included cutting and burning herself and occurred regularly for roughly one year and then she stopped when she began treatment and she has not engaged in self-harm behaviors for the past five years.     Ms. Finch was admitted to an inpatient psychiatric hospital for a few weeks which was the most helpful for her to begin managing her symptoms.  She continued with regular outpatient therapy with a school-based provider and participated in DBT for two years.  She reported improvement in depression and anxiety symptoms by senior year because of therapy and medications.     Since graduating from DBT, Ms. Finch continues using coping skills she learned. She also has recognized the importance of adaptive health behaviors and routines such as showering regularly and getting enough sleep to help regulate her emotions. Additional coping skills include: interacting with her emotional support animal, coloring, and surrounding herself with people she cares about (I.e. partner, friends, mom).  She also takes Prozac to manage symptoms.  Past medications include Lexapro and Venlafaxine.     Ms. Finch has a WRAP (Wellness Recovery Action Plan) plan in place that was created in therapy to help her and others recognize signs of relapse/set  back and intervene appropriately. We reviewed this together and her boyfriend and mother are most involved in this plan. She reported very high motivation to continue with treatment and coping skills that she is using. If she does notice symptoms increasing such as sadness, difficulty getting out of bed, urge to harm herself, she would either reconnect with a therapist or speak with PCP about a medication change.     Ms. Finch described symptoms of anxiety as: frequently over-thinking, rapid thinking, and difficulty controlling worry.  Symptoms can interfere with sleep and concentration. She said that currently she can get episodes of worrying but that she uses coping skills to ground herself and manage. Her prescribing provider for psychotropic medications if her PCP.     Ms. Finch denied history of OCD, psychosis, PTSD, olga, and disordered eating.  She did report a history of ADHD symptoms and said she underwent testing for this in elementary school. She tried medications in the past but denied current medication therapy and also denied impairing symptoms of ADHD.     Family mental health history: Ms. Finch said she does not know about her father's side of the family but that her maternal grandmother was diagnosed with depression.      Substance Use History:  Ms. Finch denied alcohol use, denied regular drug use, has tried marijuana. Denied tobacco/nicotine. She reported infrequent caffeine use.  Denied history of substance use disorder symptoms and treatment. Denied other patterns of addictive behaviors such as gambling, compulsive shopping.     Social History:  Ms. Finch grew up with her mother and two younger siblings. Her mother  Ms. Finch's adoptive dad when she was [age] years old.    After completing High School, Ms. Finch attended community college for a year. She was unable to attend regularly because of health symptoms and not having an effective medication routine for GERD symptoms.       Ms. Finch currently works two part-time jobs at around 40 hours/week total.  She works in retail and at a hair salon and wants to pursue this as a career for herself.  Previously she worked as a certified  - was working before pandemic but then the job changed.  She said she enjoyed his job but noticed it was challenging to focus on other people when she was also taking care of herself.     Ms. Finch has strong social support from her partner of three years, friends, and her mother.     Psychological Assessment:  The patient completed the following battery of assessments during this psychological evaluation: World Health Organization Disability Assessment Schedule 2.0 12-item (WHODAS), Patient Health Questionnaire-9 (PHQ-9), Generalized Anxiety Disorder-7 screener (EDDIE-7), CAGE Questionnaire Adapted to Include Drugs (CAGE-AID), and the [place] Multiphasic Personality Tpkpvhkwr-1-Jwiqjkwxpmop Form (MMPI-2-RF).     The WHODAS measures disability and functional impairment due to health conditions including diseases, illnesses, injuries, mental or emotional problems, and problems with alcohol or drugs. The possible range of scores is 12-60 and higher scores indicate higher levels of disability.   WHODAS 2.0 Total Score 3/16/2021   Total Score 15       The PHQ-9 is an instrument for screening, diagnosing, monitoring and measuring the severity of depression. Scores of 5, 10, 15, and 20 represent cutpoints for mild, moderate, moderately severe and severe depressive symptoms, respectively.   PHQ-9 SCORE 3/16/2021   PHQ-9 Total Score 3       The EDDIE-7 is an instrument for screening, diagnosing, monitoring and measuring the severity of anxiety. Scores of 5, 10, and 15 represent cutpoints for mild, moderate, and severe anxiety symptoms, respectively.  EDDIE-7 SCORE 3/16/2021   Total Score 2       The CAGE-AID questionnaire is used to screen for alcohol or drug abuse and dependence in adults. A  CAGE-AID score  > 1 is a positive screen, suggesting further discussion is needed to determine if evaluation for alcohol or substance abuse is appropriate. A score > 2 is considered clinically significant, suggesting further evaluation of alcohol or substance-related problems is indicated.  CAGE-AID Total Score 3/16/2021   Total Score 0       Due to the COVID-19 pandemic and importance of maintaining test security, the MMPI-2-RF was not administered.  Sufficient information was gathered from clinical interview and symptom-based questionnaires.       Mental Status Examination:  Appearance/Behavior/Orientation: Patient was available for scheduled visit, appropriately groomed and dressed, and demonstrated good eye contact. Alert and oriented to person, place, time, and situation. No evidence of psychomotor agitation.     Cooperation/Reliability: Patient was open and cooperative.   Speech/Language: Speech was clear, coherent, and of normal rate, rhythm and volume.    Thought Form: Overall logical and organized.   Thought Content: Appropriate to interview and situation  Cognition/Memory: Not formally assessed, but no difficulties apparent upon interview.   Attention/Concentration: Good throughout interview.    Fund of knowledge: Consistent with age and level of education.    Abstract reasoning: Not assessed.   Judgment: Intact.    Mood/Affect: Mood stable, good; appropriate range of affect.    Insight/Motivation: Good insight into influence of emotions and behavior on weight. Motivation for bariatric surgery appears high.    Suicide/Assault: Patient denies suicidal or assaultive ideation, plan, or intent    Impression:  Minerva Finch is a 20 year old female with class II obesity considering bariatric surgery, likely casi-en-y gastric bypass to manage symptoms of GERD with esophagitis. She described appropriate understanding of general risks and benefits of the procedure and is considered able to provide informed  consent.  She described good understanding and practice of post-surgical dietary behavior requirements. She has a history of symptoms of anxiety and depression as well as self-harm and suicidal behaviors which have been appropriately managed with outpatient therapy and medications.  Her symptoms are not currently a contraindication to surgery and she has active plans in place to prevent and manage relapse.     Diagnosis:  Class II obesity  GERD with esophagitis  Major depressive disorder, recurrent, severe, in remission  Generalized anxiety disorder      Recommendation/Plan:  Minerva Finch presents as a reasonably good candidate for bariatric surgery at this time and is recommended for weight loss surgery. No mental health follow-up required before surgery. The patient may follow-up with Health Psychology as needed to enhance preparedness for surgery or to discuss postoperative adjustment.  She should continue to meet with her primary care provider for medication management of symptoms and she has a plan in place to reconnect with outpatient therapist if needed.          Roxane Adrian, PhD,   Health Psychology Fellow  [phone]    *In accordance with the Rules of the [place] Board of Psychology, it is noted that psychological descriptions and scientific procedures underlying psychological evaluations have limitations.  Absolute predictions cannot be made based on information in this report.    Billing to include:  1 unit of 84485     I did not see this patient directly. I have read and agree with the contents of this note. Yoli Wu, PhD, , April 16, 2021

## 2021-03-17 ASSESSMENT — ANXIETY QUESTIONNAIRES: GAD7 TOTAL SCORE: 2

## 2021-04-08 ENCOUNTER — VIRTUAL VISIT (OUTPATIENT)
Dept: ENDOCRINOLOGY | Facility: CLINIC | Age: 21
End: 2021-04-08
Payer: COMMERCIAL

## 2021-04-08 DIAGNOSIS — Z71.3 NUTRITIONAL COUNSELING: ICD-10-CM

## 2021-04-08 DIAGNOSIS — K21.00 GASTROESOPHAGEAL REFLUX DISEASE WITH ESOPHAGITIS, UNSPECIFIED WHETHER HEMORRHAGE: ICD-10-CM

## 2021-04-08 DIAGNOSIS — E66.9 OBESITY (BMI 30-39.9): Primary | ICD-10-CM

## 2021-04-08 DIAGNOSIS — R12 HEARTBURN: ICD-10-CM

## 2021-04-08 PROCEDURE — 97803 MED NUTRITION INDIV SUBSEQ: CPT | Mod: 95 | Performed by: DIETITIAN, REGISTERED

## 2021-04-08 NOTE — PROGRESS NOTES
"Minerva Finch is a 20 year old who is being evaluated via a billable video visit.      The patient has been notified of following:     \"This video visit will be conducted via a call between you and your physician/provider. We have found that certain health care needs can be provided without the need for an in-person physical exam.  This service lets us provide the care you need with a video conversation.  If a prescription is necessary we can send it directly to your pharmacy.  If lab work is needed we can place an order for that and you can then stop by our lab to have the test done at a later time.    Video visits are billed at different rates depending on your insurance coverage.  Please reach out to your insurance provider with any questions.    If during the course of the call the physician/provider feels a video visit is not appropriate, you will not be charged for this service.\"    Patient has given verbal consent for Video visit? Yes  How would you like to obtain your AVS? MyChart  Will anyone else be joining your video visit? No      Video-Visit Details    Type of service:  Video Visit    Video Start Time: 9:58 AM   Video End Time: 10:24 AM    Originating Location (pt. Location): Home    Distant Location (provider location):  [facility] [place]     Platform used for Video Visit: [facility] Bariatric Nutrition Consultation Note    Reason For Visit: Nutrition Assessment    Mienrva Finch is a 20 year old presenting today for follow-up bariatric nutrition consult.   Pt is interested in RNYGB with Dr. Gallegos to help decrease long standing reflux that has been unresponsive with medication .  Patient is accompanied by self.  This is pt's 4th of 6 required nutrition visits prior to surgery.     Pt referred by Dr. Gallegos on January 21, 2021.  Patient with Co-morbidities of obesity including:  Type II DM no  Renal Failure no  Sleep apnea no  Hypertension no   Dyslipidemia " "no  Joint pain no  Back pain no  GERD yes     ANTHROPOMETRICS:  Estimated body mass index is 35.43 kg/m  as calculated from the following:    Height as of 1/21/21: 1.6 m (5' 3\").    Weight as of 1/21/21: 90.7 kg (200 lb).   Weight: 210 lb  ( per pt's report)     SUPPLEMENT INFORMATION:   Taking iron (ferrious gluconate)   - Plan on starting a MVI    NUTRITION HISTORY:  Per previous RD visit: Pt reports she has been dealing with reflux since she was little, in the past  1 1/2 years she reports the reflux has increase and now barley will keep food down. She will drink a protein shake (Equate) if she does not feel like eating, often this will sit better.   - She has changed her diet in hope of decreasing reflux. She does not eat spicy foods, tomato, carbonated beverages and no greasy foods.   - She reports she will eat her meal at a medium pace.       GERD:Pt report GERD has been worse: \"really bad last night 3-4 times throwing up\"    She is not eating past 7 pm to try to help nighttime reflux, though may snack in the evening. Notices when she does snack she has more reflux at night.     Over the past month she has not been able to focus on th goals as much as she would like, eating out more and not aware of her portion sizes or what she was eating.     Diet Recall:  B: Skips or something small   L : Shake for lunch: Herbal life product  D: Varies, eating out     Progress Towards Previous Goals:   Relating To Eating:  Eat slowly (20-30 minutes per meal), chewing foods well (25 chews per bite/applesauce consistency) -Improving tryint to eat slower   Focus on lean protein and non-starchy vegetables/whole fruit at each meal. -Continues, does not feel as great with red meat, eating more chicken verse red meat and pork chop and hamburger.   - Try recreating some of the meals that do not cause your reflux as bad.   - Try frozen vegetables   - Try having a vegetable at two meals daily.      Relating to beverages:  Reduce " caffeine/carbonation/calorie containing beverages -Improving, had a pop, more water ( trying to drink less)    Separate fluids from meals by 30 minutes before, during, and after eating -Improving more on the not drinking during meals       ADDITIONAL INFORMATION:  Works two part time jobs     NUTRITION DIAGNOSIS:  Obesity r/t long history of self-monitoring deficit and excessive energy intake aeb BMI >30 kg/m2.  And   Altered GI function r/t persistent reflux and decrease esophageal motility AEB pt report, EGD result and document persistent reflux.     INTERVENTION:  Intervention Provided/Education:   1. Reviewed and modified previous goals  2. Reviewed post-op diet guidelines, vitamins/minerals essential post-operatively, GI anatomy of bariatric surgeries, ways to help prepare for post-op diet guidelines pre-operatively, portion/calorie-control, mindful eating and sources of protein.   3. Answered patients questions about  fluids from beverages and diet after surgery.   4. Patient demonstrates understanding.  Provided encouragement and support.   5. AVS via [employer]     Expected Engagement: good    GOALS:  Relating To Eating:  Eat slowly (20-30 minutes per meal), chewing foods well (25 chews per bite/applesauce consistency)  Focus on lean protein and non-starchy vegetables/whole fruit at each meal.  - Try recreating some of the meals that do not cause your reflux as bad.   - Try frozen vegetables   - Try having a vegetable at two meals daily.      Relating to beverages:  Reduce caffeine/carbonation/calorie containing beverages  Separate fluids from meals by 30 minutes before, during, and after eating    Diet Guidelines after Weight-loss Surgery  [email or web address]     Your Stage 5 Diet: Regular Foods  [email or web address]    Portion Sizes after Weight Loss Surgery  [email or web address]    My Plate Planner_English - Pt  Education  [email or web address]    Protein Sources for Weight Loss  [email or web address]     Carbohydrates  [email or web address]       Time spent with patient: 26  minutes.  Brittany Nieto, MS, RD, LD

## 2021-04-08 NOTE — LETTER
"4/8/2021       RE: Minerva Finch  [address]   [place] [address]     Dear Colleague,    Thank you for referring your patient, Minerva Finch, to the [facility] [place] at [facility]. Please see a copy of my visit note below.    Minerva Finch is a 20 year old who is being evaluated via a billable video visit.      The patient has been notified of following:     \"This video visit will be conducted via a call between you and your physician/provider. We have found that certain health care needs can be provided without the need for an in-person physical exam.  This service lets us provide the care you need with a video conversation.  If a prescription is necessary we can send it directly to your pharmacy.  If lab work is needed we can place an order for that and you can then stop by our lab to have the test done at a later time.    Video visits are billed at different rates depending on your insurance coverage.  Please reach out to your insurance provider with any questions.    If during the course of the call the physician/provider feels a video visit is not appropriate, you will not be charged for this service.\"    Patient has given verbal consent for Video visit? Yes  How would you like to obtain your AVS? MyChart  Will anyone else be joining your video visit? No      Video-Visit Details    Type of service:  Video Visit    Video Start Time: 9:58 AM   Video End Time: 10:24 AM    Originating Location (pt. Location): Home    Distant Location (provider location):  [facility] [place]     Platform used for Video Visit: [facility] Bariatric Nutrition Consultation Note    Reason For Visit: Nutrition Assessment    Minerva Finch is a 20 year old presenting today for follow-up bariatric nutrition consult.   Pt is interested in RNYGB with Dr. Gallegos to help decrease long standing reflux " "that has been unresponsive with medication .  Patient is accompanied by self.  This is pt's 4th of 6 required nutrition visits prior to surgery.     Pt referred by Dr. Gallegos on January 21, 2021.  Patient with Co-morbidities of obesity including:  Type II DM no  Renal Failure no  Sleep apnea no  Hypertension no   Dyslipidemia no  Joint pain no  Back pain no  GERD yes     ANTHROPOMETRICS:  Estimated body mass index is 35.43 kg/m  as calculated from the following:    Height as of 1/21/21: 1.6 m (5' 3\").    Weight as of 1/21/21: 90.7 kg (200 lb).   Weight: 210 lb  ( per pt's report)     SUPPLEMENT INFORMATION:   Taking iron (ferrious gluconate)   - Plan on starting a MVI    NUTRITION HISTORY:  Per previous RD visit: Pt reports she has been dealing with reflux since she was little, in the past  1 1/2 years she reports the reflux has increase and now barley will keep food down. She will drink a protein shake (Equate) if she does not feel like eating, often this will sit better.   - She has changed her diet in hope of decreasing reflux. She does not eat spicy foods, tomato, carbonated beverages and no greasy foods.   - She reports she will eat her meal at a medium pace.       GERD:Pt report GERD has been worse: \"really bad last night 3-4 times throwing up\"    She is not eating past 7 pm to try to help nighttime reflux, though may snack in the evening. Notices when she does snack she has more reflux at night.     Over the past month she has not been able to focus on th goals as much as she would like, eating out more and not aware of her portion sizes or what she was eating.     Diet Recall:  B: Skips or something small   L : Shake for lunch: Herbal life product  D: Varies, eating out     Progress Towards Previous Goals:   Relating To Eating:  Eat slowly (20-30 minutes per meal), chewing foods well (25 chews per bite/applesauce consistency) -Improving tryint to eat slower   Focus on lean protein and non-starchy " vegetables/whole fruit at each meal. -Continues, does not feel as great with red meat, eating more chicken verse red meat and pork chop and hamburger.   - Try recreating some of the meals that do not cause your reflux as bad.   - Try frozen vegetables   - Try having a vegetable at two meals daily.      Relating to beverages:  Reduce caffeine/carbonation/calorie containing beverages -Improving, had a pop, more water ( trying to drink less)    Separate fluids from meals by 30 minutes before, during, and after eating -Improving more on the not drinking during meals       ADDITIONAL INFORMATION:  Works two part time jobs     NUTRITION DIAGNOSIS:  Obesity r/t long history of self-monitoring deficit and excessive energy intake aeb BMI >30 kg/m2.  And   Altered GI function r/t persistent reflux and decrease esophageal motility AEB pt report, EGD result and document persistent reflux.     INTERVENTION:  Intervention Provided/Education:   1. Reviewed and modified previous goals  2. Reviewed post-op diet guidelines, vitamins/minerals essential post-operatively, GI anatomy of bariatric surgeries, ways to help prepare for post-op diet guidelines pre-operatively, portion/calorie-control, mindful eating and sources of protein.   3. Answered patients questions about  fluids from beverages and diet after surgery.   4. Patient demonstrates understanding.  Provided encouragement and support.   5. AVS via [employer]     Expected Engagement: good    GOALS:  Relating To Eating:  Eat slowly (20-30 minutes per meal), chewing foods well (25 chews per bite/applesauce consistency)  Focus on lean protein and non-starchy vegetables/whole fruit at each meal.  - Try recreating some of the meals that do not cause your reflux as bad.   - Try frozen vegetables   - Try having a vegetable at two meals daily.      Relating to beverages:  Reduce caffeine/carbonation/calorie containing beverages  Separate fluids from meals by 30 minutes before,  during, and after eating    Diet Guidelines after Weight-loss Surgery  [email or web address]     Your Stage 5 Diet: Regular Foods  [email or web address]    Portion Sizes after Weight Loss Surgery  [email or web address]    My Plate Planner_English - Pt Education  [email or web address]    Protein Sources for Weight Loss  [email or web address]     Carbohydrates  [email or web address]       Time spent with patient: 26  minutes.  Brittany Nieto, MS, RD, LD

## 2021-04-22 NOTE — PATIENT INSTRUCTIONS
GOALS:  Relating To Eating:  Eat slowly (20-30 minutes per meal), chewing foods well (25 chews per bite/applesauce consistency)  Focus on lean protein and non-starchy vegetables/whole fruit at each meal.  - Try recreating some of the meals that do not cause your reflux as bad.   - Try frozen vegetables   - Try having a vegetable at two meals daily.      Relating to beverages:  Reduce caffeine/carbonation/calorie containing beverages  Separate fluids from meals by 30 minutes before, during, and after eating    Diet Guidelines after Weight-loss Surgery  [email or web address]     Your Stage 5 Diet: Regular Foods  [email or web address]    Portion Sizes after Weight Loss Surgery  [email or web address]    My Plate Planner_English - Pt Education  [email or web address]    Protein Sources for Weight Loss  [email or web address]     Carbohydrates  [email or web address]

## 2021-05-05 NOTE — PROGRESS NOTES
"Minerva Finch is a 20 year old who is being evaluated via a billable video visit.      The patient has been notified of following:     \"This video visit will be conducted via a call between you and your physician/provider. We have found that certain health care needs can be provided without the need for an in-person physical exam.  This service lets us provide the care you need with a video conversation.  If a prescription is necessary we can send it directly to your pharmacy.  If lab work is needed we can place an order for that and you can then stop by our lab to have the test done at a later time.    Video visits are billed at different rates depending on your insurance coverage.  Please reach out to your insurance provider with any questions.    If during the course of the call the physician/provider feels a video visit is not appropriate, you will not be charged for this service.\"    Patient has given verbal consent for Video visit? Yes  How would you like to obtain your AVS? MyChart  Will anyone else be joining your video visit? No    Video-Visit Details    Type of service:  Video Visit    Video Start Time: 9:38 AM   Video End Time: 10:07 AM    Originating Location (pt. Location): Home    Distant Location (provider location):  [facility] [place]     Platform used for Video Visit: [facility] Bariatric Nutrition Consultation Note    Reason For Visit: Nutrition Assessment    Minerva Finch is a 20 year old presenting today for follow-up bariatric nutrition consult.   Pt is interested in RNYGB with Dr. Gallegos to help decrease long standing reflux that has been unresponsive with medication .  Patient is accompanied by self.  This is pt's 5th of 6 required nutrition visits prior to surgery.     Pt referred by Dr. Gallegos on January 21, 2021.  Patient with Co-morbidities of obesity including:  Type II DM no  Renal Failure no  Sleep apnea no  Hypertension no   Dyslipidemia " "no  Joint pain no  Back pain no  GERD yes     ANTHROPOMETRICS:  Estimated body mass index is 35.43 kg/m  as calculated from the following:    Height as of 1/21/21: 1.6 m (5' 3\").    Weight as of 1/21/21: 90.7 kg (200 lb).   Current Weight: 202 lb  ( per pt's report)     SUPPLEMENT INFORMATION:   Taking iron (ferrious gluconate)   - Plan on starting a MVI    NUTRITION HISTORY:  Per previous RD visit: Pt reports she has been dealing with reflux since she was little, in the past  1 1/2 years she reports the reflux has increase and now barley will keep food down. She will drink a protein shake (Equate) if she does not feel like eating, often this will sit better.   - She has changed her diet in hope of decreasing reflux. She does not eat spicy foods, tomato, carbonated beverages and no greasy foods.   - She reports she will eat her meal at a medium pace.       Over the past month she has not been able to focus on th goals as much as she would like, eating out more and not aware of her portion sizes or what she was eating. She is also eating more greasy foods like french fries as she feels she cannot have them after surgery.    She is trying to switch her sweet intake to a fruit     Pt reports her reflux depends on the day, somedays her body refuses to keep down foods.     Diet Recall:  B: Ensure max for breakfast 30 of protein   L : Shake for lunch: Herbal life product or sandwich and french fries  D: Varies, eating out     Progress Towards Previous Goals:   Relating To Eating:  Eat slowly (20-30 minutes per meal), chewing foods well (25 chews per bite/applesauce consistency)-Met,   Focus on lean protein and non-starchy vegetables/whole fruit at each meal.-No Change  - Try recreating some of the meals that do not cause your reflux as bad.   - Try frozen vegetables   - Try having a vegetable at two meals daily.      Relating to beverages:  Reduce caffeine/carbonation/calorie containing beverages-Drinking a lot of water " and protein shake, milk or almond   Separate fluids from meals by 30 minutes before, during, and after eating-Met      ADDITIONAL INFORMATION:  Works two part time jobs     NUTRITION DIAGNOSIS:  Obesity r/t long history of self-monitoring deficit and excessive energy intake aeb BMI >30 kg/m2.  And   Altered GI function r/t persistent reflux and decrease esophageal motility AEB pt report, EGD result and document persistent reflux.     INTERVENTION:  Intervention Provided/Education:   1. Reviewed and modified previous goals  2. Reviewed post-op diet guidelines, vitamins/minerals essential post-operatively, GI anatomy of bariatric surgeries, ways to help prepare for post-op diet guidelines pre-operatively, portion/calorie-control, mindful eating and sources of protein.   3. Answered patients questions about  fluids from beverages and diet after surgery.   4. Patient demonstrates understanding.  Provided encouragement and support.   5. AVS via [employer]     Expected Engagement: good    GOALS:  Relating To Eating:  Eat slowly (20-30 minutes per meal), chewing foods well (25 chews per bite/applesauce consistency)  Focus on lean protein and non-starchy vegetables/whole fruit at each meal.  - Try recreating some of the meals that do not cause your reflux as bad.   - Try frozen vegetables   - Try having a vegetable at two meals daily.      Relating to beverages:  Reduce caffeine/carbonation/calorie containing beverages  Separate fluids from meals by 30 minutes before, during, and after eating    Diet Guidelines after Weight-loss Surgery  [email or web address]     Your Stage 5 Diet: Regular Foods  [email or web address]    Portion Sizes after Weight Loss Surgery  [email or web address]    My Plate Planner_English - Pt Education  [email or web address]    Protein Sources for Weight Loss  [email or web address]     Carbohydrates  [email or web address]       Time  spent with patient: 29  minutes.  Brittany Nieto, MS, RD, LD

## 2021-05-06 ENCOUNTER — TELEPHONE (OUTPATIENT)
Dept: ENDOCRINOLOGY | Facility: CLINIC | Age: 21
End: 2021-05-06

## 2021-05-06 ENCOUNTER — VIRTUAL VISIT (OUTPATIENT)
Dept: ENDOCRINOLOGY | Facility: CLINIC | Age: 21
End: 2021-05-06
Payer: COMMERCIAL

## 2021-05-06 DIAGNOSIS — E66.9 OBESITY (BMI 30-39.9): ICD-10-CM

## 2021-05-06 DIAGNOSIS — K21.00 GASTROESOPHAGEAL REFLUX DISEASE WITH ESOPHAGITIS, UNSPECIFIED WHETHER HEMORRHAGE: Primary | ICD-10-CM

## 2021-05-06 DIAGNOSIS — Z71.3 NUTRITIONAL COUNSELING: ICD-10-CM

## 2021-05-06 PROCEDURE — 97803 MED NUTRITION INDIV SUBSEQ: CPT | Mod: 95 | Performed by: DIETITIAN, REGISTERED

## 2021-05-06 NOTE — LETTER
"5/6/2021       RE: Minerva Finch  [address] [facility]  [place] [address]     Dear Colleague,    Thank you for referring your patient, Minerva Finch, to the [facility] [place] at [facility]. Please see a copy of my visit note below.    Minerva Finch is a 20 year old who is being evaluated via a billable video visit.      The patient has been notified of following:     \"This video visit will be conducted via a call between you and your physician/provider. We have found that certain health care needs can be provided without the need for an in-person physical exam.  This service lets us provide the care you need with a video conversation.  If a prescription is necessary we can send it directly to your pharmacy.  If lab work is needed we can place an order for that and you can then stop by our lab to have the test done at a later time.    Video visits are billed at different rates depending on your insurance coverage.  Please reach out to your insurance provider with any questions.    If during the course of the call the physician/provider feels a video visit is not appropriate, you will not be charged for this service.\"    Patient has given verbal consent for Video visit? Yes  How would you like to obtain your AVS? MyChart  Will anyone else be joining your video visit? No    Video-Visit Details    Type of service:  Video Visit    Video Start Time: 9:38 AM   Video End Time: 10:07 AM    Originating Location (pt. Location): Home    Distant Location (provider location):  [facility] [place]     Platform used for Video Visit: [facility] Bariatric Nutrition Consultation Note    Reason For Visit: Nutrition Assessment    Minerva Finch is a 20 year old presenting today for follow-up bariatric nutrition consult.   Pt is interested in RNYGB with Dr. Gallegos to help decrease long standing reflux " "that has been unresponsive with medication .  Patient is accompanied by self.  This is pt's 5th of 6 required nutrition visits prior to surgery.     Pt referred by Dr. Gallegos on January 21, 2021.  Patient with Co-morbidities of obesity including:  Type II DM no  Renal Failure no  Sleep apnea no  Hypertension no   Dyslipidemia no  Joint pain no  Back pain no  GERD yes     ANTHROPOMETRICS:  Estimated body mass index is 35.43 kg/m  as calculated from the following:    Height as of 1/21/21: 1.6 m (5' 3\").    Weight as of 1/21/21: 90.7 kg (200 lb).   Current Weight: 202 lb  ( per pt's report)     SUPPLEMENT INFORMATION:   Taking iron (ferrious gluconate)   - Plan on starting a MVI    NUTRITION HISTORY:  Per previous RD visit: Pt reports she has been dealing with reflux since she was little, in the past  1 1/2 years she reports the reflux has increase and now barley will keep food down. She will drink a protein shake (Equate) if she does not feel like eating, often this will sit better.   - She has changed her diet in hope of decreasing reflux. She does not eat spicy foods, tomato, carbonated beverages and no greasy foods.   - She reports she will eat her meal at a medium pace.       Over the past month she has not been able to focus on th goals as much as she would like, eating out more and not aware of her portion sizes or what she was eating. She is also eating more greasy foods like french fries as she feels she cannot have them after surgery.    She is trying to switch her sweet intake to a fruit     Pt reports her reflux depends on the day, somedays her body refuses to keep down foods.     Diet Recall:  B: Ensure max for breakfast 30 of protein   L : Shake for lunch: Herbal life product or sandwich and french fries  D: Varies, eating out     Progress Towards Previous Goals:   Relating To Eating:  Eat slowly (20-30 minutes per meal), chewing foods well (25 chews per bite/applesauce consistency)-Met,   Focus on lean " protein and non-starchy vegetables/whole fruit at each meal.-No Change  - Try recreating some of the meals that do not cause your reflux as bad.   - Try frozen vegetables   - Try having a vegetable at two meals daily.      Relating to beverages:  Reduce caffeine/carbonation/calorie containing beverages-Drinking a lot of water and protein shake, milk or almond   Separate fluids from meals by 30 minutes before, during, and after eating-Met      ADDITIONAL INFORMATION:  Works two part time jobs     NUTRITION DIAGNOSIS:  Obesity r/t long history of self-monitoring deficit and excessive energy intake aeb BMI >30 kg/m2.  And   Altered GI function r/t persistent reflux and decrease esophageal motility AEB pt report, EGD result and document persistent reflux.     INTERVENTION:  Intervention Provided/Education:   1. Reviewed and modified previous goals  2. Reviewed post-op diet guidelines, vitamins/minerals essential post-operatively, GI anatomy of bariatric surgeries, ways to help prepare for post-op diet guidelines pre-operatively, portion/calorie-control, mindful eating and sources of protein.   3. Answered patients questions about  fluids from beverages and diet after surgery.   4. Patient demonstrates understanding.  Provided encouragement and support.   5. AVS via [employer]     Expected Engagement: good    GOALS:  Relating To Eating:  Eat slowly (20-30 minutes per meal), chewing foods well (25 chews per bite/applesauce consistency)  Focus on lean protein and non-starchy vegetables/whole fruit at each meal.  - Try recreating some of the meals that do not cause your reflux as bad.   - Try frozen vegetables   - Try having a vegetable at two meals daily.      Relating to beverages:  Reduce caffeine/carbonation/calorie containing beverages  Separate fluids from meals by 30 minutes before, during, and after eating    Diet Guidelines after Weight-loss Surgery  [email or web address]     Your Stage 5 Diet:  Regular Foods  [email or web address]    Portion Sizes after Weight Loss Surgery  [email or web address]    My Plate Planner_English - Pt Education  [email or web address]    Protein Sources for Weight Loss  [email or web address]     Carbohydrates  [email or web address]       Time spent with patient: 29  minutes.  Brittany Nieto, MS, RD, LD

## 2021-05-06 NOTE — TELEPHONE ENCOUNTER
Patient called back stating Huma [address] for clinic appointment with Dr. Gallegos won't work now due to her schedule. Scheduled May 27 at 8 a.m.

## 2021-05-07 NOTE — PATIENT INSTRUCTIONS
GOALS:  Relating To Eating:  Eat slowly (20-30 minutes per meal), chewing foods well (25 chews per bite/applesauce consistency)  Focus on lean protein and non-starchy vegetables/whole fruit at each meal.  - Try recreating some of the meals that do not cause your reflux as bad.   - Try frozen vegetables   - Try having a vegetable at two meals daily.      Relating to beverages:  Reduce caffeine/carbonation/calorie containing beverages  Separate fluids from meals by 30 minutes before, during, and after eating    Diet Guidelines after Weight-loss Surgery  [email or web address]     Your Stage 5 Diet: Regular Foods  [email or web address]    Portion Sizes after Weight Loss Surgery  [email or web address]    My Plate Planner_English - Pt Education  [email or web address]    Protein Sources for Weight Loss  [email or web address]     Carbohydrates  [email or web address]

## 2021-05-12 ENCOUNTER — CARE COORDINATION (OUTPATIENT)
Dept: ENDOCRINOLOGY | Facility: CLINIC | Age: 21
End: 2021-05-12

## 2021-05-12 DIAGNOSIS — D64.9 ANEMIA: Primary | ICD-10-CM

## 2021-05-12 DIAGNOSIS — E66.01 MORBID OBESITY (H): Primary | ICD-10-CM

## 2021-05-24 ASSESSMENT — ENCOUNTER SYMPTOMS
NAUSEA: 1
RECTAL PAIN: 0
ABDOMINAL PAIN: 1
HEARTBURN: 1
VOMITING: 1
BOWEL INCONTINENCE: 0
CONSTIPATION: 0
DIARRHEA: 0
JAUNDICE: 0
BLOOD IN STOOL: 0
BLOATING: 0

## 2021-05-27 ENCOUNTER — OFFICE VISIT (OUTPATIENT)
Dept: ENDOCRINOLOGY | Facility: CLINIC | Age: 21
End: 2021-05-27
Payer: COMMERCIAL

## 2021-05-27 VITALS
BODY MASS INDEX: 40.57 KG/M2 | OXYGEN SATURATION: 98 % | WEIGHT: 229 LBS | SYSTOLIC BLOOD PRESSURE: 133 MMHG | DIASTOLIC BLOOD PRESSURE: 64 MMHG | HEART RATE: 89 BPM | HEIGHT: 63 IN

## 2021-05-27 DIAGNOSIS — E66.01 MORBID OBESITY (H): Primary | ICD-10-CM

## 2021-05-27 PROCEDURE — 99214 OFFICE O/P EST MOD 30 MIN: CPT | Performed by: SURGERY

## 2021-05-27 ASSESSMENT — PAIN SCALES - GENERAL: PAINLEVEL: NO PAIN (0)

## 2021-05-27 ASSESSMENT — MIFFLIN-ST. JEOR: SCORE: 1777.87

## 2021-05-27 NOTE — LETTER
"5/27/2021       RE: Minerva Finch  [address] [facility] [address]     Dear Colleague,    Thank you for referring your patient, Minerva Finch, to the [facility] WEIGHT [facility]NAGEMENT [facility]INIC [place] at [facility]. Please see a copy of my visit note below.    Dear Dr. Tenorio,    I had the pleasure of meeting with Minerva to discuss Rashida-en-Y gastric bypass surgery as the best treatment for her very severe erosive esophagitis problem in conjunction with class three obesity.    Getting to this point has been somewhat circuitous because she had been in college in [place] last summer when I first met her. Our esophageal team fully considered her situation and our multidisciplinary team ultimately recommended gastric bypass as the best treatment to divert acid from her distal esophagus and this will at the same time treat class 3 obesity.    The patient has been unsuccessful with other methods of permanent weight loss and suffers from multiple weight related medical conditions.  Due to lack of success in achieving weight loss through other methods, she is interested in undergoing bariatric surgery.    Body mass index is 40.57 kg/m .      PREVIOUS WEIGHT LOSS ATTEMPTS:  Reviewed previously    CO-MORBIDITIES  Fatty liver, severe Gerd, severe long-standing erosive esophagitis    VITALS:  /64 (BP Location: Left arm, Patient Position: Sitting, Cuff Size: Adult Large)   Pulse 89   Ht 1.6 m (5' 3\")   Wt 103.9 kg (229 lb)   SpO2 98%   BMI 40.57 kg/m      PE:  GENERAL: Alert and oriented x3. NAD  HEENT exam: Sclerae not icteric. Hearing good. Head normocephalic and atraumatic.   CARDIOVASCULAR: No JVD  RESPIRATORY: Breathing unlabored  GASTROINTESTINAL: Obese  LOWER EXTREMITIES: no deformities  MUSCULOSKELETAL: Normal gait, Moves all 4 extremities equal and strong  NEUROLOGIC: no gross defect  SKIN: warm and dry to touch     In summary, she has " undergone an appropriate medical evaluation, dietitian evaluation, as well as psychologic screening. The patient appears to be an appropriate candidate for bariatric surgery.    In the office today, I discussed the laparoscopic gastric bypass surgery.  Risks, benefits and anticipated outcomes were outlined including the risk of death, staple line leak (1-2%), PE, DVT, ulcer, worsening GERD, N/V, stricture, hernia, wound infection, weight regain, and vitamin deficiencies. This patient has a good chance of sustaining permanent weight loss due to this procedure.  This should also allow improvement if not resolution of his/her weight related medical conditions.    At present we are going to present your patient's file for prior authorization to insurance.  Pending prior authorization, I anticipate a surgical date in the near future.  We will keep you updated on any progress.  Total time spent in the clinic and on chart review and documentation was 30 minutes with greater than 50% in face-to-face consultation.        Sincerely,    Sergo Gallegos MD    Please route or send letter to:  Primary Care Provider (PCP) and Referring Provider    Answers for HPI/ROS submitted by the patient on 5/24/2021   General Symptoms: No  Skin Symptoms: No  HENT Symptoms: No  EYE SYMPTOMS: No  HEART SYMPTOMS: No  LUNG SYMPTOMS: No  INTESTINAL SYMPTOMS: Yes  URINARY SYMPTOMS: No  GYNECOLOGIC SYMPTOMS: No  BREAST SYMPTOMS: No  SKELETAL SYMPTOMS: No  BLOOD SYMPTOMS: No  NERVOUS SYSTEM SYMPTOMS: No  MENTAL HEALTH SYMPTOMS: No  Heart burn or indigestion: Yes  Nausea: Yes  Vomiting: Yes  Abdominal pain: Yes  Bloating: No  Constipation: No  Diarrhea: No  Blood in stool: No  Black stools: No  Rectal or Anal pain: No  Fecal incontinence: No  Yellowing of skin or eyes: No  Vomit with blood: Yes  Change in stools: No

## 2021-05-27 NOTE — NURSING NOTE
"Chief Complaint   Patient presents with     RECHECK     Discuss RNY surg 7/27/21.       Vitals:    05/27/21 0752   BP: 133/64   BP Location: Left arm   Patient Position: Sitting   Cuff Size: Adult Large   Pulse: 89   SpO2: 98%   Weight: 103.9 kg (229 lb)   Height: 1.6 m (5' 3\")       Body mass index is 40.57 kg/m .                          Yvette Mireles EMT    "

## 2021-05-28 ENCOUNTER — TRANSFERRED RECORDS (OUTPATIENT)
Dept: HEALTH INFORMATION MANAGEMENT | Facility: CLINIC | Age: 21
End: 2021-05-28

## 2021-05-28 ENCOUNTER — MEDICAL CORRESPONDENCE (OUTPATIENT)
Dept: HEALTH INFORMATION MANAGEMENT | Facility: CLINIC | Age: 21
End: 2021-05-28

## 2021-06-01 ENCOUNTER — TELEPHONE (OUTPATIENT)
Dept: ENDOCRINOLOGY | Facility: CLINIC | Age: 21
End: 2021-06-01

## 2021-06-01 ENCOUNTER — CARE COORDINATION (OUTPATIENT)
Dept: ENDOCRINOLOGY | Facility: CLINIC | Age: 21
End: 2021-06-01

## 2021-06-01 DIAGNOSIS — D64.9 ANEMIA: Primary | ICD-10-CM

## 2021-06-01 DIAGNOSIS — Z01.818 PREOP TESTING: ICD-10-CM

## 2021-06-01 NOTE — TELEPHONE ENCOUNTER
Out side record letter from PETAR Calixto-JUDY [address] [address] Sent to Archive and scan into patient chart and Bariatric Team

## 2021-06-01 NOTE — PROGRESS NOTES
Tasklist updated and sent to patient via [employer].    Bariatric Task List  Status:  Is patient a candidate for bariatric surgery?:  patient is a candidate for bariatric surgery - Gastric bypass to treat severe reflux   Cleared to schedule surgeon consult?:  cleared to schedule surgeon consult - 8/6/20, 1/21/21, 5/27/21.   Status:  surgery evaluation in process -     Surgeon: Dr Sergo Gallegos -     Tentative surgery month/year: 7/27/21 -        Insurance: Insurance:  Blue Plus -        Patient Info: Initial Weight:  215 -     Date of Initial Weight/Height:  8/6/2020 -     Goal Weight (lbs):  205 -     Required Weight Loss:  10 -     Surgery Type:  gastric bypass - for severe reflux with hiatal hernia repair      Dietician Visits: Structured weight loss required by insurance?:  structured weight loss required -     Dietician Visit 1:  Completed - 1/21/21 in Epic. [facility]   Dietician Visit 2:  Completed - 2/10/21 in [facility]. [facility]   Dietician Visit 3:  Completed - 3/11/21 in Epic. [facility]   Dietician Visit 4:  Completed - 4/8/21 in Epic. [facility]   Dietician Visit 5:  Completed - 5/6/21 in Epic. [facility]   Dietician Visit 6:  Needed - 6/3/21 appt    Dietician Visit additional:  Needed - 7/1/21 appt. Monthly as needed prior to surgery for weight loss or postop diet teaching. Call [phone] to scheduled. [facility]      Psychological Evaluation: Psych eval:  Completed - 3/16/21 Dr Roxane Adrian - in Epic. [facility]      Lab Work: Complete Blood Count:  Needed - 6/24/21 - repeat needed. [facility]   Comprehensive Metabolic Panel:  Needed -     Vitamin D:  Needed -     Hgb A1c:  Needed -     PTH:  Needed -     Other:  Needed - lipids      Consults/ Clearance: Gastroenterology:  Completed - 11/10/20 Juni Callahan MD - clinic note   Hematology:  Needed -        PCP: Establish care with PCP:    -     Follow up with PCP:    -     PCP letter of support:  Completed - 5/28/21 ESTUARDO Mendoza - PCP ltr of support scanned, received via Right Fax on 6/1/21. [facility]  "     Patient Education:  Information Session:    -     Given \"Making your decision\" handout?:    -     Given support group information?:    -     Attended support group?:    -     Support plan in place?:    -        Additional Surgery Requirements: Other Requirements:  Covid test 4 days before surgery date. bks -     Other Requirements:  Contact Center to schedule postop appts for 1 week and 31 + days. bks -        Final Tasks:  Before surgery online class:  Needed -     Before surgery online class website link:  [email or web address]   After surgery online class:  Needed -     After surgery online class website link:  [email or web address]   Nurse visit for weigh-in and information:  Needed -     Pre-assessment clinic visit with anesthesia team for H&P:  Needed -     Final labs (Hgb, plt, T&S, UA):  Needed -        Notes: Please register for the Get Well Loop when you get an email invitation and a surgery date.     The Get Well Loop will give you information via email or text messages that can help you be more successful before and after surgery.  It can also help answer any questions you may have.    Get Well Loop Information  [email or web address]               "

## 2021-06-02 ENCOUNTER — CARE COORDINATION (OUTPATIENT)
Dept: ENDOCRINOLOGY | Facility: CLINIC | Age: 21
End: 2021-06-02

## 2021-06-02 NOTE — PROGRESS NOTES
"  Patient has established care with PETAR Mendoza, letter of support received via Right Fax and scanned into chart on 6/1/21.    On 5/14/21 Hematology recommended she work with PCP regarding anemia, treatment and getting labs done. Then to see hematology. (Which may be difficult with her going to school in [place] and having limited time in [place] prior to surgery).    Patient had a blood transfusion at [facility] in [facility] and got 2 units of blood. \"My PCP there, Jerri Miller, ordered it and was completed last Friday\" (5/30/21).    Minerva Finch  You 2 hours ago (11:21 AM)        The hematology team did not accept me. They told me that it would be best to see my PCP for my hemoglobin since I didn't have labs since last year            You  Minerva Finch 2 hours ago (11:19 AM)        Shad Palma,  Who did you see for the hematology clearance?  Thanks,  GAVIN Pelaez Lauren M 2 hours ago (11:19 AM)        Shad Palma,  It would help to have the baseline blood work done before you have your pre-assessment clinic with the anesthesia team.  I will let you know when I see results and I would love to have you send me the results you get in case they do not show up for me to see them.  Thanks,  GAVIN Pelaez       "

## 2021-06-03 ENCOUNTER — CARE COORDINATION (OUTPATIENT)
Dept: ENDOCRINOLOGY | Facility: CLINIC | Age: 21
End: 2021-06-03

## 2021-06-03 ENCOUNTER — VIRTUAL VISIT (OUTPATIENT)
Dept: ENDOCRINOLOGY | Facility: CLINIC | Age: 21
End: 2021-06-03
Payer: COMMERCIAL

## 2021-06-03 ENCOUNTER — TELEPHONE (OUTPATIENT)
Dept: ENDOCRINOLOGY | Facility: CLINIC | Age: 21
End: 2021-06-03

## 2021-06-03 DIAGNOSIS — Z71.3 NUTRITIONAL COUNSELING: Primary | ICD-10-CM

## 2021-06-03 DIAGNOSIS — E66.9 OBESITY (BMI 30-39.9): ICD-10-CM

## 2021-06-03 PROCEDURE — 97803 MED NUTRITION INDIV SUBSEQ: CPT | Mod: 95 | Performed by: DIETITIAN, REGISTERED

## 2021-06-03 NOTE — PATIENT INSTRUCTIONS
Shad Palma,    It was a pleasure to meet you today. Below are some of the things we talked about. I heard your frustrations and followed-up with the team. We are good to continue with surgery.    Dr. Gallegos will have you on a liquid diet 2 weeks before surgery to help prepare. We will disuss at our next appt.    Keep up the hard work!    Thank you,    BOB Logan-BABATUNDE, RD, LD    To schedule your follow-up appointment please call [phone].     Nutrition Goals  GOALS:  1. Incorporate more lean proteins    - try ground turkey    2. Discuss pre-surgery diet per Dr. Gallegos at next appt. He wants to do a liquid diet for 2 weeks prior to surgery.    3. Call Adán at [phone] for surgery pre-auth    4. Cont previous goals of working on  fluids from meals, chewing to applesauce consistency, etc.    5. Consider meal replacements, shakes, and bars. Listed below.       Diet Guidelines after Weight-loss Surgery  [email or web address]     Portion Sizes after Weight Loss Surgery  [email or web address]    My Plate Planner_English - Pt Education  [email or web address]    Protein Sources for Weight Loss  [email or web address]     Carbohydrates  [email or web address]     [facility] E-store:  You may purchase meal replacement products online at our e-store. Visit e-store at [email or web address]/store  - The one week starter kits is a great way to sample a variety of products.  - For recipes and ideas on how to use products, visit - GreenLancer     Meal Replacement Shake Options:   *Protein Shake Criteria: no more than 210 Calories, at least 20 grams of protein, and less than 10 grams of sugar      [facility] smoothie (160 Calories, 20 g protein)   Premier Protein (160 Calories, 30 g protein)  Slim Fast Advanced Nutrition (180 Calories, 20 g protein)  Muscle Milk, lactose-free, 17 oz bottle (210 Calories, 30 g protein)  Integrated Supplements, no  "artificial sugars (110 Calories, 20 g protein)  Genepro, unflavored protein powder (60 Calories, 30 g protein)  Boost/Ensure Max (160 calories, 30 gm protein)   Fairlife Core Power (170 calories, 26 gm protein)     Meal Replacement Bar Options:  Quest Protein Bars (190 Calories, 20 g protein)  Built Bar (170 Calories, 15-20 g protein)  One Protein Bar (210 calories, 20 g protein)  [place] Signature Protein Bar (Costco) (190 Calories, 21 g protein)  Pure Protein Bars (180 Calories, 21 g protein)     Frozen Meal Replacements  Healthy Choice  Lean Cuisine  Atkins Meals  Smart Ones     Healthy Recipe Resources:  \"The Volumetrics Eating Plan\" by Brianne Dave, Ph.D.  https://www.FX Bridge.Lessonwriter/  www.Netsize  www.Inxero.org  Dash Diet Recipes [facility]  www.extension.[facility] - the recipe box  \"Cooking that Counts\" by editors of iPeen  https://www.[facility]/communityculinary/[facility]_Cookbook_KT_Final_11-6_NoCrops-compressed.pdf  Https://www.choosemyplate.gov/myplatekitchen  https://snaped.fns.usda.gov/recipes-menus - SNAP recipes  https://www.diabetesfoodhub.org/all-recipes.html  https://www.Parko.com/  "

## 2021-06-03 NOTE — PROGRESS NOTES
I talked with Dr. Gallegos about L.O.  His is okay continuing with her surgery as planned.  He wants her on liquids for the 2 weeks prior to surgery.  He wants her on more focused on dietitian visits - 1 wk, 1mo, 2 mo, 3 mo and annually.    Patient called and informed of above plan.  Sees Nolvia Gonzalez RD on 7/1/21.  -To discuss the liquids for 2 weeks prior to surgery.  -To schedule the following postop appts:   1 wk, 1mo, 2 mo, 3 mo and annually.

## 2021-06-03 NOTE — TELEPHONE ENCOUNTER
6/3 talked to pt, but busy, so she'll call back.  For this staff msg/post ops..   (btw, 7= 8/3, 31+=8/27, then 9/27, 10/27)    Minerva Finch is having a sleeve gastrectomy with   Dr Gallegos on 7/27/21.       Please call to schedule:     __To schedule the following postop appts with Nolvia Gonzalez RD:    1 wk, 31+ days, 2 mo and 3 mo.     __Post-op appointments for 1 week and 31+ days after surgery with a provider.   Please schedule the 1 week provider appointment as an in-person visit with Kaya Richmond CNP or Aida Zacarias PA-C.   If they request a video visit (due to distance), they must have a weight check and vital signs checked at their PCP clinic and faxed to us at [phone] prior to the visit.

## 2021-06-03 NOTE — LETTER
"6/3/2021       RE: Minerva Finch  [address]   [place] [address]     Dear Colleague,    Thank you for referring your patient, Minerva Finch, to the [facility] WEIGHT MANAGEMENT CLINIC [place] at [facility]. Please see a copy of my visit note below.    Minerva Finch is a 20 year old who is being evaluated via a billable video visit.      The patient has been notified of following:     \"This video visit will be conducted via a call between you and your physician/provider. We have found that certain health care needs can be provided without the need for an in-person physical exam.  This service lets us provide the care you need with a video conversation.  If a prescription is necessary we can send it directly to your pharmacy.  If lab work is needed we can place an order for that and you can then stop by our lab to have the test done at a later time.    Video visits are billed at different rates depending on your insurance coverage.  Please reach out to your insurance provider with any questions.    If during the course of the call the physician/provider feels a video visit is not appropriate, you will not be charged for this service.\"    Patient has given verbal consent for Video visit? Yes  How would you like to obtain your AVS? MyChart  Will anyone else be joining your video visit? Bf    Video-Visit Details    Type of service:  Video Visit    Video Start Time: 10:00 am  Video End Time: 10:31 am    Originating Location (pt. Location): Home    Distant Location (provider location):  [facility] WEIGHT MANAGEMENT CLINIC [place]     Platform used for Video Visit: [facility] B[facility]ation [facility]    Reason For Visit: Nutrition Assessment    Minerva Finch is a 20 year old presenting today for follow-up bariatric nutrition consult.   Pt is interested in RNYGB with Dr. Gallegos to help decrease long standing reflux " "that has been unresponsive with medication .This is pt's 6th of 6 required nutrition visits prior to surgery.     Pt referred by Dr. Gallegos on January 21, 2021.    Patient with Co-morbidities of obesity including:  Type II DM no  Renal Failure no  Sleep apnea no  Hypertension no   Dyslipidemia no  Joint pain no  Back pain no  GERD yes     ANTHROPOMETRICS:  Initial weight: 215 lbs    Estimated body mass index is 40.57 kg/m  as calculated from the following:    Height as of 5/27/21: 1.6 m (5' 3\").    Weight as of 5/27/21: 103.9 kg (229 lb).     Weight loss required for surgery: 10 lbs (205 lbs)      SUPPLEMENT INFORMATION:   Taking iron (ferrious gluconate)   Daily MVI (vitafusion) gummie     Had a blood transfusion last week.     NUTRITION HISTORY:  Per previous RD visit: Pt reports she has been dealing with reflux since she was little, in the past  1 1/2 years she reports the reflux has increase and now barley will keep food down. She will drink a protein shake (Equate) if she does not feel like eating, often this will sit better.   - She has changed her diet in hope of decreasing reflux. She does not eat spicy foods, tomato, carbonated beverages and no greasy foods.   - She reports she will eat her meal at a medium pace.     Over the past month she has not been able to focus on the goals as much as she would like, eating out more and not aware of her portion sizes or what she was eating. She is also eating more greasy foods like french fries as she feels she cannot have them after surgery.    She is trying to switch her sweet intake to a fruit     Pt reports her reflux depends on the day, somedays her body refuses to keep down foods.     Diet Recall:  B: Ensure max for breakfast 30 of protein   L : Shake for lunch: Herbal life product or sandwich and french fries  D: Varies, eating out     Today:  Breakfast: protein shake, maybe some trail mix  Lunch: chicken salad or sandwich maybe a hamburger  Dinner: pork chop, " asparagus, and maybe potato   Beverages: lots of water (not sure how much)    Progress Towards Previous Goals:   Relating To Eating:    Eat slowly (20-30 minutes per meal), chewing foods well (25 chews per bite/applesauce consistency)    Focus on lean protein and non-starchy vegetables/whole fruit at each meal.  - Try recreating some of the meals that do not cause your reflux as bad.   - Try frozen vegetables   - Try having a vegetable at two meals daily.    - Met/improving, eating lots of chicken/protein shakes, pork tenderloin     Relating to beverages:  Reduce caffeine/carbonation/calorie containing beverages  Separate fluids from meals by 30 minutes before, during, and after eating      ADDITIONAL INFORMATION:  Works two part time jobs     NUTRITION DIAGNOSIS:  Obesity r/t long history of self-monitoring deficit and excessive energy intake aeb BMI >30 kg/m2.  And   Altered GI function r/t persistent reflux and decrease esophageal motility AEB pt report, EGD result and document persistent reflux.     INTERVENTION:  Intervention Provided/Education:   1. Reviewed and modified previous goals  2. Reviewed post-op diet guidelines, vitamins/minerals essential post-operatively, GI anatomy of bariatric surgeries, ways to help prepare for post-op diet guidelines pre-operatively, portion/calorie-control, mindful eating and sources of protein.   3. Answered patients questions   4. Patient instructions via PacerProt Message  5. Coordination of care with team.    Expected Engagement: good    GOALS:  1. Incorporate more lean proteins    - try ground turkey    2. Discuss pre-surgery diet per Dr. Gallegos at next appt. He wants to do a liquid diet for 2 weeks prior to surgery.    3. Call Adán at [phone] for surgery pre-auth    4. Cont previous goals of working on  fluids from meals, chewing to applesauce consistency, etc.    5. Consider meal replacements, shakes, and bars. Listed below.       Diet Guidelines after  "Weight-loss Surgery  [email or web address]     Portion Sizes after Weight Loss Surgery  [email or web address]    My Plate Planner_English - Pt Education  [email or web address]    Protein Sources for Weight Loss  [email or web address]     Carbohydrates  [email or web address]     [facility] E-store:  You may purchase meal replacement products online at our e-store. Visit e-store at https://Dynatherm Medical/store  - The one week starter kits is a great way to sample a variety of products.  - For recipes and ideas on how to use products, visit - NOBLE PEAK VISION     Meal Replacement Shake Options:   *Protein Shake Criteria: no more than 210 Calories, at least 20 grams of protein, and less than 10 grams of sugar      [facility] smoothie (160 Calories, 20 g protein)   Premier Protein (160 Calories, 30 g protein)  Slim Fast Advanced Nutrition (180 Calories, 20 g protein)  Muscle Milk, lactose-free, 17 oz bottle (210 Calories, 30 g protein)  Integrated Supplements, no artificial sugars (110 Calories, 20 g protein)  Genepro, unflavored protein powder (60 Calories, 30 g protein)  Boost/Ensure Max (160 calories, 30 gm protein)   [facility] Core Power (170 calories, 26 gm protein)     Meal Replacement Bar Options:  Quest Protein Bars (190 Calories, 20 g protein)  Built Bar (170 Calories, 15-20 g protein)  One Protein Bar (210 calories, 20 g protein)  [place] Signature Protein Bar (Costco) (190 Calories, 21 g protein)  Pure Protein Bars (180 Calories, 21 g protein)     Frozen Meal Replacements  Healthy Choice  Lean Cuisine  Atkins Meals  Smart Ones     Healthy Recipe Resources:  \"The Volumetrics Eating Plan\" by Brianne Dave, Ph.D.  https://www.TransferGotive.Y'all/  www.cWyze.Y'all  www.oldwayspt.org  Dash Diet Recipes [facility]  www.extension.[facility] - the recipe box  \"Cooking that Counts\" by editors of " CookingLight  https://www.[facility]/communityculin[place]ford/[facility]_Cookbook_KT_Final_11-6_NoCrops-compressed.pdf  Https://www.choosemyplate.gov/myplatekitchen  https://snaped.fns.usda.gov/recipes-menus - SNAP recipes  https://www.diabetesfoodhub.org/all-recipes.html  https://www.mealime.com/      Time spent with patient: 31  minutes.  GLYNN Logan, RD, LD

## 2021-06-03 NOTE — PROGRESS NOTES
"Minerva Finch is a 20 year old who is being evaluated via a billable video visit.      The patient has been notified of following:     \"This video visit will be conducted via a call between you and your physician/provider. We have found that certain health care needs can be provided without the need for an in-person physical exam.  This service lets us provide the care you need with a video conversation.  If a prescription is necessary we can send it directly to your pharmacy.  If lab work is needed we can place an order for that and you can then stop by our lab to have the test done at a later time.    Video visits are billed at different rates depending on your insurance coverage.  Please reach out to your insurance provider with any questions.    If during the course of the call the physician/provider feels a video visit is not appropriate, you will not be charged for this service.\"    Patient has given verbal consent for Video visit? Yes  How would you like to obtain your AVS? MyChart  Will anyone else be joining your video visit? Bf    Video-Visit Details    Type of service:  Video Visit    Video Start Time: 10:00 am  Video End Time: 10:31 am    Originating Location (pt. Location): Home    Distant Location (provider location):  [facility]NAG[facility] [place]     Platform used for Video Visit: [facility] Bariatric Nutrition Consultation Note    Reason For Visit: Nutrition Assessment    Minerva Finch is a 20 year old presenting today for follow-up bariatric nutrition consult.   Pt is interested in RNYGB with Dr. Gallegos to help decrease long standing reflux that has been unresponsive with medication .This is pt's 6th of 6 required nutrition visits prior to surgery.     Pt referred by Dr. Gallegos on January 21, 2021.    Patient with Co-morbidities of obesity including:  Type II DM no  Renal Failure no  Sleep apnea no  Hypertension no   Dyslipidemia no  Joint pain no  Back pain " "no  GERD yes     ANTHROPOMETRICS:  Initial weight: 215 lbs    Estimated body mass index is 40.57 kg/m  as calculated from the following:    Height as of 5/27/21: 1.6 m (5' 3\").    Weight as of 5/27/21: 103.9 kg (229 lb).     Weight loss required for surgery: 10 lbs (205 lbs)      SUPPLEMENT INFORMATION:   Taking iron (ferrious gluconate)   Daily MVI (vitafusion) gummie     Had a blood transfusion last week.     NUTRITION HISTORY:  Per previous RD visit: Pt reports she has been dealing with reflux since she was little, in the past  1 1/2 years she reports the reflux has increase and now barley will keep food down. She will drink a protein shake (Equate) if she does not feel like eating, often this will sit better.   - She has changed her diet in hope of decreasing reflux. She does not eat spicy foods, tomato, carbonated beverages and no greasy foods.   - She reports she will eat her meal at a medium pace.     Over the past month she has not been able to focus on the goals as much as she would like, eating out more and not aware of her portion sizes or what she was eating. She is also eating more greasy foods like french fries as she feels she cannot have them after surgery.    She is trying to switch her sweet intake to a fruit     Pt reports her reflux depends on the day, somedays her body refuses to keep down foods.     Diet Recall:  B: Ensure max for breakfast 30 of protein   L : Shake for lunch: Herbal life product or sandwich and french fries  D: Varies, eating out     Today:  Breakfast: protein shake, maybe some trail mix  Lunch: chicken salad or sandwich maybe a hamburger  Dinner: pork chop, asparagus, and maybe potato   Beverages: lots of water (not sure how much)    Progress Towards Previous Goals:   Relating To Eating:    Eat slowly (20-30 minutes per meal), chewing foods well (25 chews per bite/applesauce consistency)    Focus on lean protein and non-starchy vegetables/whole fruit at each meal.  - Try " recreating some of the meals that do not cause your reflux as bad.   - Try frozen vegetables   - Try having a vegetable at two meals daily.    - Met/improving, eating lots of chicken/protein shakes, pork tenderloin     Relating to beverages:  Reduce caffeine/carbonation/calorie containing beverages  Separate fluids from meals by 30 minutes before, during, and after eating      ADDITIONAL INFORMATION:  Works two part time jobs     NUTRITION DIAGNOSIS:  Obesity r/t long history of self-monitoring deficit and excessive energy intake aeb BMI >30 kg/m2.  And   Altered GI function r/t persistent reflux and decrease esophageal motility AEB pt report, EGD result and document persistent reflux.     INTERVENTION:  Intervention Provided/Education:   1. Reviewed and modified previous goals  2. Reviewed post-op diet guidelines, vitamins/minerals essential post-operatively, GI anatomy of bariatric surgeries, ways to help prepare for post-op diet guidelines pre-operatively, portion/calorie-control, mindful eating and sources of protein.   3. Answered patients questions   4. Patient instructions via [employer] Message  5. Coordination of care with team.    Expected Engagement: good    GOALS:  1. Incorporate more lean proteins    - try ground turkey    2. Discuss pre-surgery diet per Dr. Gallegos at next appt. He wants to do a liquid diet for 2 weeks prior to surgery.    3. Call Adán at [phone] for surgery pre-auth    4. Cont previous goals of working on  fluids from meals, chewing to applesauce consistency, etc.    5. Consider meal replacements, shakes, and bars. Listed below.       Diet Guidelines after Weight-loss Surgery  [email or web address]     Portion Sizes after Weight Loss Surgery  [email or web address]    My Plate Planner_English - Pt Education  [email or web address]    Protein Sources for Weight Loss  [email or web address]  "    Carbohydrates  [email or web address]     [employer] [place] E-store:  You may purchase meal replacement products online at our e-store. Visit e-store at https://[facility].ivWatch/store  - The one week starter kits is a great way to sample a variety of products.  - For recipes and ideas on how to use products, visit - Changelight     Meal Replacement Shake Options:   *Protein Shake Criteria: no more than 210 Calories, at least 20 grams of protein, and less than 10 grams of sugar      [facility] smoothie (160 Calories, 20 g protein)   Premier Protein (160 Calories, 30 g protein)  Slim Fast Advanced Nutrition (180 Calories, 20 g protein)  Muscle Milk, lactose-free, 17 oz bottle (210 Calories, 30 g protein)  Integrated Supplements, no artificial sugars (110 Calories, 20 g protein)  Genepro, unflavored protein powder (60 Calories, 30 g protein)  Boost/Ensure Max (160 calories, 30 gm protein)   [facility] Core Power (170 calories, 26 gm protein)     Meal Replacement Bar Options:  Quest Protein Bars (190 Calories, 20 g protein)  Built Bar (170 Calories, 15-20 g protein)  One Protein Bar (210 calories, 20 g protein)  [place] Signature Protein Bar ([employer]) (190 Calories, 21 g protein)  Pure Protein Bars (180 Calories, 21 g protein)     Frozen Meal Replacements  Healthy Choice  Lean Cuisine  Atkins Meals  Smart Ones     Healthy Recipe Resources:  \"The Volumetrics Eating Plan\" by Brianne Dave, Ph.D.  https://www.[employer].SquaredOut/  www.bizk.it  www.oldTonchidotpt.org  Dash Diet Recipes [facility]  www.extension.[facility] - the recipe box  \"Cooking that Counts\" by editors of CookingLight  https://www.[facility]/communityculinary/[facility]_Cookbook_KT_Final_11-6_NoCrops-compressed.pdf  Https://www.choosemyplate.gov/myplatekitchen  https://snaped.fns.usda.gov/recipes-menus - SNAP recipes  https://www.diabetesfoodhub.org/all-recipes.html  https://www.DuckDuckGo.com/      Time spent with patient: 31  minutes.  Nolvia" GLYNN Gonzalez, ROSALIE, GURPREET

## 2021-06-04 NOTE — PROGRESS NOTES
Reviewed with patient over the phone.  Bariatric Task List  Status:  Is patient a candidate for bariatric surgery?:  patient is a candidate for bariatric surgery - Gastric bypass to treat severe reflux   Cleared to schedule surgeon consult?:  cleared to schedule surgeon consult - 8/6/20, 1/21/21, 5/27/21.   Status:  surgery evaluation in process -     Surgeon: Dr Sergo Gallegos -     Tentative surgery month/year: 7/27/21 -        Insurance: Insurance:  Blue Plus -        Patient Info: Initial Weight:  215 -     Date of Initial Weight/Height:  8/6/2020 -     Goal Weight (lbs):  205 -     Required Weight Loss:  10 -     Surgery Type:  gastric bypass - for severe reflux      Dietician Visits: Structured weight loss required by insurance?:  structured weight loss required -     Dietician Visit 1:  Completed - 1/21/21 in Epic. [facility]   Dietician Visit 2:  Completed - 2/10/21 in [facility]. [facility]   Dietician Visit 3:  Completed - 3/11/21 in Epic. [facility]   Dietician Visit 4:  Completed - 4/8/21 in Epic. [facility]   Dietician Visit 5:  Completed - 5/6/21 in Epic. [facility]   Dietician Visit 6:  Completed - 6/3/21 appt    Dietician Visit additional:  Needed - 7/1/21 appt. Monthly as needed prior to surgery for weight loss or postop diet teaching. Call [phone] to scheduled. [facility]      Psychological Evaluation: Psych eval:  Completed - 3/16/21 Dr Roxane Adrian - in Epic. [facility]      Lab Work: Complete Blood Count:  Needed - 6/24/21 - repeat needed. [facility]   Comprehensive Metabolic Panel:  Needed -     Vitamin D:  Needed -     Hgb A1c:  Needed -     PTH:  Needed -     Other:  Needed - lipids      Consults/ Clearance: Gastroenterology:  Completed - 11/10/20 Juni Callahan MD - clinic note   Vascular Medicine:    -     Hematology:  Needed -        PCP: Establish care with PCP:    -     Follow up with PCP:    -     PCP letter of support:  Completed - 5/28/21 ESTUARDO Mendoza - PCP ltr of support scanned, received via Right Fax on 6/1/21. [facility]     "  Smoking: Quit tobacco use (3 months smoke free)?:    -     Quit date:    -        Patient Education:  Information Session:    -     Given \"Making your decision\" handout?:    -     Given support group information?:    -     Attended support group?:    -     Support plan in place?:    -     Research consents signed?:    -        Additional Surgery Requirements: Other Requirements:  Covid test 4 days before surgery date. bks -     Other Requirements:  Contact Center to schedule postop appts for 1 week and 31 + days. bks -        Final Tasks:  Before surgery online class:  Needed -     Before surgery online class website link:  [email or web address]   After surgery online class:  Needed -     After surgery online class website link:  [email or web address]   Nurse visit for weigh-in and information:  Needed -     Pre-assessment clinic visit with anesthesia team for H&P:  Needed -     Final labs (Hgb, plt, T&S, UA):  Needed -        Notes: Please register for the Get Well Loop when you get an email invitation and a surgery date.     The Get Well Loop will give you information via email or text messages that can help you be more successful before and after surgery.  It can also help answer any questions you may have.    Get Well Loop Information  [email or web address]       -             "

## 2021-06-09 ENCOUNTER — PREP FOR PROCEDURE (OUTPATIENT)
Dept: ENDOCRINOLOGY | Facility: CLINIC | Age: 21
End: 2021-06-09

## 2021-06-09 DIAGNOSIS — E66.01 MORBID OBESITY (H): Primary | ICD-10-CM

## 2021-06-09 DIAGNOSIS — Z01.818 PREOP TESTING: ICD-10-CM

## 2021-06-09 DIAGNOSIS — K21.9 GASTRIC REFLUX SYNDROME: ICD-10-CM

## 2021-06-09 RX ORDER — CEFAZOLIN SODIUM 1 G/50ML
1 INJECTION, SOLUTION INTRAVENOUS SEE ADMIN INSTRUCTIONS
Status: CANCELLED | OUTPATIENT
Start: 2021-06-09

## 2021-06-09 RX ORDER — CEFAZOLIN SODIUM 2 G/50ML
2 SOLUTION INTRAVENOUS
Status: CANCELLED | OUTPATIENT
Start: 2021-06-09

## 2021-06-10 PROBLEM — K21.9 GASTRIC REFLUX SYNDROME: Status: ACTIVE | Noted: 2021-06-10

## 2021-06-10 PROBLEM — E66.01 MORBID OBESITY (H): Status: ACTIVE | Noted: 2021-06-10

## 2021-06-10 NOTE — TELEPHONE ENCOUNTER
FUTURE VISIT INFORMATION      SURGERY INFORMATION:    Date: Pre-op, DOS 7/27 gastric bypass with Dr. Gallegos appt per patient    Location: TBD    Surgeon:  Sergo Gallegos MD    Anesthesia Type:  TBD     Procedure: gastric bypass     Consult: 8/6/2020    RECORDS REQUESTED FROM:       Primary Care Provider: TOMMY Schilling MD ([facility]  )    Pertinent Medical History: Fatty Liver

## 2021-06-11 ENCOUNTER — TELEPHONE (OUTPATIENT)
Dept: ENDOCRINOLOGY | Facility: CLINIC | Age: 21
End: 2021-06-11

## 2021-06-11 NOTE — TELEPHONE ENCOUNTER
Discussed with Dr. Gallegos the amount of time he would need for Rashida-en-Y gastric bypass and hiatal hernia repair. He said to add no time for hiatal hernia repair but that the bypass would need 1-3/4 hours of OR time due to equipment needed. OR  notified of his comments.

## 2021-06-14 ENCOUNTER — TRANSFERRED RECORDS (OUTPATIENT)
Dept: HEALTH INFORMATION MANAGEMENT | Facility: CLINIC | Age: 21
End: 2021-06-14

## 2021-06-14 LAB
ALT SERPL-CCNC: 18 U/L (ref 6–29)
AST SERPL-CCNC: 17 U/L (ref 10–30)
CHOLESTEROL (EXTERNAL): 124 MG/DL
CREATININE (EXTERNAL): 0.67 MG/DL (ref 0.5–1.1)
GFR ESTIMATED (EXTERNAL): 127 ML/MIN/1.73M2
GFR ESTIMATED (IF AFRICAN AMERICAN) (EXTERNAL): 147 ML/MIN/1.73M2
GLUCOSE (EXTERNAL): 91 MG/DL (ref 65–99)
HBA1C MFR BLD: 4.9 %
HDLC SERPL-MCNC: 41 MG/DL
LDL CHOLESTEROL (EXTERNAL): 67 MG/DL
NON HDL CHOLESTEROL (EXTERNAL): 83 MG/DL
POTASSIUM (EXTERNAL): 4.2 MMOL/L (ref 3.5–5.3)
TRIGLYCERIDES (EXTERNAL): 77 MG/DL

## 2021-06-21 NOTE — PROGRESS NOTES
"Dear Dr. Tenorio,    I had the pleasure of meeting with Minerva to discuss Rashida-en-Y gastric bypass surgery as the best treatment for her very severe erosive esophagitis problem in conjunction with class three obesity.    Getting to this point has been somewhat circuitous because she had been in college in [place] last summer when I first met her. Our esophageal team fully considered her situation and our multidisciplinary team ultimately recommended gastric bypass as the best treatment to divert acid from her distal esophagus and this will at the same time treat class 3 obesity.    The patient has been unsuccessful with other methods of permanent weight loss and suffers from multiple weight related medical conditions.  Due to lack of success in achieving weight loss through other methods, she is interested in undergoing bariatric surgery.    Body mass index is 40.57 kg/m .      PREVIOUS WEIGHT LOSS ATTEMPTS:  Reviewed previously    CO-MORBIDITIES  Fatty liver, severe Gerd, severe long-standing erosive esophagitis    VITALS:  /64 (BP Location: Left arm, Patient Position: Sitting, Cuff Size: Adult Large)   Pulse 89   Ht 1.6 m (5' 3\")   Wt 103.9 kg (229 lb)   SpO2 98%   BMI 40.57 kg/m      PE:  GENERAL: Alert and oriented x3. NAD  HEENT exam: Sclerae not icteric. Hearing good. Head normocephalic and atraumatic.   CARDIOVASCULAR: No JVD  RESPIRATORY: Breathing unlabored  GASTROINTESTINAL: Obese  LOWER EXTREMITIES: no deformities  MUSCULOSKELETAL: Normal gait, Moves all 4 extremities equal and strong  NEUROLOGIC: no gross defect  SKIN: warm and dry to touch     In summary, she has undergone an appropriate medical evaluation, dietitian evaluation, as well as psychologic screening. The patient appears to be an appropriate candidate for bariatric surgery.    In the office today, I discussed the laparoscopic gastric bypass surgery.  Risks, benefits and anticipated outcomes were outlined including the risk of death, " staple line leak (1-2%), PE, DVT, ulcer, worsening GERD, N/V, stricture, hernia, wound infection, weight regain, and vitamin deficiencies. This patient has a good chance of sustaining permanent weight loss due to this procedure.  This should also allow improvement if not resolution of his/her weight related medical conditions.    At present we are going to present your patient's file for prior authorization to insurance.  Pending prior authorization, I anticipate a surgical date in the near future.  We will keep you updated on any progress.  Total time spent in the clinic and on chart review and documentation was 30 minutes with greater than 50% in face-to-face consultation.        Sincerely,    Sergo Gallegos MD    Please route or send letter to:  Primary Care Provider (PCP) and Referring Provider    Answers for HPI/ROS submitted by the patient on 5/24/2021   General Symptoms: No  Skin Symptoms: No  HENT Symptoms: No  EYE SYMPTOMS: No  HEART SYMPTOMS: No  LUNG SYMPTOMS: No  INTESTINAL SYMPTOMS: Yes  URINARY SYMPTOMS: No  GYNECOLOGIC SYMPTOMS: No  BREAST SYMPTOMS: No  SKELETAL SYMPTOMS: No  BLOOD SYMPTOMS: No  NERVOUS SYSTEM SYMPTOMS: No  MENTAL HEALTH SYMPTOMS: No  Heart burn or indigestion: Yes  Nausea: Yes  Vomiting: Yes  Abdominal pain: Yes  Bloating: No  Constipation: No  Diarrhea: No  Blood in stool: No  Black stools: No  Rectal or Anal pain: No  Fecal incontinence: No  Yellowing of skin or eyes: No  Vomit with blood: Yes  Change in stools: No

## 2021-06-24 ENCOUNTER — MYC MEDICAL ADVICE (OUTPATIENT)
Dept: ENDOCRINOLOGY | Facility: CLINIC | Age: 21
End: 2021-06-24

## 2021-06-28 ENCOUNTER — TELEPHONE (OUTPATIENT)
Dept: ENDOCRINOLOGY | Facility: CLINIC | Age: 21
End: 2021-06-28

## 2021-06-28 NOTE — TELEPHONE ENCOUNTER
Faxed Appeal Request Form to [facility] today to [phone], sleeve denied based on BMI under 40. Clinicals and letter attached.

## 2021-07-01 ENCOUNTER — VIRTUAL VISIT (OUTPATIENT)
Dept: ENDOCRINOLOGY | Facility: CLINIC | Age: 21
End: 2021-07-01
Payer: COMMERCIAL

## 2021-07-01 DIAGNOSIS — Z71.3 NUTRITIONAL COUNSELING: Primary | ICD-10-CM

## 2021-07-01 DIAGNOSIS — K21.9 GASTRIC REFLUX SYNDROME: ICD-10-CM

## 2021-07-01 DIAGNOSIS — E66.9 OBESITY (BMI 30-39.9): ICD-10-CM

## 2021-07-01 PROCEDURE — 97803 MED NUTRITION INDIV SUBSEQ: CPT | Mod: 95 | Performed by: DIETITIAN, REGISTERED

## 2021-07-01 NOTE — PROGRESS NOTES
"Minerva Finch is a 20 year old who is being evaluated via a billable video visit.      The patient has been notified of following:     \"This video visit will be conducted via a call between you and your physician/provider. We have found that certain health care needs can be provided without the need for an in-person physical exam.  This service lets us provide the care you need with a video conversation.  If a prescription is necessary we can send it directly to your pharmacy.  If lab work is needed we can place an order for that and you can then stop by our lab to have the test done at a later time.    Video visits are billed at different rates depending on your insurance coverage.  Please reach out to your insurance provider with any questions.    If during the course of the call the physician/provider feels a video visit is not appropriate, you will not be charged for this service.\"    Patient has given verbal consent for Video visit? Yes  How would you like to obtain your AVS? MyChart  Will anyone else be joining your video visit? Bf    Video-Visit Details    Type of service:  Video Visit    Video Start Time: 9:38 am  Video End Time: 10:29 am    Originating Location (pt. Location): Home    Distant Location (provider location):  [facility]NAGEMENT CL[facility] [place]     Platform used for Video Visit: [facility] Bariatric Nutrition Consultation Note    Reason For Visit: Nutrition Assessment    Minerva Finch is a 20 year old presenting today for follow-up bariatric nutrition consult and education on clear liquid and full liquid diets.  Pt is interested in RNYGB with Dr. Gallegos to help decrease long standing reflux that has been unresponsive with medication .This is pt's 7th of 6 required nutrition visits prior to surgery.     Pt referred by Dr. Gallegos on January 21, 2021.    Patient with Co-morbidities of obesity including:  Type II DM no  Renal Failure no  Sleep apnea no  Hypertension no " "  Dyslipidemia no  Joint pain no  Back pain no  GERD yes     ANTHROPOMETRICS:  Initial weight: 215 lbs    Estimated body mass index is 40.57 kg/m  as calculated from the following:    Height as of 5/27/21: 1.6 m (5' 3\").    Weight as of 5/27/21: 103.9 kg (229 lb).     Current weight: 222 lbs per pt     Weight loss required for surgery: 10 lbs (205 lbs)      SUPPLEMENT INFORMATION:   Taking iron (ferrious gluconate)   Daily MVI (vitafusion) gummie     Recent blood transfusion to help with iron status    Got new MVI  One a day women's - 18 mcg iron  Not chewable - large pills. Will try to find a new one     NUTRITION HISTORY:  Per previous RD visit: Pt reports she has been dealing with reflux since she was little, in the past  1 1/2 years she reports the reflux has increase and now barley will keep food down. She will drink a protein shake (Equate) if she does not feel like eating, often this will sit better.   - She has changed her diet in hope of decreasing reflux. She does not eat spicy foods, tomato, carbonated beverages and no greasy foods.   - She reports she will eat her meal at a medium pace.     Over the past month she has not been able to focus on the goals as much as she would like, eating out more and not aware of her portion sizes or what she was eating. She is also eating more greasy foods like french fries as she feels she cannot have them after surgery.    She is trying to switch her sweet intake to a fruit     Pt reports her reflux depends on the day, somedays her body refuses to keep down foods.     Diet Recall:  B: Ensure max for breakfast 30 of protein   L : Shake for lunch: Herbal life product or sandwich and french fries  D: Varies, eating out     Breakfast: protein shake, maybe some trail mix  Lunch: chicken salad or sandwich maybe a hamburger  Dinner: pork chop, asparagus, and maybe potato   Beverages: lots of water (not sure how much)    Today:  Looking into blenders - has nutribullet already. " Might consider another type. Going to try her bf's moms first     Trying to do 1/2 gallon of water per day    Part of [place] support group       Progress Towards Previous Goals:   Did not discuss today - session focused on post op diet education and pre-op requirements.    ADDITIONAL INFORMATION:  Works two part time jobs     NUTRITION DIAGNOSIS:  Obesity r/t long history of self-monitoring deficit and excessive energy intake aeb BMI >30 kg/m2.    Altered GI function r/t persistent reflux and decrease esophageal motility AEB pt report, EGD result and document persistent reflux.     Food- and Nutrition-related knowledge deficit r/t lack of prior exposure to information AEB pt unable to verbalize main points of bariatric clear and low-fat full liquid diet.    INTERVENTION:  Intervention Provided/Education:   1. Reviewed and modified previous goals  2. Reviewed post-op diet guidelines, vitamins/minerals essential post-operatively, GI anatomy of bariatric surgeries,and ways to help prepare for post-op diet guidelines pre-operatively.  3. Answered patients questions   4. Patient instructions via Apiary Message  5. Coordination of care with team.    Expected Engagement: good    GOALS:  1. Follow the bariatric post-op diet advancement schedule:    - Bariatric clear liquid diet the day before surgery and day of surgery (while in the hospital).    - Bariatric low-fat full liquid diet on post-op days 1 through 13 (2 weeks)    2. Sip on 48-64 oz (or greater) fluids daily, recording intake to help stay on-track.  - Drink at least 1-2 oz of fluid every 15-30 min.    3. Stop multivitamin at surgery. We will discuss starting a chewable multivitamin at 1 week post op appt.    4. Liquid diet before surgery - included in MyChart message    -Take the following after a Rashida-en-y Gastric Bypass:    Multivitamin/minerals: adult dose 2 times daily      Iron: 45-60 mg elemental daily (18-36 mg daily if low risk) - may partly or fully  be covered in multivitamin       Calcium Citrate containing vitamin D: 500 mg 3 times daily or 600 mg 2 times daily      Vitamin B12: sublingual form of at least 500 mcg daily or injection of 1000 mcg monthly       B-50 Complex daily (depending on MVI picked)    Diet Guidelines after Weight-loss Surgery  [email or web address]     Portion Sizes after Weight Loss Surgery  [email or web address]    Your Stage 1 Diet: Clear Liquids (7/26 - 7/27)  [email or web address]     Your Stage 2 Diet: Low-fat Full Liquids (7/28 - 8/10)  [email or web address]     Your Stage 3 Diet: Pureed Foods (8/11 - 8/24)  [email or web address]     Pureed Pleasures  [email or web address]    Your Stage 4 Diet: Soft Foods (8/25 - 9/21)  [email or web address]    Your Stage 5 Diet: Regular Foods (9/22)  [email or web address]    Keeping Track of Fluids  [email or web address]    Exercise Guidelines after Weight Loss Surgery (1st 4-6 weeks)  [email or web address]    Time spent with patient: 51  minutes.  GLYNN Logan, RD, LD

## 2021-07-01 NOTE — LETTER
"7/1/2021       RE: Minerva Finch  [address]  [place] [address]     Dear Colleague,    Thank you for referring your patient, Minerva Finch, to the [facility] [place] at [facility]. Please see a copy of my visit note below.    Minerva Finch is a 20 year old who is being evaluated via a billable video visit.      The patient has been notified of following:     \"This video visit will be conducted via a call between you and your physician/provider. We have found that certain health care needs can be provided without the need for an in-person physical exam.  This service lets us provide the care you need with a video conversation.  If a prescription is necessary we can send it directly to your pharmacy.  If lab work is needed we can place an order for that and you can then stop by our lab to have the test done at a later time.    Video visits are billed at different rates depending on your insurance coverage.  Please reach out to your insurance provider with any questions.    If during the course of the call the physician/provider feels a video visit is not appropriate, you will not be charged for this service.\"    Patient has given verbal consent for Video visit? Yes  How would you like to obtain your AVS? MyChart  Will anyone else be joining your video visit? Bf    Video-Visit Details    Type of service:  Video Visit    Video Start Time: 9:38 am  Video End Time: 10:29 am    Originating Location (pt. Location): Home    Distant Location (provider location):  [facility] [place]     Platform used for Video Visit: [facility] Bariatric Nutrition Consultation Note    Reason For Visit: Nutrition Assessment    Minerva Finch is a 20 year old presenting today for follow-up bariatric nutrition consult and education on clear liquid and full liquid diets.  Pt is interested in RNYGB with " "Dr. Gallegos to help decrease long standing reflux that has been unresponsive with medication .This is pt's 7th of 6 required nutrition visits prior to surgery.     Pt referred by Dr. Gallegos on January 21, 2021.    Patient with Co-morbidities of obesity including:  Type II DM no  Renal Failure no  Sleep apnea no  Hypertension no   Dyslipidemia no  Joint pain no  Back pain no  GERD yes     ANTHROPOMETRICS:  Initial weight: 215 lbs    Estimated body mass index is 40.57 kg/m  as calculated from the following:    Height as of 5/27/21: 1.6 m (5' 3\").    Weight as of 5/27/21: 103.9 kg (229 lb).     Current weight: 222 lbs per pt     Weight loss required for surgery: 10 lbs (205 lbs)      SUPPLEMENT INFORMATION:   Taking iron (ferrious gluconate)   Daily MVI (vitafusion) gummie     Recent blood transfusion to help with iron status    Got new MVI  One a day women's - 18 mcg iron  Not chewable - large pills. Will try to find a new one     NUTRITION HISTORY:  Per previous RD visit: Pt reports she has been dealing with reflux since she was little, in the past  1 1/2 years she reports the reflux has increase and now barley will keep food down. She will drink a protein shake (Equate) if she does not feel like eating, often this will sit better.   - She has changed her diet in hope of decreasing reflux. She does not eat spicy foods, tomato, carbonated beverages and no greasy foods.   - She reports she will eat her meal at a medium pace.     Over the past month she has not been able to focus on the goals as much as she would like, eating out more and not aware of her portion sizes or what she was eating. She is also eating more greasy foods like french fries as she feels she cannot have them after surgery.    She is trying to switch her sweet intake to a fruit     Pt reports her reflux depends on the day, somedays her body refuses to keep down foods.     Diet Recall:  B: Ensure max for breakfast 30 of protein   L : Shake for lunch: " Herbal life product or sandwich and french fries  D: Varies, eating out     Breakfast: protein shake, maybe some trail mix  Lunch: chicken salad or sandwich maybe a hamburger  Dinner: pork chop, asparagus, and maybe potato   Beverages: lots of water (not sure how much)    Today:  Looking into blenders - has nutribullet already. Might consider another type. Going to try her bf's moms first     Trying to do 1/2 gallon of water per day    Part of [place] support group       Progress Towards Previous Goals:   Did not discuss today - session focused on post op diet education and pre-op requirements.    ADDITIONAL INFORMATION:  Works two part time jobs     NUTRITION DIAGNOSIS:  Obesity r/t long history of self-monitoring deficit and excessive energy intake aeb BMI >30 kg/m2.    Altered GI function r/t persistent reflux and decrease esophageal motility AEB pt report, EGD result and document persistent reflux.     Food- and Nutrition-related knowledge deficit r/t lack of prior exposure to information AEB pt unable to verbalize main points of bariatric clear and low-fat full liquid diet.    INTERVENTION:  Intervention Provided/Education:   1. Reviewed and modified previous goals  2. Reviewed post-op diet guidelines, vitamins/minerals essential post-operatively, GI anatomy of bariatric surgeries,and ways to help prepare for post-op diet guidelines pre-operatively.  3. Answered patients questions   4. Patient instructions via [employer] Message  5. Coordination of care with team.    Expected Engagement: good    GOALS:  1. Follow the bariatric post-op diet advancement schedule:    - Bariatric clear liquid diet the day before surgery and day of surgery (while in the hospital).    - Bariatric low-fat full liquid diet on post-op days 1 through 13 (2 weeks)    2. Sip on 48-64 oz (or greater) fluids daily, recording intake to help stay on-track.  - Drink at least 1-2 oz of fluid every 15-30 min.    3. Stop multivitamin at surgery.  We will discuss starting a chewable multivitamin at 1 week post op appt.    4. Liquid diet before surgery - included in MyChart message    -Take the following after a Rashida-en-y Gastric Bypass:    Multivitamin/minerals: adult dose 2 times daily      Iron: 45-60 mg elemental daily (18-36 mg daily if low risk) - may partly or fully be covered in multivitamin       Calcium Citrate containing vitamin D: 500 mg 3 times daily or 600 mg 2 times daily      Vitamin B12: sublingual form of at least 500 mcg daily or injection of 1000 mcg monthly       B-50 Complex daily (depending on MVI picked)    Diet Guidelines after Weight-loss Surgery  [email or web address]     Portion Sizes after Weight Loss Surgery  [email or web address]    Your Stage 1 Diet: Clear Liquids (7/26 - 7/27)  [email or web address]     Your Stage 2 Diet: Low-fat Full Liquids (7/28 - 8/10)  [email or web address]     Your Stage 3 Diet: Pureed Foods (8/11 - 8/24)  [email or web address]     Pureed Pleasures  [email or web address]    Your Stage 4 Diet: Soft Foods (8/25 - 9/21)  [email or web address]    Your Stage 5 Diet: Regular Foods (9/22)  [email or web address]    Keeping Track of Fluids  [email or web address]    Exercise Guidelines after Weight Loss Surgery (1st 4-6 weeks)  [email or web address]    Time spent with patient: 51  minutes.  GLYNN Logan, RD, LD

## 2021-07-06 ENCOUNTER — VIRTUAL VISIT (OUTPATIENT)
Dept: SURGERY | Facility: CLINIC | Age: 21
End: 2021-07-06
Payer: COMMERCIAL

## 2021-07-06 ENCOUNTER — PRE VISIT (OUTPATIENT)
Dept: SURGERY | Facility: CLINIC | Age: 21
End: 2021-07-06

## 2021-07-06 ENCOUNTER — ANESTHESIA EVENT (OUTPATIENT)
Dept: SURGERY | Facility: CLINIC | Age: 21
End: 2021-07-06

## 2021-07-06 DIAGNOSIS — Z01.818 PRE-OP EVALUATION: Primary | ICD-10-CM

## 2021-07-06 PROCEDURE — 99203 OFFICE O/P NEW LOW 30 MIN: CPT | Mod: 95 | Performed by: PHYSICIAN ASSISTANT

## 2021-07-06 ASSESSMENT — LIFESTYLE VARIABLES: TOBACCO_USE: 0

## 2021-07-06 ASSESSMENT — PAIN SCALES - GENERAL: PAINLEVEL: NO PAIN (0)

## 2021-07-06 NOTE — ANESTHESIA PREPROCEDURE EVALUATION
Anesthesia Pre-Procedure Evaluation    Patient: Minerva Finch   MRN: [id number] : 2000        Preoperative Diagnosis: * No surgery found *   Procedure :      Past Medical History:   Diagnosis Date     Abdominal pain      ADHD (attention deficit hyperactivity disorder)      Anxiety      Depression      Gastroesophageal reflux disease      Gastroesophageal reflux disease with esophagitis      Intractable vomiting with nausea      Obesity     BMI 37.33 using vitals from 2/3/17     Uncomplicated asthma       Past Surgical History:   Procedure Laterality Date     ENDOSCOPY       ESOPHAGOSCOPY, GASTROSCOPY, DUODENOSCOPY (EGD), COMBINED N/A 2017    Procedure: COMBINED ESOPHAGOSCOPY, GASTROSCOPY, DUODENOSCOPY (EGD), BIOPSY SINGLE OR MULTIPLE;  Surgeon: David Valle MD;  Location: [facility]      ESOPHAGOSCOPY, GASTROSCOPY, DUODENOSCOPY (EGD), COMBINED N/A 2017    Procedure: COMBINED ESOPHAGOSCOPY, GASTROSCOPY, DUODENOSCOPY (EGD), BIOPSY SINGLE OR MULTIPLE;  Upper endoscopy with biopsy;  Surgeon: Yancy Chacko MD;  Location: [facility]      ESOPHAGOSCOPY, GASTROSCOPY, DUODENOSCOPY (EGD), COMBINED N/A 10/28/2020    Procedure: ESOPHAGOGASTRODUODENOSCOPY, WITH BIOPSY;  Surgeon: Juni Callahan DO;  Location: [facility] OR      ESOPH/GAS REFLUX TEST W NASAL IMPED >1 HR N/A 4/3/2017    Procedure: ESOPHAGEAL IMPEDENCE FUNCTION TEST WITH 24 HOUR PH GREATER THAN 1 HOUR;  Surgeon: Yancy Chacko MD;  Location: [facility]      tonsillectomy and adenoidectomy        Allergies   Allergen Reactions     Kiwi Extract Shortness Of Breath and Anaphylaxis     Hives and tongue swells     Levsin [Hyoscyamine] Shortness Of Breath and Anaphylaxis     Baclofen      Vomiting     Omeprazole      Violent vomiting      Social History     Tobacco Use     Smoking status: Passive Smoke Exposure - Never Smoker     Smokeless tobacco: Never Used     Tobacco comment: mom smokes outside    Substance Use Topics     Alcohol use: No     Alcohol/week: 0.0 standard drinks      Wt Readings from Last 1 Encounters:   05/27/21 103.9 kg (229 lb)        Anesthesia Evaluation   Pt has had prior anesthetic.     No history of anesthetic complications       ROS/MED HX  ENT/Pulmonary:     (+) asthma (h/o sports induced asthma as a child)  (-) tobacco use   Neurologic: Comment: ADHD      Cardiovascular:  - neg cardiovascular ROS   (+) -----No previous cardiac testing  (-) taking anticoagulants/antiplatelets   METS/Exercise Tolerance: >4 METS Comment: Walking/ jogs   Hematologic:     (+) anemia, history of blood transfusion, no previous transfusion reaction,     Musculoskeletal:  - neg musculoskeletal ROS     GI/Hepatic:     (+) GERD, Symptomatic, hiatal hernia,     Renal/Genitourinary:  - neg Renal ROS     Endo:     (+) Obesity (BMI 40),     Psychiatric/Substance Use:     (+) psychiatric history anxiety and depression     Infectious Disease:  - neg infectious disease ROS     Malignancy:  - neg malignancy ROS     Other:               OUTSIDE LABS:  CBC:   Lab Results   Component Value Date    WBC 9.7 07/21/2017    WBC 6.9 03/28/2007    HGB 8.7 (L) 07/21/2017    HGB 7.7 (L) 04/14/2017    HCT 30.0 (L) 07/21/2017    HCT 35.3 03/28/2007     07/21/2017     03/28/2007     BMP: No results found for: NA, POTASSIUM, CHLORIDE, CO2, BUN, CR, GLC  COAGS: No results found for: PTT, INR, FIBR  POC:   Lab Results   Component Value Date    HCG Negative 10/28/2020     HEPATIC:   Lab Results   Component Value Date    ALBUMIN 3.2 (L) 07/21/2017    PROTTOTAL 6.8 07/21/2017    ALT 27 07/21/2017    AST 15 07/21/2017    ALKPHOS 75 07/21/2017    BILITOTAL 0.2 07/21/2017     OTHER: No results found for: PH, LACT, A1C, SHEEBA, PHOS, MAG, LIPASE, AMYLASE, TSH, T4, T3, CRP, SED          PAC Discussion and Assessment    ASA Classification: 3  Case is suitable for: [place]  Anesthetic techniques and relevant risks discussed:  GA                  PAC Resident/NP Anesthesia Assessment: Minerva Finch is a 20 year old female scheduled for CREATION, GASTRIC BYPASS, LAPAROSCOPIC, HERNIORRHAPHY, HIATAL, LAPAROSCOPIC on 7/27/21 by Dr. Gallegos in treatment of obesity.  PAC referral for risk assessment and optimization for anesthesia with comorbid conditions of asthma, anemia, ADHD, GERD, depression, anxiety:        Pre-operative considerations:    1.  Cardiac:  Functional status- METS >4. Denies cardiac symptoms. intermediate risk surgery with 0.9% (RCRI #) risk of major adverse cardiac event.        2.  Pulm:  Airway feasible.  ALFREDO risk: low.  ~non smoker    ~h/o uncomplicated asthma, no current medications        3.  GI:  Risk of PONV score = 3.  If > 2, anti-emetic intervention recommended.    ~GERD using tums, rabeprazole. Hiatal hernia. Above procedure planned      4.  psych: depression and anxiety using Prozac      5. Endo: BMI 40, above procedure planned     6. Heme: anemia. Most recent hgb 8.3 6/2020. She reports she has had a transfusion within the past 3 months. She is scheduled to see Dr. De 7/8/21 for preoperative recommendations.       VTE risk: 0.5%        Patient is optimized and is acceptable candidate for the proposed procedure, pending Dr. De recommendations     She needs to completed preoperative labs per the bariatric team. She is going back to [place] today and requests those lab orders be faxed to [facility]. I will send a message to alert the surgery team      **For further details of assessment, testing, and physical exam please see H and P completed on same date.            GORDO Arteaga PA-C

## 2021-07-06 NOTE — PROGRESS NOTES
Minerva is a 20 year old who is being evaluated via a billable video visit.      How would you like to obtain your AVS? [email or web address]  If the video visit is dropped, the invitation should be resent by: Text to cell phone: [phone]    HPI         Review of Systems       Physical Exam

## 2021-07-06 NOTE — PATIENT INSTRUCTIONS
Preparing for Your Surgery      Name:  Minerva Finch   MRN:  [id number]   :  2000   Today's Date:  2021       Arriving for surgery:  Surgery date:  21  Arrival time:  7:45 am    Restrictions due to COVID 19:       One visitor is allowed in the Pre Op area. When you go into surgery, one visitor is allowed to wait in the Surgery Waiting Room       (provided there is enough space to social distance).   After surgery- Two visitors are allowed at a time if you have a private room and one visitor is allowed for those in a semi-private room.   Every 4 days the visitor(s) can rotate. During the 4 day period, the visitor(s) must be consistent. No visitors under the age of 18 years old.   Visiting Hours: 8 am - 8:30 pm   No ill visitors.   All visitors must wear face mask.    [email or web address] parking is available for anyone with mobility limitations or disabilities.  ([facility]  24 hours/ 7 days a week; [facility]  7 am- 3:30 pm, Mon- Fri)    Please come to:     [facility] [address]  [address] [place] [address]    - ? parking is available in front of the hospital      -    Please proceed to [address] on the 3rd floor. [phone]?     - ?If you are in need of directions, wheelchair or escort please stop at the Information Desk in the lobby.  Inform the information person that you are here for surgery; a wheelchair and escort to [address] will be provided.?     What can I eat or drink?  -  Follow Bariatric Surgery instructions for eating and drinking before your procedure.    Examples of clear liquids:  Water  Clear broth  Juices (apple, white grape, white cranberry  and cider) without pulp  Noncarbonated, powder based beverages  (lemonade and Itz-Aid)  Sodas (Sprite, 7-Up, ginger ale and seltzer)  Coffee or tea (without milk or cream)  Gatorade    -  No Alcohol for at least 24 hours before surgery     Which medicines can I take?    Hold Aspirin  for 7 days before surgery.   Hold Multivitamins for 7 days before surgery.  Hold Supplements for 7 days before surgery.  Hold Ibuprofen (Advil, Motrin) for 1 day before surgery--unless otherwise directed by surgeon.  Hold Naproxen (Aleve) for 4 days before surgery.    -  DO NOT take these medications the day of surgery:  Calcium Carbonate (TUMS)    -  PLEASE TAKE these medications the day of surgery:  Fluoxetine (Prozac)  Ondansetron (Zofran) if needed  Rabeprazole (Aciphex)     How do I prepare myself?  - Please take 2 showers before surgery using Scrubcare or Hibiclens soap.    Use this soap only from the neck to your toes.     Leave the soap on your skin for one minute--then rinse thoroughly.      You may use your own shampoo and conditioner; no other hair products.   - Please remove all jewelry and body piercings.  - No lotions, deodorants or fragrance.  - No makeup or fingernail polish.   - Bring your ID and insurance card.    - All patients are required to have a Covid-19 test within 4 days of surgery/procedure.      -Patients will be contacted by the [facility] scheduling team within 1 week of surgery to make an appointment.      - Patients may call the Scheduling team at [phone] if they have not been scheduled within 4 days of  surgery.      ALL PATIENTS GOING HOME THE SAME DAY OF SURGERY ARE REQUIRED TO HAVE A RESPONSIBLE ADULT TO DRIVE AND BE IN ATTENDANCE WITH THEM FOR 24 HOURS FOLLOWING SURGERY.      Questions or Concerns:    - For any questions regarding the day of surgery or your hospital stay, please contact the Pre Admission Nursing Office at [phone].       - If you have health changes between today and your surgery please call your surgeon.       For questions after surgery please call your surgeons office.

## 2021-07-06 NOTE — H&P
Pre-Operative H & P     CC:  Preoperative exam to assess for increased cardiopulmonary risk while undergoing surgery and anesthesia.    Date of Encounter: 7/6/2021  Primary Care Physician:  Pari Tenorio  associated diagnosis: obesity    HPI  Minerva Finch is a 20 year old female who presents for pre-operative H & P in preparation for CREATION, GASTRIC BYPASS, LAPAROSCOPIC, HERNIORRHAPHY, HIATAL, LAPAROSCOPIC with Dr. saleem on 7/27/21 at [facility]. Patient is being evaluated for comorbid conditions of asthma, anemia, ADHD, GERD, depression, anxiety      Ms. Finch has a history of severe class 3 erosive esophagitis in conjunction with obesity. She has tried other methods of weight loss without long-term success. She has been followed by the bariatric team and recommendation was made for the above procedure. She is now scheduled for the above surgery.           Past Medical History  Past Medical History:   Diagnosis Date     Abdominal pain      ADHD (attention deficit hyperactivity disorder)      Anxiety      Depression      Gastroesophageal reflux disease      Gastroesophageal reflux disease with esophagitis      Intractable vomiting with nausea      Obesity     BMI 37.33 using vitals from 2/3/17     Uncomplicated asthma        Past Surgical History  Past Surgical History:   Procedure Laterality Date     ENDOSCOPY       ESOPHAGOSCOPY, GASTROSCOPY, DUODENOSCOPY (EGD), COMBINED N/A 2/9/2017    Procedure: COMBINED ESOPHAGOSCOPY, GASTROSCOPY, DUODENOSCOPY (EGD), BIOPSY SINGLE OR MULTIPLE;  Surgeon: David Valle MD;  Location: [facility]      ESOPHAGOSCOPY, GASTROSCOPY, DUODENOSCOPY (EGD), COMBINED N/A 7/21/2017    Procedure: COMBINED ESOPHAGOSCOPY, GASTROSCOPY, DUODENOSCOPY (EGD), BIOPSY SINGLE OR MULTIPLE;  Upper endoscopy with biopsy;  Surgeon: Yancy Chacko MD;  Location: [facility]      ESOPHAGOSCOPY, GASTROSCOPY, DUODENOSCOPY (EGD),  COMBINED N/A 10/28/2020    Procedure: ESOPHAGOGASTRODUODENOSCOPY, WITH BIOPSY;  Surgeon: Juni Callahan DO;  Location: [facility] [place]      ESOPH/GAS REFLUX TEST W NASAL IMPED >1 HR N/A 4/3/2017    Procedure: ESOPHAGEAL IMPEDENCE FUNCTION TEST WITH 24 HOUR PH GREATER THAN 1 HOUR;  Surgeon: Yancy Chacko MD;  Location:  [facility]      tonsillectomy and adenoidectomy         Hx of Blood transfusions/reactions: Patient has a history of transfusion, but denies reactions     Hx of abnormal bleeding or anti-platelet use: denies    Menstrual history: No LMP recorded. Patient has had an injection.    Steroid use in the last year: denies    Personal or FH with difficulty with Anesthesia:  denies    Prior to Admission Medications  Current Outpatient Medications   Medication Sig Dispense Refill     calcium carbonate (TUMS) 500 MG chewable tablet as needed        Ferrous Gluconate 324 (37.5 Fe) MG TABS every morning        FLUoxetine (PROZAC) 20 MG capsule every morning        ondansetron (ZOFRAN) 4 MG tablet Take by mouth every 8 hours as needed for nausea       RABEprazole (ACIPHEX) 20 MG EC tablet Take 20 mg twice daily, taken 30-60 minutes prior to meals 60 tablet 3       Allergies  Allergies   Allergen Reactions     Kiwi Extract Shortness Of Breath and Anaphylaxis     Hives and tongue swells     Levsin [Hyoscyamine] Shortness Of Breath and Anaphylaxis     Baclofen      Vomiting     Omeprazole      Violent vomiting       Social History  Social History     Socioeconomic History     Marital status: Single     Spouse name: Not on file     Number of children: Not on file     Years of education: Not on file     Highest education level: Not on file   Occupational History     Not on file   Social Needs     Financial resource strain: Not on file     Food insecurity     Worry: Not on file     Inability: Not on file     Transportation needs     Medical: Not on file     Non-medical: Not on file   Tobacco Use      Smoking status: Passive Smoke Exposure - Never Smoker     Smokeless tobacco: Never Used     Tobacco comment: mom smokes outside   Substance and Sexual Activity     Alcohol use: No     Alcohol/week: 0.0 standard drinks     Drug use: No     Sexual activity: Not on file   Lifestyle     Physical activity     Days per week: Not on file     Minutes per session: Not on file     Stress: Not on file   Relationships     Social connections     Talks on phone: Not on file     Gets together: Not on file     Attends [employer] service: Not on file     Active member of club or organization: Not on file     Attends meetings of clubs or organizations: Not on file     Relationship status: Not on file     Intimate partner violence     Fear of current or ex partner: Not on file     Emotionally abused: Not on file     Physically abused: Not on file     Forced sexual activity: Not on file   Other Topics Concern     Not on file   Social History Narrative     Not on file       Family History  Family History   Problem Relation Age of Onset     Peptic Ulcer Disease Mother      GERD Mother      Arthritis Mother      Pancreatitis No family hx of      Migraines No family hx of      Anesthesia Reaction No family hx of      Deep Vein Thrombosis (DVT) No family hx of        ROS/MED HX  ENT/Pulmonary:     (+) asthma (h/o sports induced asthma as a child)  (-) tobacco use   Neurologic: Comment: ADHD      Cardiovascular:  - neg cardiovascular ROS   (+) -----No previous cardiac testing  (-) taking anticoagulants/antiplatelets   METS/Exercise Tolerance: >4 METS Comment: Walking/ jogs   Hematologic:     (+) anemia, history of blood transfusion, no previous transfusion reaction,     Musculoskeletal:  - neg musculoskeletal ROS     GI/Hepatic:     (+) GERD, Symptomatic, hiatal hernia,     Renal/Genitourinary:  - neg Renal ROS     Endo:     (+) Obesity (BMI 40),     Psychiatric/Substance Use:     (+) psychiatric history anxiety and depression     Infectious  Disease:  - neg infectious disease ROS     Malignancy:  - neg malignancy ROS     Other:            The complete review of systems is negative other than noted in the HPI or here.       0 lbs 0 oz  Data Unavailable   There is no height or weight on file to calculate BMI.       Physical Exam  Constitutional: Awake, alert, cooperative, no apparent distress, and appears stated age.   Respiratory: non labored breathing   Neuropsychiatric: Calm, cooperative. Normal affect.     Please refer to the physical examination documented by the anesthesiologist in the anesthesia record on the day of surgery    Labs: (personally reviewed)  Patient is heading back to [place] and requests labs ordered per Dr. Gallegos's team be faxed to [facility]- [phone]. I will alert their team,      ASSESSMENT and PLAN  Minerva Finch is a 20 year old female scheduled for CREATION, GASTRIC BYPASS, LAPAROSCOPIC, HERNIORRHAPHY, HIATAL, LAPAROSCOPIC on 7/27/21 by Dr. Gallegos in treatment of obesity.  PAC referral for risk assessment and optimization for anesthesia with comorbid conditions of asthma, anemia, ADHD, GERD, depression, anxiety:        Pre-operative considerations:    1.  Cardiac:  Functional status- METS >4. Denies cardiac symptoms. intermediate risk surgery with 0.9% (RCRI #) risk of major adverse cardiac event.        2.  Pulm:  Airway feasible.  ALFREDO risk: low.  ~non smoker    ~h/o uncomplicated asthma, no current medications        3.  GI:  Risk of PONV score = 3.  If > 2, anti-emetic intervention recommended.    ~GERD using tums, rabeprazole. Hiatal hernia. Above procedure planned      4.  psych: depression and anxiety using Prozac      5. Endo: BMI 40, above procedure planned     6. Heme: anemia. Most recent hgb 8.3 6/2020. She reports she has had a transfusion within the past 3 months. She is scheduled to see Dr. De 7/8/21 for preoperative recommendations.       VTE risk: 0.5%        Patient is optimized and is  acceptable candidate for the proposed procedure, pending Dr. De recommendations     She needs to completed preoperative labs per the bariatric team. She is going back to [place] today and requests those lab orders be faxed to [facility]. I will send a message to alert the surgery team    30 minutes were spent completing chart review, seeing the patient, reviewing labs and test results and completing documentation     Meggan Salazar PA-C  Preoperative Assessment Center  [facility]  Phone: [phone]  Fax: [phone]

## 2021-07-07 ENCOUNTER — TELEPHONE (OUTPATIENT)
Dept: ENDOCRINOLOGY | Facility: CLINIC | Age: 21
End: 2021-07-07

## 2021-07-07 NOTE — TELEPHONE ENCOUNTER
Faxed lab orders for UA and CBC to [facility] in [place], fax [phone], patient seen in PAC 7/6/21.

## 2021-07-08 ENCOUNTER — VIRTUAL VISIT (OUTPATIENT)
Dept: ONCOLOGY | Facility: CLINIC | Age: 21
End: 2021-07-08
Attending: SURGERY
Payer: COMMERCIAL

## 2021-07-08 DIAGNOSIS — D50.0 IRON DEFICIENCY ANEMIA DUE TO CHRONIC BLOOD LOSS: ICD-10-CM

## 2021-07-08 DIAGNOSIS — D50.0 IRON DEFICIENCY ANEMIA DUE TO CHRONIC BLOOD LOSS: Primary | ICD-10-CM

## 2021-07-08 DIAGNOSIS — D64.9 ANEMIA: Primary | ICD-10-CM

## 2021-07-08 PROCEDURE — 999N001193 HC VIDEO/TELEPHONE VISIT; NO CHARGE

## 2021-07-08 PROCEDURE — 99205 OFFICE O/P NEW HI 60 MIN: CPT | Mod: 95 | Performed by: INTERNAL MEDICINE

## 2021-07-08 RX ORDER — METHYLPREDNISOLONE SODIUM SUCCINATE 125 MG/2ML
125 INJECTION, POWDER, LYOPHILIZED, FOR SOLUTION INTRAMUSCULAR; INTRAVENOUS
Status: CANCELLED
Start: 2021-07-08

## 2021-07-08 RX ORDER — ALBUTEROL SULFATE 0.83 MG/ML
2.5 SOLUTION RESPIRATORY (INHALATION)
Status: CANCELLED | OUTPATIENT
Start: 2021-07-08

## 2021-07-08 RX ORDER — EPINEPHRINE 1 MG/ML
0.3 INJECTION, SOLUTION INTRAMUSCULAR; SUBCUTANEOUS EVERY 5 MIN PRN
Status: CANCELLED | OUTPATIENT
Start: 2021-07-08

## 2021-07-08 RX ORDER — HEPARIN SODIUM (PORCINE) LOCK FLUSH IV SOLN 100 UNIT/ML 100 UNIT/ML
5 SOLUTION INTRAVENOUS
Status: CANCELLED | OUTPATIENT
Start: 2021-07-08

## 2021-07-08 RX ORDER — ONDANSETRON 4 MG/1
TABLET, ORALLY DISINTEGRATING ORAL
COMMUNITY
Start: 2021-01-21 | End: 2021-09-10

## 2021-07-08 RX ORDER — MEDROXYPROGESTERONE ACETATE 150 MG/ML
150 INJECTION, SUSPENSION INTRAMUSCULAR
COMMUNITY
Start: 2020-10-14

## 2021-07-08 RX ORDER — MEPERIDINE HYDROCHLORIDE 25 MG/ML
25 INJECTION INTRAMUSCULAR; INTRAVENOUS; SUBCUTANEOUS EVERY 30 MIN PRN
Status: CANCELLED | OUTPATIENT
Start: 2021-07-08

## 2021-07-08 RX ORDER — ALBUTEROL SULFATE 90 UG/1
1-2 AEROSOL, METERED RESPIRATORY (INHALATION)
Status: CANCELLED
Start: 2021-07-08

## 2021-07-08 RX ORDER — NALOXONE HYDROCHLORIDE 0.4 MG/ML
0.2 INJECTION, SOLUTION INTRAMUSCULAR; INTRAVENOUS; SUBCUTANEOUS
Status: CANCELLED | OUTPATIENT
Start: 2021-07-08

## 2021-07-08 RX ORDER — HEPARIN SODIUM,PORCINE 10 UNIT/ML
5 VIAL (ML) INTRAVENOUS
Status: CANCELLED | OUTPATIENT
Start: 2021-07-08

## 2021-07-08 RX ORDER — DIPHENHYDRAMINE HYDROCHLORIDE 50 MG/ML
50 INJECTION INTRAMUSCULAR; INTRAVENOUS
Status: CANCELLED
Start: 2021-07-08

## 2021-07-08 NOTE — PROGRESS NOTES
"Minerva is a 20 year old who is being evaluated via a billable video visit.      How would you like to obtain your AVS? MyChart  If the video visit is dropped, the invitation should be resent by: Text to cell phone: [phone]  Will anyone else be joining your video visit? No       I have reviewed and updated the patient's allergies and medication list.    Concerns: none  Refills: none     Vitals - Patient Reported  Weight (Patient Reported): 97.5 kg (215 lb)  Height (Patient Reported): 160 cm (5' 3\")  BMI (Based on Pt Reported Ht/Wt): 38.09  Pain Score: Mild Pain (2)  Pain Loc: Other - see comment(acid reflux)      Lily Oro CMA        Video-Visit Details    The visit was initially scheduled to be done by video, but due to technical difficulty with the platform, we converted to telephone.  "

## 2021-07-08 NOTE — LETTER
"    7/8/2021         RE: Minerva Finch  [address] [place] [address]        Dear Colleague,    Thank you for referring your patient, Minerva Finch, to the [facility]. Please see a copy of my visit note below.    Minerva is a 20 year old who is being evaluated via a billable video visit.      How would you like to obtain your AVS? MyChart  If the video visit is dropped, the invitation should be resent by: Text to cell phone: [phone]  Will anyone else be joining your video visit? No       I have reviewed and updated the patient's allergies and medication list.    Concerns: none  Refills: none     Vitals - Patient Reported  Weight (Patient Reported): 97.5 kg (215 lb)  Height (Patient Reported): 160 cm (5' 3\")  BMI (Based on Pt Reported Ht/Wt): 38.09  Pain Score: Mild Pain (2)  Pain Loc: Other - see comment(acid reflux)      Lily Oro CMA        Video-Visit Details    The visit was initially scheduled to be done by video, but due to technical difficulty with the platform, we converted to telephone.      HEMATOLOGY CLINIC VISIT  Date:  07/08/2021      NOTE:  Due to the ongoing COVID-19 pandemic, this visit was conducted by telephone / video, with the patient's approval.  The visit was initially scheduled be done by video, but due to technical difficulty with the platform, we converted to telephone.      Minerva Finch is a 20-year-old woman referred for evaluation and management of iron deficiency anemia.  She is scheduled for a laparoscopic Rashida-en-Y gastric bypass procedure on July 27, 2021 to treat both longstanding severe erosive esophagitis and also obesity.    She has a long history of irregular menstrual bleeding.  Last year she had a progestin implant which unfortunately caused her to have menstrual bleeding on a daily basis for several months.  The implant was subsequently removed.  She is currently back to having irregular menstrual bleeding.  On the " heaviest days she needs to change her pads / tampons every 3 hours.  She recently started Depo-Provera which has decreased her menstrual flow considerably although she still is having spotting on essentially a daily basis.    She has a history of iron deficiency dating back to at least 2017 for which she has received red blood cell transfusions on 4 occasions.  Most recent  transfusion was given in late May 2021.  She started on oral iron in January 2020 (ferrous gluconate 1 tablet daily).  She is taking this consistently and seems to be tolerating it well from a GI side effect perspective.  She has never received IV iron.    She notes decreased energy and fatigue.  She does not have pica for ice or any other substance.  Other than menstrual blood loss, she denies any other bleeding symptoms.    Physical exam: Not done, telephone visit.    Labs:  Outside labs reviewed.  Labs obtained on May 28, 2021 include a CBC which showed a white count of 7.9 with a normal differential, hemoglobin 8.2, MCV 70, platelet count 408,000.  Serum ferritin was 4.  Subsequently she received 2 units of packed red blood cells.  Repeat labs on June 24, 2021 showed a hemoglobin of 10.8.  Iron panel was not rechecked.      ASSESSMENT / PLAN:  Iron deficiency anemia    Minerva is a 20-year-old woman with longstanding iron deficiency anemia which appears to be due to a combination of menstrual blood loss and severe erosive esophagitis which she has had for many years.  Although she seems to tolerate oral iron well, this has been inadequate in terms of improving her iron status.    She is about to undergo a surgical procedure which will render her ability to absorb oral iron severely impaired.  She should expect to be dependent on IV iron replacement long-term as a result of this.  In addition, we would ideally get her iron replete prior to the surgery.    Our plan is to give her a dose of low molecular weight iron dextran (INFeD) a few days  ahead of the surgery.  It will also be important to recheck her iron status relatively soon after the surgery to be sure that she is fully iron replete.  It is possible she may need another dose of IV iron.  Going forward, I would recommend monitoring her iron status every 3 to 4 months to determine how long of an interval she will be able to tolerate between IV iron infusions.    In addition, we discussed that control of her menstrual blood loss will also be an important factor in trying to minimize her need for IV iron.  The more menstrual bleeding she has, the more frequently she will need IV iron replacement.  I encouraged her to discuss this with her primary care provider and take what ever steps are possible to minimize her menstrual bleeding.      Total time 60 minutes, including review of medical records and labs, video / telephone visit, and documentation.      Stuart Dow MD  Professor of Medicine  Division of Hematology, Oncology, and Transplantation  Director, [facility]            Again, thank you for allowing me to participate in the care of your patient.        Sincerely,        Stuart Dow MD

## 2021-07-08 NOTE — PROGRESS NOTES
HEMATOLOGY CLINIC VISIT  Date:  07/08/2021      NOTE:  Due to the ongoing COVID-19 pandemic, this visit was conducted by telephone / video, with the patient's approval.  The visit was initially scheduled be done by video, but due to technical difficulty with the platform, we converted to telephone.      Minerva Finch is a 20-year-old woman referred for evaluation and management of iron deficiency anemia.  She is scheduled for a laparoscopic Rashida-en-Y gastric bypass procedure on July 27, 2021 to treat both longstanding severe erosive esophagitis and also obesity.    She has a long history of irregular menstrual bleeding.  Last year she had a progestin implant which unfortunately caused her to have menstrual bleeding on a daily basis for several months.  The implant was subsequently removed.  She is currently back to having irregular menstrual bleeding.  On the heaviest days she needs to change her pads / tampons every 3 hours.  She recently started Depo-Provera which has decreased her menstrual flow considerably although she still is having spotting on essentially a daily basis.    She has a history of iron deficiency dating back to at least 2017 for which she has received red blood cell transfusions on 4 occasions.  Most recent  transfusion was given in late May 2021.  She started on oral iron in January 2020 (ferrous gluconate 1 tablet daily).  She is taking this consistently and seems to be tolerating it well from a GI side effect perspective.  She has never received IV iron.    She notes decreased energy and fatigue.  She does not have pica for ice or any other substance.  Other than menstrual blood loss, she denies any other bleeding symptoms.    Physical exam: Not done, telephone visit.    Labs:  Outside labs reviewed.  Labs obtained on May 28, 2021 include a CBC which showed a white count of 7.9 with a normal differential, hemoglobin 8.2, MCV 70, platelet count 408,000.  Serum ferritin was 4.  Subsequently  she received 2 units of packed red blood cells.  Repeat labs on June 24, 2021 showed a hemoglobin of 10.8.  Iron panel was not rechecked.      ASSESSMENT / PLAN:  Iron deficiency anemia    Minerva is a 20-year-old woman with longstanding iron deficiency anemia which appears to be due to a combination of menstrual blood loss and severe erosive esophagitis which she has had for many years.  Although she seems to tolerate oral iron well, this has been inadequate in terms of improving her iron status.    She is about to undergo a surgical procedure which will render her ability to absorb oral iron severely impaired.  She should expect to be dependent on IV iron replacement long-term as a result of this.  In addition, we would ideally get her iron replete prior to the surgery.    Our plan is to give her a dose of low molecular weight iron dextran (INFeD) a few days ahead of the surgery.  It will also be important to recheck her iron status relatively soon after the surgery to be sure that she is fully iron replete.  It is possible she may need another dose of IV iron.  Going forward, I would recommend monitoring her iron status every 3 to 4 months to determine how long of an interval she will be able to tolerate between IV iron infusions.    In addition, we discussed that control of her menstrual blood loss will also be an important factor in trying to minimize her need for IV iron.  The more menstrual bleeding she has, the more frequently she will need IV iron replacement.  I encouraged her to discuss this with her primary care provider and take what ever steps are possible to minimize her menstrual bleeding.      Total time 60 minutes, including review of medical records and labs, video / telephone visit, and documentation.      Stuart Dow MD  Professor of Medicine  Division of Hematology, Oncology, and Transplantation  Director, [facility]

## 2021-07-09 ENCOUNTER — TELEPHONE (OUTPATIENT)
Dept: ENDOCRINOLOGY | Facility: CLINIC | Age: 21
End: 2021-07-09

## 2021-07-09 NOTE — TELEPHONE ENCOUNTER
"Called patient to let her know I had received a letter from [employer] stating \"Recent denial of similar procedure for bariatric services dated 6/22/21. Resubmission not allowed for at least 60 post denial. Please follow appeals process or resubmit when time allows.\"     Patient understandably upset, said I would resubmit 8/22 and request OR date within 10-15 days from submission.   "

## 2021-07-12 ENCOUNTER — TELEPHONE (OUTPATIENT)
Dept: SURGERY | Facility: CLINIC | Age: 21
End: 2021-07-12

## 2021-07-12 ENCOUNTER — TELEPHONE (OUTPATIENT)
Dept: ENDOCRINOLOGY | Facility: CLINIC | Age: 21
End: 2021-07-12

## 2021-07-12 NOTE — TELEPHONE ENCOUNTER
Pt has questions about surg. Her surg has been canceled and she is wondering if she is still supposed to see RD and provider    [phone]

## 2021-07-17 DIAGNOSIS — Z11.59 ENCOUNTER FOR SCREENING FOR OTHER VIRAL DISEASES: ICD-10-CM

## 2021-07-25 ENCOUNTER — TELEPHONE (OUTPATIENT)
Dept: MULTI SPECIALTY CLINIC | Facility: CLINIC | Age: 21
End: 2021-07-25

## 2021-07-26 ENCOUNTER — TELEPHONE (OUTPATIENT)
Dept: ENDOCRINOLOGY | Facility: CLINIC | Age: 21
End: 2021-07-26

## 2021-07-26 NOTE — TELEPHONE ENCOUNTER
Received call as GI fellow on call this evening from Ms. Finch with worsening reflux symptoms, nausea symptoms, and new hematemesis.     Has had worsened reflux this past week, having worsened nausea, can't keep most food down, essentially only eating protein shakes and keeping water down. Does not feel lightheaded, dizzy, doesn't feel she is dehydrated. Also notes streaks of blood in her vomit the last couple of days which is new. Has been taking multiple Tums every day. Also taking Acephex and Zofran. Doesn't seem to be helping.     She has history of severe esophagitis, ulcers, has known hiatal hernia as well. Has seen GI in the past, last in February per chart review. Also follows with surgery, was scheduled for Rashida en Y gastric bypass at the end of this month but was recently scheduled, planning to reschedule for later this year.    Discussed her symptoms at length with her - scant streaks of blood are likely from Mallery Montoya type tear, do not sound life threatening and I don't think warrant urgent evaluation in the ED. She may require repeat upper endoscopy (last in October) to r/o recurrent ulcer or severe esophagitis, but will defer this to her usual GI/Surgery team. Offered ED evaluation, but she does not feel her symptoms are severe enough at this time, will wait and call back tomorrow and speak to her Surgical team. In the meantime, I recommended avoiding trigger foods, taking Tums PRN, can trial OTC H2 blocker, keep taking Acephex, and try soothing/cool foods such as yogurt, milk, shakes, etc. If she were to develop larger volume blood in her vomitus, she should seek evaluation in the ED in [place] where she currently is immediately.  She is in agreement with the plan.    Mateo Vinson MD  GI Fellow

## 2021-07-26 NOTE — TELEPHONE ENCOUNTER
Patient left VM message today stating she wanted to talk to Dr. Gallegos's nurse for some issues she is having. Will direct message to Loraine Lane RN to address her concerns.

## 2021-07-27 ENCOUNTER — PATIENT OUTREACH (OUTPATIENT)
Dept: ENDOCRINOLOGY | Facility: CLINIC | Age: 21
End: 2021-07-27

## 2021-07-27 NOTE — PROGRESS NOTES
Continues to have problems with reflux.  She is having problems with sleep because of symptoms and worry/anxiety.  Discussed tryin OTC Pepcid AC chewable tablets instead of Tums.  Cautioned her on the overuse of Tums.  Encouraged her to reach out to PCP to discuss better control of anxiety symptoms.  Patient is agreeable.

## 2021-08-13 ENCOUNTER — INFUSION THERAPY VISIT (OUTPATIENT)
Dept: ONCOLOGY | Facility: CLINIC | Age: 21
End: 2021-08-13
Attending: INTERNAL MEDICINE
Payer: COMMERCIAL

## 2021-08-13 VITALS
TEMPERATURE: 98.6 F | OXYGEN SATURATION: 97 % | DIASTOLIC BLOOD PRESSURE: 86 MMHG | SYSTOLIC BLOOD PRESSURE: 129 MMHG | RESPIRATION RATE: 18 BRPM | HEART RATE: 72 BPM

## 2021-08-13 DIAGNOSIS — D50.0 IRON DEFICIENCY ANEMIA DUE TO CHRONIC BLOOD LOSS: Primary | ICD-10-CM

## 2021-08-13 LAB
BASOPHILS # BLD AUTO: 0.1 10E3/UL (ref 0–0.2)
BASOPHILS NFR BLD AUTO: 1 %
EOSINOPHIL # BLD AUTO: 0.1 10E3/UL (ref 0–0.7)
EOSINOPHIL NFR BLD AUTO: 1 %
ERYTHROCYTE [DISTWIDTH] IN BLOOD BY AUTOMATED COUNT: 18.6 % (ref 10–15)
FERRITIN SERPL-MCNC: 4 NG/ML (ref 12–150)
HCT VFR BLD AUTO: 37.7 % (ref 35–47)
HGB BLD-MCNC: 11 G/DL (ref 11.7–15.7)
IMM GRANULOCYTES # BLD: 0 10E3/UL
IMM GRANULOCYTES NFR BLD: 0 %
IRON SATN MFR SERPL: 5 % (ref 15–46)
IRON SERPL-MCNC: 19 UG/DL (ref 35–180)
LYMPHOCYTES # BLD AUTO: 1.5 10E3/UL (ref 0.8–5.3)
LYMPHOCYTES NFR BLD AUTO: 23 %
MCH RBC QN AUTO: 21.6 PG (ref 26.5–33)
MCHC RBC AUTO-ENTMCNC: 29.2 G/DL (ref 31.5–36.5)
MCV RBC AUTO: 74 FL (ref 78–100)
MONOCYTES # BLD AUTO: 0.8 10E3/UL (ref 0–1.3)
MONOCYTES NFR BLD AUTO: 11 %
NEUTROPHILS # BLD AUTO: 4.3 10E3/UL (ref 1.6–8.3)
NEUTROPHILS NFR BLD AUTO: 64 %
NRBC # BLD AUTO: 0 10E3/UL
NRBC BLD AUTO-RTO: 0 /100
PLATELET # BLD AUTO: 397 10E3/UL (ref 150–450)
RBC # BLD AUTO: 5.09 10E6/UL (ref 3.8–5.2)
TIBC SERPL-MCNC: 377 UG/DL (ref 240–430)
WBC # BLD AUTO: 6.7 10E3/UL (ref 4–11)

## 2021-08-13 PROCEDURE — 82728 ASSAY OF FERRITIN: CPT

## 2021-08-13 PROCEDURE — 96366 THER/PROPH/DIAG IV INF ADDON: CPT

## 2021-08-13 PROCEDURE — 258N000003 HC RX IP 258 OP 636: Performed by: INTERNAL MEDICINE

## 2021-08-13 PROCEDURE — 85004 AUTOMATED DIFF WBC COUNT: CPT

## 2021-08-13 PROCEDURE — 36415 COLL VENOUS BLD VENIPUNCTURE: CPT

## 2021-08-13 PROCEDURE — 96365 THER/PROPH/DIAG IV INF INIT: CPT

## 2021-08-13 PROCEDURE — 999N000127 HC STATISTIC PERIPHERAL IV START W US GUIDANCE

## 2021-08-13 PROCEDURE — 250N000011 HC RX IP 250 OP 636: Performed by: INTERNAL MEDICINE

## 2021-08-13 PROCEDURE — 83550 IRON BINDING TEST: CPT

## 2021-08-13 RX ORDER — METHYLPREDNISOLONE SODIUM SUCCINATE 125 MG/2ML
125 INJECTION, POWDER, LYOPHILIZED, FOR SOLUTION INTRAMUSCULAR; INTRAVENOUS
Status: CANCELLED
Start: 2021-08-13

## 2021-08-13 RX ORDER — ALBUTEROL SULFATE 0.83 MG/ML
2.5 SOLUTION RESPIRATORY (INHALATION)
Status: CANCELLED | OUTPATIENT
Start: 2021-08-13

## 2021-08-13 RX ORDER — ALBUTEROL SULFATE 90 UG/1
1-2 AEROSOL, METERED RESPIRATORY (INHALATION)
Status: CANCELLED
Start: 2021-08-13

## 2021-08-13 RX ORDER — HEPARIN SODIUM (PORCINE) LOCK FLUSH IV SOLN 100 UNIT/ML 100 UNIT/ML
5 SOLUTION INTRAVENOUS
Status: CANCELLED | OUTPATIENT
Start: 2021-08-13

## 2021-08-13 RX ORDER — DIPHENHYDRAMINE HYDROCHLORIDE 50 MG/ML
50 INJECTION INTRAMUSCULAR; INTRAVENOUS
Status: CANCELLED
Start: 2021-08-13

## 2021-08-13 RX ORDER — NALOXONE HYDROCHLORIDE 0.4 MG/ML
0.2 INJECTION, SOLUTION INTRAMUSCULAR; INTRAVENOUS; SUBCUTANEOUS
Status: CANCELLED | OUTPATIENT
Start: 2021-08-13

## 2021-08-13 RX ORDER — HEPARIN SODIUM,PORCINE 10 UNIT/ML
5 VIAL (ML) INTRAVENOUS
Status: CANCELLED | OUTPATIENT
Start: 2021-08-13

## 2021-08-13 RX ORDER — MEPERIDINE HYDROCHLORIDE 25 MG/ML
25 INJECTION INTRAMUSCULAR; INTRAVENOUS; SUBCUTANEOUS EVERY 30 MIN PRN
Status: CANCELLED | OUTPATIENT
Start: 2021-08-13

## 2021-08-13 RX ORDER — EPINEPHRINE 1 MG/ML
0.3 INJECTION, SOLUTION INTRAMUSCULAR; SUBCUTANEOUS EVERY 5 MIN PRN
Status: CANCELLED | OUTPATIENT
Start: 2021-08-13

## 2021-08-13 RX ADMIN — SODIUM CHLORIDE 250 ML: 9 INJECTION, SOLUTION INTRAVENOUS at 09:02

## 2021-08-13 RX ADMIN — IRON DEXTRAN 25 MG: 50 INJECTION INTRAMUSCULAR; INTRAVENOUS at 09:02

## 2021-08-13 RX ADMIN — IRON DEXTRAN 975 MG: 50 INJECTION INTRAMUSCULAR; INTRAVENOUS at 10:58

## 2021-08-13 ASSESSMENT — PAIN SCALES - GENERAL: PAINLEVEL: NO PAIN (0)

## 2021-08-13 NOTE — PROGRESS NOTES
Infusion Nursing Note:  Minerva Finch presents today for IV Infed infusion (1st dose)    Patient seen by provider today: No   present during visit today: Not Applicable.    Note: Pt assessed upon arrival to infusion.  Pt states that she is feeling well; denies fever, chills, SOB or cough.  No acute signs or symptoms of illness.  Patient denies any concerns or changes in health since her visit with Dr. Dow on 7/8/21.    Reviewed today's plan of care with patient.  Provided patient with educational handout of IV infed.  Educated patient about possible side effects including allergic reactions to to medication.  Pt verified understanding.    Intravenous Access:  Peripheral IV placed.    Treatment Conditions:     Ref. Range 8/13/2021 08:28   Ferritin Latest Ref Range: 12 - 150 ng/mL 4 (L)   Iron Latest Ref Range: 35 - 180 ug/dL 19 (L)   Iron Binding Cap Latest Ref Range: 240 - 430 ug/dL 377   Iron Saturation Index Latest Ref Range: 15 - 46 % 5 (L)   WBC Latest Ref Range: 4.0 - 11.0 10e3/uL 6.7   Hemoglobin Latest Ref Range: 11.7 - 15.7 g/dL 11.0 (L)   Hematocrit Latest Ref Range: 35.0 - 47.0 % 37.7   Platelet Count Latest Ref Range: 150 - 450 10e3/uL 397   RBC Count Latest Ref Range: 3.80 - 5.20 10e6/uL 5.09   MCV Latest Ref Range: 78 - 100 fL 74 (L)   MCH Latest Ref Range: 26.5 - 33.0 pg 21.6 (L)   MCHC Latest Ref Range: 31.5 - 36.5 g/dL 29.2 (L)   RDW Latest Ref Range: 10.0 - 15.0 % 18.6 (H)   % Neutrophils Latest Units: % 64   % Lymphocytes Latest Units: % 23   % Monocytes Latest Units: % 11   % Eosinophils Latest Units: % 1   Absolute Basophils Latest Ref Range: 0.0 - 0.2 10e3/uL 0.1   % Basophils Latest Units: % 1   Absolute Eosinophils Latest Ref Range: 0.0 - 0.7 10e3/uL 0.1   Absolute Immature Granulocytes Latest Ref Range: <=0.0 10e3/uL 0.0   Absolute Lymphocytes Latest Ref Range: 0.8 - 5.3 10e3/uL 1.5   Absolute Monocytes Latest Ref Range: 0.0 - 1.3 10e3/uL 0.8   % Immature Granulocytes Latest  Units: % 0   Absolute Neutrophils Latest Ref Range: 1.6 - 8.3 10e3/uL 4.3   Absolute NRBCs Latest Units: 10e3/uL 0.0   NRBCs per 100 WBC Latest Ref Range: <1 /100 0       Not Applicable.      Post Infusion Assessment:  Patient tolerated infusion without incident.  Blood return noted pre and post infusion.  Site patent and intact, free from redness, edema or discomfort.  No evidence of extravasations.  Access discontinued per protocol.       Discharge Plan:   Patient declined prescription refills.  Discharge instructions reviewed with: Patient.  Patient and/or family verbalized understanding of discharge instructions and all questions answered.  Copy of AVS reviewed with patient and/or family.    Patient discharged in stable condition accompanied by: self.  Departure Mode: Ambulatory.    Baylee Tatum RN

## 2021-08-13 NOTE — PATIENT INSTRUCTIONS
[facility] Triage and after hours / weekends / holidays:  [phone]    Please call the triage or after hours line if you experience a temperature greater than or equal to 100.5, shaking chills, have uncontrolled nausea, vomiting and/or diarrhea, dizziness, shortness of breath, chest pain, bleeding, unexplained bruising, or if you have any other new/concerning symptoms, questions or concerns.      If you are having any concerning symptoms or wish to speak to a provider before your next infusion visit, please call your care coordinator or triage to notify them so we can adequately serve you.     If you need a refill on a narcotic prescription or other medication, please call before your infusion appointment.                 August 2021 Sunday Monday Tuesday Wednesday Thursday Friday Saturday   1     2     3     4     5     6     7       8     9     10     11  Happy Birthday!     12     13    ONC INFUSION 6 HR (360 MIN)   8:00 AM   (360 min.)    ONC INFUSION NURSE   [facility] 14       15     16     17     18     19     20     21       22     23     24     25     26     27     28       29     30     31                                     September 2021 Sunday Monday Tuesday Wednesday Thursday Friday Saturday                  1     2     3     4       5     6     7     8     9     10     11       12     13     14    CREATION, GASTRIC BYPASS, LAPAROSCOPIC   8:00 AM   Sergo Gallegos MD   [facility] OR 15     16     17     18       19     20     21     22     23     24     25       26     27     28     29     30                              Recent Results (from the past 24 hour(s))   Ferritin    Collection Time: 08/13/21  8:28 AM   Result Value Ref Range    Ferritin 4 (L) 12 - 150 ng/mL   Iron and iron binding capacity    Collection Time: 08/13/21  8:28 AM   Result Value Ref Range    Iron 19 (L) 35 - 180 ug/dL    Iron Binding Capacity 377 240 - 430 ug/dL    Iron Sat Index 5 (L) 15  - 46 %   CBC with platelets and differential    Collection Time: 08/13/21  8:28 AM   Result Value Ref Range    WBC Count 6.7 4.0 - 11.0 10e3/uL    RBC Count 5.09 3.80 - 5.20 10e6/uL    Hemoglobin 11.0 (L) 11.7 - 15.7 g/dL    Hematocrit 37.7 35.0 - 47.0 %    MCV 74 (L) 78 - 100 fL    MCH 21.6 (L) 26.5 - 33.0 pg    MCHC 29.2 (L) 31.5 - 36.5 g/dL    RDW 18.6 (H) 10.0 - 15.0 %    Platelet Count 397 150 - 450 10e3/uL    % Neutrophils 64 %    % Lymphocytes 23 %    % Monocytes 11 %    % Eosinophils 1 %    % Basophils 1 %    % Immature Granulocytes 0 %    NRBCs per 100 WBC 0 <1 /100    Absolute Neutrophils 4.3 1.6 - 8.3 10e3/uL    Absolute Lymphocytes 1.5 0.8 - 5.3 10e3/uL    Absolute Monocytes 0.8 0.0 - 1.3 10e3/uL    Absolute Eosinophils 0.1 0.0 - 0.7 10e3/uL    Absolute Basophils 0.1 0.0 - 0.2 10e3/uL    Absolute Immature Granulocytes 0.0 <=0.0 10e3/uL    Absolute NRBCs 0.0 10e3/uL

## 2021-08-31 ENCOUNTER — TELEPHONE (OUTPATIENT)
Dept: ENDOCRINOLOGY | Facility: CLINIC | Age: 21
End: 2021-08-31

## 2021-08-31 NOTE — TELEPHONE ENCOUNTER
Called [employer] to check if Rashida-en-Y has been authorized yet, still pending. Submitted 8/23, told needs 10 working days to prior authorize.     Contacted patient with above information. She is currently in [place] and will be back in [place] on 9/8. Scheduled in-person PAC appointment on 9/10 and COVID-19 on 9/10 at the [facility].     Patient requested I ask Loraine MARTINES RN to give her a call for teaching, will send inbasket request today.

## 2021-08-31 NOTE — TELEPHONE ENCOUNTER
FUTURE VISIT INFORMATION      SURGERY INFORMATION:    Date: 9/14/21    Location: [facility] or    Surgeon:  Sergo Gallegos MD    Anesthesia Type:  general    Procedure: CREATION, GASTRIC BYPASS, LAPAROSCOPIC HERNIORRHAPHY, HIATAL, LAPAROSCOPIC    Consult: 8/6/2020     RECORDS REQUESTED FROM:         Primary Care Provider: TOMMY Schilling MD ([facility]  )     Pertinent Medical History: Fatty Liver

## 2021-09-03 ENCOUNTER — TELEPHONE (OUTPATIENT)
Dept: ENDOCRINOLOGY | Facility: CLINIC | Age: 21
End: 2021-09-03

## 2021-09-03 NOTE — TELEPHONE ENCOUNTER
Called nell because I received a letter dated 9/3 that states - Procedure should be done at a [facility] facility. Would you like to switch to one?  Spoke with Halima JACKMAN in the Medicaid Prior Authorization Department who found that we are a STEVE and authorized this patient's Rashida-en-Y gastric bypass scheduled 9/14/21 with Dr. Gallegos under authorization # [id number].

## 2021-09-08 ENCOUNTER — TELEPHONE (OUTPATIENT)
Dept: ENDOCRINOLOGY | Facility: CLINIC | Age: 21
End: 2021-09-08

## 2021-09-08 NOTE — TELEPHONE ENCOUNTER
Called patient to ask her to go to her primary care to get vitals checked on 9/22 as she has a one-week postop video visit with Dr. Gallegos on 9/23 and have the result faxed to us. She will set this up.

## 2021-09-10 ENCOUNTER — ANESTHESIA EVENT (OUTPATIENT)
Dept: SURGERY | Facility: CLINIC | Age: 21
End: 2021-09-10

## 2021-09-10 ENCOUNTER — ALLIED HEALTH/NURSE VISIT (OUTPATIENT)
Dept: ENDOCRINOLOGY | Facility: CLINIC | Age: 21
End: 2021-09-10
Payer: COMMERCIAL

## 2021-09-10 ENCOUNTER — OFFICE VISIT (OUTPATIENT)
Dept: SURGERY | Facility: CLINIC | Age: 21
End: 2021-09-10
Payer: COMMERCIAL

## 2021-09-10 ENCOUNTER — LAB (OUTPATIENT)
Dept: LAB | Facility: CLINIC | Age: 21
End: 2021-09-10
Attending: SURGERY
Payer: COMMERCIAL

## 2021-09-10 ENCOUNTER — LAB (OUTPATIENT)
Dept: LAB | Facility: CLINIC | Age: 21
End: 2021-09-10

## 2021-09-10 ENCOUNTER — PRE VISIT (OUTPATIENT)
Dept: SURGERY | Facility: CLINIC | Age: 21
End: 2021-09-10

## 2021-09-10 VITALS
TEMPERATURE: 97.9 F | HEIGHT: 63 IN | HEART RATE: 75 BPM | OXYGEN SATURATION: 97 % | BODY MASS INDEX: 40.52 KG/M2 | SYSTOLIC BLOOD PRESSURE: 130 MMHG | WEIGHT: 228.7 LBS | DIASTOLIC BLOOD PRESSURE: 84 MMHG | RESPIRATION RATE: 12 BRPM

## 2021-09-10 DIAGNOSIS — Z01.818 PREOP EXAMINATION: ICD-10-CM

## 2021-09-10 DIAGNOSIS — E66.813 CLASS 3 SEVERE OBESITY WITH BODY MASS INDEX (BMI) OF 40.0 TO 44.9 IN ADULT, UNSPECIFIED OBESITY TYPE, UNSPECIFIED WHETHER SERIOUS COMORBIDITY PRESENT (H): ICD-10-CM

## 2021-09-10 DIAGNOSIS — E66.01 CLASS 3 SEVERE OBESITY WITH BODY MASS INDEX (BMI) OF 40.0 TO 44.9 IN ADULT, UNSPECIFIED OBESITY TYPE, UNSPECIFIED WHETHER SERIOUS COMORBIDITY PRESENT (H): ICD-10-CM

## 2021-09-10 DIAGNOSIS — E66.9 OBESITY (BMI 30-39.9): Primary | ICD-10-CM

## 2021-09-10 DIAGNOSIS — Z11.59 ENCOUNTER FOR SCREENING FOR OTHER VIRAL DISEASES: ICD-10-CM

## 2021-09-10 DIAGNOSIS — Z01.818 PREOP EXAMINATION: Primary | ICD-10-CM

## 2021-09-10 LAB
ABO/RH(D): NORMAL
ANION GAP SERPL CALCULATED.3IONS-SCNC: 8 MMOL/L (ref 3–14)
ANTIBODY SCREEN: NEGATIVE
BUN SERPL-MCNC: 16 MG/DL (ref 7–30)
CALCIUM SERPL-MCNC: 9.5 MG/DL (ref 8.5–10.1)
CHLORIDE BLD-SCNC: 109 MMOL/L (ref 94–109)
CO2 SERPL-SCNC: 24 MMOL/L (ref 20–32)
CREAT SERPL-MCNC: 0.8 MG/DL (ref 0.52–1.04)
ERYTHROCYTE [DISTWIDTH] IN BLOOD BY AUTOMATED COUNT: 23.4 % (ref 10–15)
GFR SERPL CREATININE-BSD FRML MDRD: >90 ML/MIN/1.73M2
GLUCOSE BLD-MCNC: 84 MG/DL (ref 70–99)
HCT VFR BLD AUTO: 42.5 % (ref 35–47)
HGB BLD-MCNC: 13.1 G/DL (ref 11.7–15.7)
MCH RBC QN AUTO: 24.9 PG (ref 26.5–33)
MCHC RBC AUTO-ENTMCNC: 30.8 G/DL (ref 31.5–36.5)
MCV RBC AUTO: 81 FL (ref 78–100)
PLATELET # BLD AUTO: 316 10E3/UL (ref 150–450)
POTASSIUM BLD-SCNC: 4.1 MMOL/L (ref 3.4–5.3)
RBC # BLD AUTO: 5.26 10E6/UL (ref 3.8–5.2)
SODIUM SERPL-SCNC: 141 MMOL/L (ref 133–144)
SPECIMEN EXPIRATION DATE: NORMAL
WBC # BLD AUTO: 8.9 10E3/UL (ref 4–11)

## 2021-09-10 PROCEDURE — 99207 PR NO CHARGE NURSE ONLY: CPT

## 2021-09-10 PROCEDURE — 36415 COLL VENOUS BLD VENIPUNCTURE: CPT | Performed by: PATHOLOGY

## 2021-09-10 PROCEDURE — 99214 OFFICE O/P EST MOD 30 MIN: CPT | Performed by: PHYSICIAN ASSISTANT

## 2021-09-10 PROCEDURE — U0005 INFEC AGEN DETEC AMPLI PROBE: HCPCS | Performed by: SURGERY

## 2021-09-10 PROCEDURE — 80048 BASIC METABOLIC PNL TOTAL CA: CPT | Performed by: PATHOLOGY

## 2021-09-10 PROCEDURE — 86900 BLOOD TYPING SEROLOGIC ABO: CPT | Performed by: PHYSICIAN ASSISTANT

## 2021-09-10 PROCEDURE — 85027 COMPLETE CBC AUTOMATED: CPT | Performed by: PATHOLOGY

## 2021-09-10 RX ORDER — HYDROXYZINE HYDROCHLORIDE 25 MG/1
25 TABLET, FILM COATED ORAL DAILY PRN
COMMUNITY
Start: 2021-08-02

## 2021-09-10 RX ORDER — AMOXICILLIN 250 MG
2 CAPSULE ORAL DAILY PRN
Qty: 30 TABLET | Refills: 1 | Status: SHIPPED | OUTPATIENT
Start: 2021-09-10 | End: 2021-10-25

## 2021-09-10 RX ORDER — METHOCARBAMOL 500 MG/1
500 TABLET, FILM COATED ORAL 4 TIMES DAILY PRN
Qty: 15 TABLET | Refills: 0 | Status: SHIPPED | OUTPATIENT
Start: 2021-09-10

## 2021-09-10 RX ORDER — FLUOXETINE 40 MG/1
40 CAPSULE ORAL EVERY MORNING
COMMUNITY
Start: 2021-08-02

## 2021-09-10 RX ORDER — ONDANSETRON 4 MG/1
4 TABLET, ORALLY DISINTEGRATING ORAL EVERY 8 HOURS PRN
Qty: 15 TABLET | Refills: 0 | Status: SHIPPED | OUTPATIENT
Start: 2021-09-10 | End: 2021-10-25

## 2021-09-10 ASSESSMENT — MIFFLIN-ST. JEOR: SCORE: 1771.51

## 2021-09-10 ASSESSMENT — PAIN SCALES - GENERAL: PAINLEVEL: NO PAIN (0)

## 2021-09-10 NOTE — PROGRESS NOTES
This patient is having Rashida-N-Y by Dr. Gallegos.    The following handouts were reviewed with the patient :  Before Your Surgery, Patient Checklist, Weight Loss Surgery Pre-operative Class, Preop Recommendations Quick Reference Guide, History and Physical, Medications to Avoid, Shower or Bathing Before Surgery, Bowel Preparation, Powerful Choices and [place] [facility] Directive.  Questions were addressed and understanding of content was verbalized.  Contact information was provided.    Patient goal weight: 205  Weight today: 227  Home weight 215 9/7/21    Call [phone] Adán Butt to confirm surgery date.   Call [phone] to schedule your pre-operative history and physical with our PAC clinic.      Patient currently taking opioid/narcotic pain medications? No.

## 2021-09-10 NOTE — PATIENT INSTRUCTIONS
Preparing for Your Surgery      Name:  Minerva Finch   MRN:  [id number]   :  2000   Today's Date:  9/10/2021       Arriving for surgery:  Surgery date:  21  Arrival time:  6:00 am    Restrictions due to COVID 19:       One visitor is allowed in the Pre Op area. When you go into surgery, one visitor is allowed to wait in the Surgery Waiting Room       (provided there is enough space to social distance).   After surgery- Two visitors are allowed at a time if you have a private room and one visitor is allowed for those in a semi-private room.   Every 4 days the visitor(s) can rotate. During the 4 day period, the visitor(s) must be consistent. No visitors under the age of 18 years old.   Visiting Hours: 8 am - 8:30 pm   No ill visitors.   All visitors must wear face mask.    [facility] parking is available for anyone with mobility limitations or disabilities.  ([facility]  24 hours/ 7 days a week; [facility]  7 am- 3:30 pm, Mon- Fri)    Please come to:     [facility] [address]  [address] [place] [address]    - ? parking is available in front of the hospital      -    Please proceed to [address] on the 3rd floor. [phone]?     - ?If you are in need of directions, wheelchair or escort please stop at the Information Desk in the lobby.  Inform the information person that you are here for surgery; a wheelchair and escort to [address] will be provided.?     What can I eat or drink?  -  Follow Bariatric Surgery instructions for eating and drinking before your procedure.    Examples of clear liquids:  Water  Clear broth  Juices (apple, white grape, white cranberry  and cider) without pulp  Noncarbonated, powder based beverages  (lemonade and Itz-Aid)  Sodas (Sprite, 7-Up, ginger ale and seltzer)  Coffee or tea (without milk or cream)  Gatorade    -  No Alcohol for at least 24 hours before surgery     Which medicines can I take?    Hold Aspirin  for 7 days before surgery.   Hold Multivitamins for 7 days before surgery.  Hold Supplements for 7 days before surgery.  Hold Ibuprofen (Advil, Motrin) for 1 day before surgery--unless otherwise directed by surgeon.  Hold Naproxen (Aleve) for 4 days before surgery.    -  DO NOT take these medications the day of surgery:  Docusate Sodium (Colace)  Calcium Carbonate (TUMS)    -  PLEASE TAKE these medications the day of surgery:  Fluoxetine (Prozac)  Hydroxyzine (Atarax)  Ondansetron (Zofran)  Rabeprazole (Aciphex)    How do I prepare myself?  - Please take 2 showers before surgery using Scrubcare or Hibiclens soap.    Use this soap only from the neck to your toes.     Leave the soap on your skin for one minute--then rinse thoroughly.      You may use your own shampoo and conditioner; no other hair products.   - Please remove all jewelry and body piercings.  - No lotions, deodorants or fragrance.  - No makeup or fingernail polish.   - Bring your ID and insurance card.    -If you have a Deep Brain Stimulator, Spinal Cord Stimulator or any neuro stimulator device---you must bring the remote control to the hospital     - All patients are required to have a Covid-19 test within 4 days of surgery/procedure.      -Patients will be contacted by the [facility] scheduling team within 1 week of surgery to make an appointment.      - Patients may call the Scheduling team at [phone] if they have not been scheduled within 4 days of  surgery.      Questions or Concerns:    - For any questions regarding the day of surgery or your hospital stay, please contact the Pre Admission Nursing Office at [phone].       - If you have health changes between today and your surgery please call your surgeon.       For questions after surgery please call your surgeons office.

## 2021-09-10 NOTE — H&P
Pre-Operative H & P     CC:  Preoperative exam to assess for increased cardiopulmonary risk while undergoing surgery and anesthesia.    Date of Encounter: 9/10/2021  Primary Care Physician:  Jerri Miller     Reason for visit:   Encounter Diagnoses   Name Primary?     Preop examination Yes     Class 3 severe obesity with body mass index (BMI) of 40.0 to 44.9 in adult, unspecified obesity type, unspecified whether serious comorbidity present (H)        HPI  Minerva Finch is a 21 year old female who presents for pre-operative H & P in preparation for CREATION, GASTRIC BYPASS, LAPAROSCOPIC with Dr. Gallegos on 9/14/21 at [facility].     Minerva is a 21-year-old female with past medical history significant for childhood exercise-induced asthma, anemia, anemia history of blood transfusion, obesity, erosive esophagitis, irregular menstrual bleeding, depression, and anxiety.    Ms. Finch has a history of severe class 3 erosive esophagitis in conjunction with obesity. She has tried other methods of weight loss without long-term success. She has been followed by the bariatric team and recommendation was made for the above procedure. She is now scheduled for the above surgery.     History is obtained from the patient and chart review      Hx of abnormal bleeding or anti-platelet use: denies    Menstrual history: No LMP recorded. Patient has had an injection.:     Prior to Admission Medications  Current Outpatient Medications   Medication Sig Dispense Refill     calcium carbonate (TUMS) 500 MG chewable tablet as needed        docusate sodium (COLACE) 50 MG capsule Take by mouth daily as needed        Ferrous Gluconate 324 (37.5 Fe) MG TABS 2 times daily        FLUoxetine (PROZAC) 40 MG capsule every morning        hydrOXYzine (ATARAX) 25 MG tablet Take 25 mg by mouth daily as needed        medroxyPROGESTERone (DEPO-PROVERA) 150 MG/ML IM injection Inject 150 mg into the muscle  "every 3 months        ondansetron (ZOFRAN) 4 MG tablet Take by mouth every 8 hours as needed for nausea       RABEprazole (ACIPHEX) 20 MG EC tablet Take 20 mg twice daily, taken 30-60 minutes prior to meals (Patient taking differently: Take 20 mg by mouth 2 times daily Take 20 mg twice daily, taken 30-60 minutes prior to meals) 60 tablet 3       Family History  Family History   Problem Relation Age of Onset     Peptic Ulcer Disease Mother      GERD Mother      Arthritis Mother      Pancreatitis No family hx of      Migraines No family hx of      Anesthesia Reaction No family hx of      Deep Vein Thrombosis (DVT) No family hx of        The complete review of systems is negative other than noted in the HPI or here.       Anesthesia Evaluation   Pt has had prior anesthetic.     No history of anesthetic complications       ROS/MED HX  ENT/Pulmonary:     (+) ALFREDO risk factors, snores loudly, obese,  (-) recent URI   Neurologic:       Cardiovascular:  - neg cardiovascular ROS   (+) -----No previous cardiac testing     METS/Exercise Tolerance: >4 METS    Hematologic:     (+) anemia, history of blood transfusion, no previous transfusion reaction,     Musculoskeletal:  - neg musculoskeletal ROS     GI/Hepatic:     (+) GERD, liver disease (fatty liver),     Renal/Genitourinary:  - neg Renal ROS     Endo:     (+) Obesity,  (-) Type I DM, Type II DM and thyroid disease   Psychiatric/Substance Use:     (+) psychiatric history anxiety and depression     Infectious Disease:  - neg infectious disease ROS  (-) Recent Fever   Malignancy:  - neg malignancy ROS     Other:  - neg other ROS          /84 (BP Location: Right arm, Patient Position: Sitting, Cuff Size: Adult Regular)   Pulse 75   Temp 97.9  F (36.6  C) (Oral)   Resp 12   Ht 1.6 m (5' 3\")   Wt 103.7 kg (228 lb 11.2 oz)   SpO2 97%   Breastfeeding No   BMI 40.51 kg/m           Physical Exam  Constitutional: Awake, alert, cooperative, no apparent distress, and " appears stated age.  Eyes: Pupils equal, round and reactive to light, extra ocular muscles intact, sclera clear, conjunctiva normal.  HENT: Normocephalic, oral pharynx with moist mucus membranes, good dentition. No goiter appreciated.   Respiratory: Clear to auscultation bilaterally, no crackles or wheezing.  Cardiovascular: Regular rate and rhythm, normal S1 and S2, and no murmur noted.  Carotids +2, no bruits. No edema. Palpable pulses to radial  DP and PT arteries.   GI: Normal bowel sounds  Lymph/Hematologic: No cervical lymphadenopathy and no supraclavicular lymphadenopathy.  Genitourinary:  deferred  Skin: Warm and dry.    Musculoskeletal: Full ROM of neck. There is no redness, warmth, or swelling of the joints. Gross motor strength is normal.    Neurologic: Awake, alert, oriented to name, place and time. Cranial nerves II-XII are grossly intact.   Neuropsychiatric: Calm, cooperative. Normal affect.     PRIOR LABS/DIAGNOSTIC STUDIES:   All labs and imaging personally reviewed     Component      Latest Ref Rng & Units 9/10/2021   WBC      4.0 - 11.0 10e3/uL 8.9   RBC Count      3.80 - 5.20 10e6/uL 5.26 (H)   Hemoglobin      11.7 - 15.7 g/dL 13.1   Hematocrit      35.0 - 47.0 % 42.5   MCV      78 - 100 fL 81   MCH      26.5 - 33.0 pg 24.9 (L)   MCHC      31.5 - 36.5 g/dL 30.8 (L)   RDW      10.0 - 15.0 % 23.4 (H)   Platelet Count      150 - 450 10e3/uL 316     Component      Latest Ref Rng & Units 9/10/2021   Sodium      133 - 144 mmol/L 141   Potassium      3.4 - 5.3 mmol/L 4.1   Chloride      94 - 109 mmol/L 109   Carbon Dioxide      20 - 32 mmol/L 24   Anion Gap      3 - 14 mmol/L 8   Urea Nitrogen      7 - 30 mg/dL 16   Creatinine      0.52 - 1.04 mg/dL 0.80   Calcium      8.5 - 10.1 mg/dL 9.5   Glucose      70 - 99 mg/dL 84   GFR Estimate      >60 mL/min/1.73m2 >90         EKG/ stress test - if available please see in ROS above   No results found.            Outside records reviewed from: care  everywhere            ASSESSMENT and PLAN  Minerva Finch is a 21 year old female scheduled for CREATION, GASTRIC BYPASS, LAPAROSCOPIC on 9/14/21 by Dr. Gallegos in treatment of morbid obesity, GERD.  PAC referral for risk assessment and optimization for anesthesia:    Pre-operative considerations:  1.  Cardiac:  Functional status- METS >4.  Intermediate risk surgery with 0.9% (RCRI #) risk of major adverse cardiac event.   --denies chest pain, SOB, WILDE, palpitations, lightheadeness  --denies cardiac history    2.  Pulm:  Airway feasible.  ALFREDO risk: Low  --chart indicates mild asthma.  Pt reports this was childhood EIA  --non smoker    3.  GI:  Risk of PONV score = 3.  If > 2, anti-emetic intervention recommended.  --GERD/erosive esophagitis  --continue Aciphex    4.  Heme:  --hx of history of iron deficiency anemia due to menstrual blood loss and severe erosive esophagitis. Hgb 11.0 8/13/21.  --hx of blood transfusions  --Seen by Dr. Dow, given IV Infed infusion 8/13/21  --will recheck hgb today and needs hgb checked after surgery to determine if IV iron needed again.    5.  Psych:  --anxiety and depression  --continue Prozac and hctz    VTE risk: 0.5%    Patient is optimized and is acceptable candidate for the proposed procedure.  No further diagnostic evaluation is needed.               Heather Ling PA-C  Preoperative Assessment Center  [facility]  Cl[facility] and [facility] Center  Phone: [phone]  Fax: [phone]

## 2021-09-10 NOTE — PATIENT INSTRUCTIONS
Minerva Finch,    Below is a recap of our conversation regarding your upcoming Sleeve Gastrectomy on 9/14/21.    DAY BEFORE YOUR SURGERY     - Follow a clear liquid diet.  Stay away from red liquids and liquids with pulp.     - Ensure you are well hydrated all day!     - Nothing to eat after midnight.  You may have clear liquids up to 3 hours before your surgery.      - Take your medications as directed from the PAC clinic.    SHOWER    - Follow instructions for pre-op shower using the required soap (chlorhexidine).      - You will take a shower the night before surgery and a shower the morning of surgery.  The soap can be very drying.  Do not put lotion on.    TRANSPORTATION & CARE    - You will need a  to take you to the hospital the day of surgery.    - You will need a  to pick you up from the hospital the day of discharge (usually the afternoon/evening the day after surgery).    - You will need someone to stay with you for the first 1-2 days after surgery, especially if you are taking prescription pain medicine.      AFTER SURGERY    MEDICATIONS     - Depending on the size and number of medications you take, you may need to space/change the time you take your medication so you do not overfill your stomach.   - Make sure you follow-up with your primary provider to make medication changes needed.   - If you have diabetes, follow-up with your provider that orders your diabetes medications and check your blood sugar regularly.    You will receive the following prescriptions at discharge (unless you are allergic to or have other reasons not to take them):      Aciphex: This medication is for control of stomach acid.      Robaxin (methocarbamol): This medication is to help control spasms/cramping in the stomach and intestines.  Take as needed for cramping.      Zofran (ondansetron):  This medication is for nausea.  The medication is to be placed on the tongue and allowed to dissolve. Take as directed for  nausea.    Senna: This medication is a stool softener:  Take as directed.  You may cut it in half to make it easier to swallow.  It is important to take this medication if you are taking a narcotic pain medicine as the pain medication can be constipating.  Senna can be purchased over-the-counter.      Narcotic pain medicine: Take as directed for pain.  If you are not having a lot of pain, you can take Tylenol or Extra Strength Tylenol per the bottle instructions.  The pain medicine can cause constipation.  Do not drive while taking narcotic pain medication.      PAIN CONTROL    - Take your pain medicine as needed, as directed.    - You can use Tylenol or Tylenol Extra Strength if you are not having a lot of pain and also to supplement during the day.  DO NOT TAKE NSAIDS: IBUPROFEN, ASPIRIN OR NAPROXEN.    - Use an ice pack on the incisions for the first 24 - 48 hours:  Use a barrier between the ice pack and the skin.  Do not leave the ice on longer than 20 minutes at each time.      - Change positions frequently.  Walking around once an hour will also help.    - You may also try a heating pad on your abdomen to help soothe cramping.  Use a barrier between the heating pad and your skin.  Do not leave on longer than 20 minutes at each time.    HYDRATION    - You will be provided with a small medicine cup after surgery.  It holds one ounce of fluid (30 mL).  You need to drink one of these (1 oz) every 10 to 15 .    - Your goal is 48 to 64 ounces each day.  This amount will help prevent dehydration.      - Keep a tally sheet next to you and zhao each time you drink one ounce of fluid.  You should have at least 4-6 marks for each hour.    - Try keeping a water bottle by your bed.  Drink a little before going to sleep, if you wake in the middle of the night, and in the morning before getting out of bed.    - Keep an eye on your urine.  It is a good indicator of your hydration status.  Your urine should be clear yellow or  clear light yellow.      DIET    For Dr. Gallegos Patients:     - You will be following the Stage 2 - Full Liquid diet upon discharge.  Ensure you have a variety of proper full liquids at home to support you in taking in the proper nutrition.     - Please refer to the Stage 2 - Full Liquid diet handout provided by the Dietician.    BREATHING AND ACTIVITY    - Use your incentive spirometer each hour while awake.  Sitting up or standing, take 5 - 10 slow, deep breaths each time, focusing on expanding your lungs.  This should be done for the first couple of weeks after surgery.    - Get up and walk around each hour while you are awake - at least 10 minutes.  Walking will help with circulation, bowel regulation and will help you feel better.    -  Do not lift anything greater than 10 lb for the first 4 weeks.    WOUND  CARE  You may have surgical glue or steri-strips over your incision after surgery.    - Surgical glue: looks like a clear film over your incisions and will wear off a little at a time over the first 7-10 days after surgery.      - Steri-Strips: Adhesive strips of tape over your incisions.  They will fall off over the first 7-10 days after surgery.    Showering: you may shower the day you get home unless your surgeon tells you differently.    - Wash gently around incisions with warm soapy water, rinse well, and gently pat dry.    - No tub baths until all incisions are healed.      FOLLOW-UP CALL    - You will receive a post-op phone call two days after surgery to check in and see how you are doing (If your surgery is on a Friday, your phone call will be on the Monday following your surgery).  You can always send us a message via [employer]oora and the Get Well [place] chandu.    GET WELL LOOP  [email or web address]    - Track your fluid intake!    - Reminders to set up follow up appointments.    - Communicate with nursing staff.    - Please use [employer]a for any concerns regarding your health.    Please let us  know if you have any additional questions.    Sincerely,    Loraine Lane RN, BSN  RN Care Coordinator  Bariatric Surgery and Comprehensive Weight Management  Phone: [phone]

## 2021-09-10 NOTE — ANESTHESIA PREPROCEDURE EVALUATION
Anesthesia Pre-Procedure Evaluation    Patient: Minerva Finch   MRN: [id number] : 2000        Preoperative Diagnosis: * No surgery found *   Procedure :      Past Medical History:   Diagnosis Date     Abdominal pain      ADHD (attention deficit hyperactivity disorder)      Anxiety      Depression      Gastroesophageal reflux disease      Gastroesophageal reflux disease with esophagitis      Intractable vomiting with nausea      Obesity     BMI 37.33 using vitals from 2/3/17     Uncomplicated asthma       Past Surgical History:   Procedure Laterality Date     ENDOSCOPY       ESOPHAGOSCOPY, GASTROSCOPY, DUODENOSCOPY (EGD), COMBINED N/A 2017    Procedure: COMBINED ESOPHAGOSCOPY, GASTROSCOPY, DUODENOSCOPY (EGD), BIOPSY SINGLE OR MULTIPLE;  Surgeon: David Valle MD;  Location: [facility]      ESOPHAGOSCOPY, GASTROSCOPY, DUODENOSCOPY (EGD), COMBINED N/A 2017    Procedure: COMBINED ESOPHAGOSCOPY, GASTROSCOPY, DUODENOSCOPY (EGD), BIOPSY SINGLE OR MULTIPLE;  Upper endoscopy with biopsy;  Surgeon: Yancy Chacko MD;  Location: [facility]      ESOPHAGOSCOPY, GASTROSCOPY, DUODENOSCOPY (EGD), COMBINED N/A 10/28/2020    Procedure: ESOPHAGOGASTRODUODENOSCOPY, WITH BIOPSY;  Surgeon: Juni Callahan DO;  [facility]: [facility]      ESOPH/GAS REFLUX TEST W NASAL IMPED >1 HR N/A 4/3/2017    Procedure: ESOPHAGEAL IMPEDENCE FUNCTION TEST WITH 24 HOUR PH GREATER THAN 1 HOUR;  Surgeon: Yancy Chacko MD;  Location: [facility]      tonsillectomy and adenoidectomy        Allergies   Allergen Reactions     Kiwi Extract Shortness Of Breath and Anaphylaxis     Hives and tongue swells     Levsin [Hyoscyamine] Shortness Of Breath and Anaphylaxis     Baclofen      Vomiting     Omeprazole      Violent vomiting      Social History     Tobacco Use     Smoking status: Passive Smoke Exposure - Never Smoker     Smokeless tobacco: Never Used     Tobacco comment: mom smokes outside    Substance Use Topics     Alcohol use: No     Alcohol/week: 0.0 standard drinks      Wt Readings from Last 1 Encounters:   05/27/21 103.9 kg (229 lb)        Anesthesia Evaluation   Pt has had prior anesthetic.     No history of anesthetic complications       ROS/MED HX  ENT/Pulmonary:     (+) ALFREDO risk factors, snores loudly, obese,  (-) recent URI   Neurologic:       Cardiovascular:  - neg cardiovascular ROS   (+) -----No previous cardiac testing     METS/Exercise Tolerance: >4 METS    Hematologic:     (+) anemia, history of blood transfusion, no previous transfusion reaction,     Musculoskeletal:  - neg musculoskeletal ROS     GI/Hepatic:     (+) GERD, liver disease (fatty liver),     Renal/Genitourinary:  - neg Renal ROS     Endo:     (+) Obesity,  (-) Type I DM, Type II DM and thyroid disease   Psychiatric/Substance Use:     (+) psychiatric history anxiety and depression     Infectious Disease:  - neg infectious disease ROS  (-) Recent Fever   Malignancy:  - neg malignancy ROS     Other:  - neg other ROS          Physical Exam    Airway        Mallampati: II   TM distance: > 3 FB   Neck ROM: full   Mouth opening: > 3 cm    Respiratory Devices and Support         Dental  no notable dental history         Cardiovascular          Rhythm and rate: regular and normal     Pulmonary   pulmonary exam normal        breath sounds clear to auscultation           OUTSIDE LABS:  CBC:   Lab Results   Component Value Date    WBC 6.7 08/13/2021    WBC 9.7 07/21/2017    HGB 11.0 (L) 08/13/2021    HGB 8.7 (L) 07/21/2017    HCT 37.7 08/13/2021    HCT 30.0 (L) 07/21/2017     08/13/2021     07/21/2017     BMP: No results found for: NA, POTASSIUM, CHLORIDE, CO2, BUN, CR, GLC  COAGS: No results found for: PTT, INR, FIBR  POC:   Lab Results   Component Value Date    HCG Negative 10/28/2020     HEPATIC:   Lab Results   Component Value Date    ALBUMIN 3.2 (L) 07/21/2017    PROTTOTAL 6.8 07/21/2017    ALT 27 07/21/2017     AST 15 07/21/2017    ALKPHOS 75 07/21/2017    BILITOTAL 0.2 07/21/2017     OTHER: No results found for: PH, LACT, A1C, SHEEBA, PHOS, MAG, LIPASE, AMYLASE, TSH, T4, T3, CRP, SED          PAC Discussion and Assessment    ASA Classification: 3  Case is suitable for: [place]  Anesthetic techniques and relevant risks discussed: GA  Invasive monitoring and risk discussed: No    Possibility and Risk of blood transfusion discussed: Yes            PAC Resident/NP Anesthesia Assessment: Minerva Finch is a 21 year old female scheduled for CREATION, GASTRIC BYPASS, LAPAROSCOPIC on 9/14/21 by Dr. Gallegos in treatment of morbid obesity, GERD.  PAC referral for risk assessment and optimization for anesthesia:    Pre-operative considerations:  1.  Cardiac:  Functional status- METS >4.  Intermediate risk surgery with 0.9% (RCRI #) risk of major adverse cardiac event.   --denies chest pain, SOB, WILDE, palpitations, lightheadeness  --denies cardiac history    2.  Pulm:  Airway feasible.  ALFREDO risk: Low  --chart indicates mild asthma.  Pt reports this was childhood EIA  --non smoker    3.  GI:  Risk of PONV score = 3.  If > 2, anti-emetic intervention recommended.  --GERD/erosive esophagitis  --continue Aciphex    4.  Heme:  --hx of history of iron deficiency anemia due to menstrual blood loss and severe erosive esophagitis. Hgb 11.0 8/13/21.  --hx of blood transfusions  --Seen by Dr. Dow, given IV Infed infusion 8/13/21  --will recheck hgb today and needs hgb checked after surgery to determine if IV iron needed again.    5.  Psych:  --anxiety and depression  --continue Prozac and hctz    VTE risk: 0.5%    Patient is optimized and is acceptable candidate for the proposed procedure.  No further diagnostic evaluation is needed.         **For further details of assessment, testing, and physical exam please see H and P completed on same date.          Heather Ling PA-C, [facility]    Reviewed and Signed by PAC Mid-Level  Provider/Resident  Mid-Level Provider/Resident: Heatehr Ling  Date: 9/10/21        Reviewed and Signed by PAC Anesthesiologist  Anesthesiologist: Malik  Date: jolanta Ling PA-C

## 2021-09-11 LAB — SARS-COV-2 RNA RESP QL NAA+PROBE: POSITIVE

## 2021-09-12 ENCOUNTER — NURSE TRIAGE (OUTPATIENT)
Dept: NURSING | Facility: CLINIC | Age: 21
End: 2021-09-12

## 2021-09-12 ENCOUNTER — TELEPHONE (OUTPATIENT)
Dept: SURGERY | Facility: CLINIC | Age: 21
End: 2021-09-12

## 2021-09-12 NOTE — TELEPHONE ENCOUNTER
Friday's test was positive: done [facility]. 2 hrs ago she was tested negative [place]: [place]. Surgery scheduled for Tues: gastric bypass.  Surgeon Dr Sergo Gallegos @ [facility]   No symptoms noted. She sent My Chart results to Dr Gallegos's office just before this call. She has Dr Gallegos's nurse's cell phone number and will call and leave message for her. (Advised to discuss with clinic tomorrow.)  She wants to know what to do next ?    Jaja Parker RN Triage Nurse Advisor 3:10 PM 9/12/2021

## 2021-09-12 NOTE — TELEPHONE ENCOUNTER
"-Coronavirus (COVID-19) Notification    Caller Name (Patient, parent, daughter/son, grandparent, etc)  Patient    Reason for call  Notify of Positive Coronavirus (COVID-19) lab results, assess symptoms,  review  [place] recommendations    Lab Result    Lab test:  2019-nCoV rRt-PCR or SARS-CoV-2 PCR    Oropharyngeal AND/OR nasopharyngeal swabs is POSITIVE for 2019-nCoV RNA/SARS-COV-2 PCR (COVID-19 virus)    RN Recommendations/Instructions per [facility] Coronavirus COVID-19 recommendations    Brief introduction script  Introduce self then review script:  \"I am calling on behalf of studentSN.  We were notified that your Coronavirus test (COVID-19) for was POSITIVE for the virus.  I have some information to relay to you but first I wanted to mention that the [place] Dept of Health will be contacting you shortly [it's possible MD already called Patient] to talk to you more about how you are feeling and other people you have had contact with who might now also have the virus.  Also,  [employer] is Partnering with the [facility] for Covid-19 research, you may be contacted directly by research staff.\"    Assessment (Inquire about Patient's current symptoms)   Assessment   Current Symptoms at time of phone call: (if no symptoms, document No symptoms] None   Symptoms onset (if applicable) NA     If at time of call, Patients symptoms hare worsened, the Patient should contact 911 or have someone drive them to Emergency Dept promptly:      If Patient calling 911, inform 911 personal that you have tested positive for the Coronavirus (COVID-19).  Place mask on and await 911 to arrive.    If Emergency Dept, If possible, please have another adult drive you to the Emergency Dept but you need to wear mask when in contact with other people.      Monoclonal Antibody Administration    You may be eligible to receive a new treatment with a monoclonal antibody for preventing hospitalization in patients at " "high risk for complications from COVID-19.   This medication is still experimental and available on a limited basis; it is given through an IV and must be given at an infusion center. Please note that not all people who are eligible will receive the medication since it is in limited supply.     Are you interested in being considered for this medication?  No.   Does the patient fit the criteria: No    If patient qualifies based on above criteria:  \"You will be contacted if you are selected to receive this treatment in the next 1-2 business days.   This is time sensitive and if you are not selected in the next 1-2 business days, you will not receive the medication.  If you do not receive a call to schedule, you have not been selected.\"      Review information with Patient    Your result was positive. This means you have COVID-19 (coronavirus).  We have sent you a letter that reviews the information that I'll be reviewing with you now.    How can I protect others?    If you have symptoms: stay home and away from others (self-isolate) until:    You've had no fever--and no medicine that reduces fever--for 1 full day (24 hours). And       Your other symptoms have gotten better. For example, your cough or breathing has improved. And     At least 10 days have passed since your symptoms started. (If you've been told by a doctor that you have a weak immune system, wait 20 days.)     If you don't have symptoms: Stay home and away from others (self-isolate) until at least 10 days have passed since your first positive COVID-19 test. (Date test collected)    During this time:    Stay in your own room, including for meals. Use your own bathroom if you can.    Stay away from others in your home. No hugging, kissing or shaking hands. No visitors.     Don't go to work, school or anywhere else.     Clean  high touch  surfaces often (doorknobs, counters, handles, etc.). Use a household cleaning spray or wipes. You'll find a full list " on the EPA website at www.epa.gov/pesticide-registration/list-n-disinfectants-use-against-sars-cov-2.     Cover your mouth and nose with a mask, tissue or other face covering to avoid spreading germs.    Wash your hands and face often with soap and water.    Make a list of people you have been in close contact with recently, even if either of you wore a face covering.   ; Start your list from 2 days before you became ill or had a positive test.  ; Include anyone that was within 6 feet of you for a cumulative total of 15 minutes or more in 24 hours. (Example: if you sat next to Nawaf for 5 minutes in the morning and 10 minutes in the afternoon, then you were in close contact for 15 minutes total that day. Nawaf would be added to your list.)    A public health worker will call or text you. It is important that you answer. They will ask you questions about possible exposures to COVID-19, such as people you have been in direct contact with and places you have visited.    Tell the people on your list that you have COVID-19; they should stay away from others for 14 days starting from the last time they were in contact with you (unless you are told something different from a public health worker).     Caregivers in these groups are at risk for severe illness due to COVID-19:  o People 65 years and older  o People who live in a nursing home or long-term care facility  o People with chronic disease (lung, heart, cancer, diabetes, kidney, liver, immunologic)  o People who have a weakened immune system, including those who:  - Are in cancer treatment  - Take medicine that weakens the immune system, such as corticosteroids  - Had a bone marrow or organ transplant  - Have an immune deficiency  - Have poorly controlled HIV or AIDS  - Are obese (body mass index of 40 or higher)  - Smoke regularly    Caregivers should wear gloves while washing dishes, handling laundry and cleaning bedrooms and bathrooms.    Wash and dry laundry with  special caution. Don't shake dirty laundry, and use the warmest water setting you can.    If you have a weakened immune system, ask your doctor about other actions you should take.    For more tips, go to www.cdc.gov/coronavirus/2019-ncov/downloads/10Things.pdf.    You should not go back to work until you meet the guidelines above for ending your home isolation. You don't need to be retested for COVID-19 before going back to work--studies show that you won't spread the virus if it's been at least 10 days since your symptoms started (or 20 days, if you have a weak immune system).    Employers: This document serves as formal notice of your employee's medical guidelines for going back to work. They must meet the above guidelines before going back to work in person.    How can I take care of myself?    1. Get lots of rest. Drink extra fluids (unless a doctor has told you not to).    2. Take Tylenol (acetaminophen) for fever or pain. If you have liver or kidney problems, ask your family doctor if it's okay to take Tylenol.     Take either:     650 mg (two 325 mg pills) every 4 to 6 hours, or     1,000 mg (two 500 mg pills) every 8 hours as needed.     Note: Don't take more than 3,000 mg in one day. Acetaminophen is found in many medicines (both prescribed and over-the-counter medicines). Read all labels to be sure you don't take too much.    For children, check the Tylenol bottle for the right dose (based on their age or weight).    3. If you have other health problems (like cancer, heart failure, an organ transplant or severe kidney disease): Call your specialty clinic if you don't feel better in the next 2 days.    4. Know when to call 911: Emergency warning signs include:    Trouble breathing or shortness of breath    Pain or pressure in the chest that doesn't go away    Feeling confused like you haven't felt before, or not being able to wake up    Bluish-colored lips or face    5. Sign up for GetWell Loop. We know  it's scary to hear that you have COVID-19. We want to track your symptoms to make sure you're okay over the next 2 weeks. Please look for an email from [employer]--this is a free, online program that we'll use to keep in touch. To sign up, follow the link in the email. Learn more at [email or web address].    Where can I get more information?    [facility]: [email or web address]    Coronavirus Basics: www.health.[email or web address].[place]./diseases/coronavirus/basics.html    What to Do If You're Sick: www.cdc.gov/coronavirus/2019-ncov/about/steps-when-sick.html    Ending Home Isolation: www.cdc.gov/coronavirus/2019-ncov/hcp/disposition-in-home-patients.html     Caring for Someone with COVID-19: www.cdc.gov/coronavirus/2019-ncov/if-you-are-sick/care-for-someone.html     [facility] clinical trials (COVID-19 research studies): clinicalaffairs.[facility]-clinical-trials     A Positive COVID-19 letter will be sent via [place] or the mail. (Exception, no letters sent to Presurgerical/Preprocedure Patients)  Patient instructed to contact her surgeons office for further instruction on rescheduling procedure  Nicole Garcia LPN

## 2021-09-13 ENCOUNTER — TELEPHONE (OUTPATIENT)
Dept: ENDOCRINOLOGY | Facility: CLINIC | Age: 21
End: 2021-09-13

## 2021-09-13 ENCOUNTER — ANESTHESIA EVENT (OUTPATIENT)
Dept: SURGERY | Facility: CLINIC | Age: 21
End: 2021-09-13
Payer: COMMERCIAL

## 2021-09-13 NOTE — TELEPHONE ENCOUNTER
Called [facility] [phone] to change OR date for Rashida-en-Y from 9/14 to 9/24 due to the patient coming down with COVID-19. Transferred to the Medical Director's office and left VM message of change of date. My direct call back and fax number left.

## 2021-09-13 NOTE — TELEPHONE ENCOUNTER
Patient called to discuss diet prior to surgery since the date has been moved out to 9/24. Read her Loraine S's clinic note. Did tell her she should arrive at the hospital on 9/24 at 5:30 a.m. for a 7:30 a.m. start time.

## 2021-09-13 NOTE — TELEPHONE ENCOUNTER
Patient called, scheduled for surgery 9/14. She did a COVID-19 test at the [facility] ([phone]) which came back negative after a prior one done on 9/10 came back positive. Will pass information on to Loraine MARTINES RN, will request a copy of the testing be faxed to [phone]. Loraine will notify Dr Gallegos.

## 2021-09-14 ENCOUNTER — ANESTHESIA (OUTPATIENT)
Dept: SURGERY | Facility: CLINIC | Age: 21
End: 2021-09-14
Payer: COMMERCIAL

## 2021-09-16 ENCOUNTER — TELEPHONE (OUTPATIENT)
Dept: SURGERY | Facility: CLINIC | Age: 21
End: 2021-09-16

## 2021-09-16 NOTE — TELEPHONE ENCOUNTER
Called patient to let her know I rescheduled her 1 week and 1 month post-op appointments. Reminded her to see her primary care to get vitals taken the day before her 1-week post-op appointment with Dr. Gallegos and fax them to the clinic at [phone].

## 2021-09-17 ENCOUNTER — TELEPHONE (OUTPATIENT)
Dept: ENDOCRINOLOGY | Facility: CLINIC | Age: 21
End: 2021-09-17

## 2021-09-17 NOTE — TELEPHONE ENCOUNTER
Follow-up with urology as scheduled  Follow-up with nephrology as scheduled  Consider follow-up with a local doctor as well, and ambulatory referral was placed to a physician and miguelito Birtt until evaluated by Urology Received date of service change request letter dated 9/14/2021 for CPT code 4366, authorization number [id number] effective 9/24/21 - 9/30/21.

## 2021-09-19 ENCOUNTER — HEALTH MAINTENANCE LETTER (OUTPATIENT)
Age: 21
End: 2021-09-19

## 2021-09-23 NOTE — ANESTHESIA PREPROCEDURE EVALUATION
Anesthesia Pre-Procedure Evaluation    Patient: Minerva Finch   MRN: [id number] : 2000        Preoperative Diagnosis: Morbid obesity (H) [E66.01]  Gastric reflux syndrome [K21.9]   Procedure : Procedure(s):  CREATION, GASTRIC BYPASS, LAPAROSCOPIC  HERNIORRHAPHY, HIATAL, LAPAROSCOPIC  Latex free     Past Medical History:   Diagnosis Date     Abdominal pain      ADHD (attention deficit hyperactivity disorder)      Anxiety      Depression      Gastroesophageal reflux disease      Gastroesophageal reflux disease with esophagitis      Intractable vomiting with nausea      Obesity     BMI 37.33 using vitals from 2/3/17     Uncomplicated asthma       Past Surgical History:   Procedure Laterality Date     ENDOSCOPY       ESOPHAGOSCOPY, GASTROSCOPY, DUODENOSCOPY (EGD), COMBINED N/A 2017    Procedure: COMBINED ESOPHAGOSCOPY, GASTROSCOPY, DUODENOSCOPY (EGD), BIOPSY SINGLE OR MULTIPLE;  Surgeon: David Valle MD;  Location: [facility]      ESOPHAGOSCOPY, GASTROSCOPY, DUODENOSCOPY (EGD), COMBINED N/A 2017    Procedure: COMBINED ESOPHAGOSCOPY, GASTROSCOPY, DUODENOSCOPY (EGD), BIOPSY SINGLE OR MULTIPLE;  Upper endoscopy with biopsy;  Surgeon: Yancy Chacko MD;  Location: [facility]      ESOPHAGOSCOPY, GASTROSCOPY, DUODENOSCOPY (EGD), COMBINED N/A 10/28/2020    Procedure: ESOPHAGOGASTRODUODENOSCOPY, WITH BIOPSY;  Surgeon: Juni Callahan DO;  Location: [facility] ESOPH/GAS REFLUX TEST W NASAL IMPED >1 HR N/A 4/3/2017    Procedure: ESOPHAGEAL IMPEDENCE FUNCTION TEST WITH 24 HOUR PH GREATER THAN 1 HOUR;  Surgeon: Yancy Chacko MD;  Location: [facility]      tonsillectomy and adenoidectomy        Allergies   Allergen Reactions     Kiwi Extract Shortness Of Breath and Anaphylaxis     Hives and tongue swells     Levsin [Hyoscyamine] Shortness Of Breath and Anaphylaxis     Baclofen      Vomiting     Omeprazole      Violent vomiting      Social History      Tobacco Use     Smoking status: Passive Smoke Exposure - Never Smoker     Smokeless tobacco: Never Used     Tobacco comment: mom smokes outside   Substance Use Topics     Alcohol use: No     Alcohol/week: 0.0 standard drinks      Wt Readings from Last 1 Encounters:   09/10/21 103.7 kg (228 lb 11.2 oz)        Anesthesia Evaluation   Pt has had prior anesthetic.     No history of anesthetic complications       ROS/MED HX  ENT/Pulmonary:     (+) ALFREDO risk factors, snores loudly, obese, asthma  (-) COPD   Neurologic:       Cardiovascular:       METS/Exercise Tolerance:     Hematologic:  - neg hematologic  ROS   (+) anemia, history of blood transfusion, no previous transfusion reaction, Known PRBC Anitbodies:No     Musculoskeletal:       GI/Hepatic:     (+) GERD, esophageal disease, hiatal hernia, liver disease (fatty liver disease),     Renal/Genitourinary:       Endo:     (+) Obesity,  (-) Type II DM and thyroid disease   Psychiatric/Substance Use:     (+) psychiatric history anxiety and depression     Infectious Disease: Comment: COVID NEGATIVE 9/12/21    COVID-19 Vaccine Completed 05/10/2021, 04/19/2021       Malignancy:       Other:               OUTSIDE LABS:  CBC:   Lab Results   Component Value Date    WBC 8.9 09/10/2021    WBC 6.7 08/13/2021    HGB 13.1 09/10/2021    HGB 11.0 (L) 08/13/2021    HCT 42.5 09/10/2021    HCT 37.7 08/13/2021     09/10/2021     08/13/2021     BMP:   Lab Results   Component Value Date     09/10/2021    POTASSIUM 4.1 09/10/2021    CHLORIDE 109 09/10/2021    CO2 24 09/10/2021    BUN 16 09/10/2021    CR 0.80 09/10/2021    GLC 84 09/10/2021     COAGS: No results found for: PTT, INR, FIBR  POC:   Lab Results   Component Value Date    HCG Negative 10/28/2020     HEPATIC:   Lab Results   Component Value Date    ALBUMIN 3.2 (L) 07/21/2017    PROTTOTAL 6.8 07/21/2017    ALT 27 07/21/2017    AST 15 07/21/2017    ALKPHOS 75 07/21/2017    BILITOTAL 0.2 07/21/2017     OTHER:    Lab Results   Component Value Date    SHEEBA 9.5 09/10/2021       Anesthesia Plan    ASA Status:  3   NPO Status:  ELEVATED Aspiration Risk/Unknown    Anesthesia Type: General.     - Airway: ETT   Induction: RSI.   Maintenance: Balanced.   Techniques and Equipment:     - Lines/Monitors: BIS     Consents         - Extended Intubation/Ventilatory Support Discussed: No.      - Patient is DNR/DNI Status: No         Postoperative Care    Pain management: Multi-modal analgesia.   PONV prophylaxis: Ondansetron (or other 5HT-3), Dexamethasone or Solumedrol, Scopolamine patch     Comments:                Uli Dennis MD

## 2021-09-24 ENCOUNTER — HOSPITAL ENCOUNTER (INPATIENT)
Facility: CLINIC | Age: 21
LOS: 1 days | Discharge: HOME | End: 2021-09-25
Attending: SURGERY | Admitting: SURGERY
Payer: COMMERCIAL

## 2021-09-24 DIAGNOSIS — E66.01 MORBID OBESITY (H): ICD-10-CM

## 2021-09-24 DIAGNOSIS — K21.9 GASTRIC REFLUX SYNDROME: ICD-10-CM

## 2021-09-24 DIAGNOSIS — Z98.84 S/P GASTRIC BYPASS: Primary | ICD-10-CM

## 2021-09-24 LAB — GLUCOSE BLDC GLUCOMTR-MCNC: 92 MG/DL (ref 70–99)

## 2021-09-24 PROCEDURE — 258N000003 HC RX IP 258 OP 636: Performed by: STUDENT IN AN ORGANIZED HEALTH CARE EDUCATION/TRAINING PROGRAM

## 2021-09-24 PROCEDURE — 250N000013 HC RX MED GY IP 250 OP 250 PS 637: Performed by: STUDENT IN AN ORGANIZED HEALTH CARE EDUCATION/TRAINING PROGRAM

## 2021-09-24 PROCEDURE — 43644 LAP GASTRIC BYPASS/ROUX-EN-Y: CPT | Mod: GC | Performed by: SURGERY

## 2021-09-24 PROCEDURE — 999N000141 HC STATISTIC PRE-PROCEDURE NURSING ASSESSMENT: Performed by: SURGERY

## 2021-09-24 PROCEDURE — 250N000009 HC RX 250: Performed by: NURSE ANESTHETIST, CERTIFIED REGISTERED

## 2021-09-24 PROCEDURE — 710N000010 HC RECOVERY PHASE 1, LEVEL 2, PER MIN: Performed by: SURGERY

## 2021-09-24 PROCEDURE — 250N000011 HC RX IP 250 OP 636: Performed by: NURSE ANESTHETIST, CERTIFIED REGISTERED

## 2021-09-24 PROCEDURE — 120N000002 HC R&B MED SURG/OB UMMC

## 2021-09-24 PROCEDURE — 250N000025 HC SEVOFLURANE, PER MIN: Performed by: SURGERY

## 2021-09-24 PROCEDURE — 272N000001 HC OR GENERAL SUPPLY STERILE: Performed by: SURGERY

## 2021-09-24 PROCEDURE — 360N000077 HC SURGERY LEVEL 4, PER MIN: Performed by: SURGERY

## 2021-09-24 PROCEDURE — 258N000003 HC RX IP 258 OP 636: Performed by: NURSE ANESTHETIST, CERTIFIED REGISTERED

## 2021-09-24 PROCEDURE — 250N000013 HC RX MED GY IP 250 OP 250 PS 637: Performed by: NURSE ANESTHETIST, CERTIFIED REGISTERED

## 2021-09-24 PROCEDURE — 0D164ZA BYPASS STOMACH TO JEJUNUM, PERCUTANEOUS ENDOSCOPIC APPROACH: ICD-10-PCS | Performed by: SURGERY

## 2021-09-24 PROCEDURE — 0BQT4ZZ REPAIR DIAPHRAGM, PERCUTANEOUS ENDOSCOPIC APPROACH: ICD-10-PCS | Performed by: SURGERY

## 2021-09-24 PROCEDURE — 250N000011 HC RX IP 250 OP 636: Performed by: SURGERY

## 2021-09-24 PROCEDURE — 250N000011 HC RX IP 250 OP 636: Performed by: STUDENT IN AN ORGANIZED HEALTH CARE EDUCATION/TRAINING PROGRAM

## 2021-09-24 PROCEDURE — 370N000017 HC ANESTHESIA TECHNICAL FEE, PER MIN: Performed by: SURGERY

## 2021-09-24 PROCEDURE — 258N000001 HC RX 258: Performed by: SURGERY

## 2021-09-24 RX ORDER — PROPOFOL 10 MG/ML
INJECTION, EMULSION INTRAVENOUS CONTINUOUS PRN
Status: DISCONTINUED | OUTPATIENT
Start: 2021-09-24 | End: 2021-09-24

## 2021-09-24 RX ORDER — KETOROLAC TROMETHAMINE 15 MG/ML
15 INJECTION, SOLUTION INTRAMUSCULAR; INTRAVENOUS ONCE
Status: COMPLETED | OUTPATIENT
Start: 2021-09-24 | End: 2021-09-24

## 2021-09-24 RX ORDER — ONDANSETRON 2 MG/ML
4 INJECTION INTRAMUSCULAR; INTRAVENOUS EVERY 30 MIN PRN
Status: DISCONTINUED | OUTPATIENT
Start: 2021-09-24 | End: 2021-09-24 | Stop reason: HOSPADM

## 2021-09-24 RX ORDER — ALBUTEROL SULFATE 90 UG/1
AEROSOL, METERED RESPIRATORY (INHALATION) PRN
Status: DISCONTINUED | OUTPATIENT
Start: 2021-09-24 | End: 2021-09-24

## 2021-09-24 RX ORDER — ONDANSETRON 4 MG/1
4 TABLET, ORALLY DISINTEGRATING ORAL EVERY 6 HOURS PRN
Status: DISCONTINUED | OUTPATIENT
Start: 2021-09-24 | End: 2021-09-25 | Stop reason: HOSPADM

## 2021-09-24 RX ORDER — NALOXONE HYDROCHLORIDE 0.4 MG/ML
0.2 INJECTION, SOLUTION INTRAMUSCULAR; INTRAVENOUS; SUBCUTANEOUS
Status: DISCONTINUED | OUTPATIENT
Start: 2021-09-24 | End: 2021-09-25 | Stop reason: HOSPADM

## 2021-09-24 RX ORDER — LIDOCAINE 40 MG/G
CREAM TOPICAL
Status: CANCELLED | OUTPATIENT
Start: 2021-09-24

## 2021-09-24 RX ORDER — SODIUM CHLORIDE, SODIUM LACTATE, POTASSIUM CHLORIDE, CALCIUM CHLORIDE 600; 310; 30; 20 MG/100ML; MG/100ML; MG/100ML; MG/100ML
INJECTION, SOLUTION INTRAVENOUS CONTINUOUS PRN
Status: DISCONTINUED | OUTPATIENT
Start: 2021-09-24 | End: 2021-09-24

## 2021-09-24 RX ORDER — SODIUM CHLORIDE, SODIUM LACTATE, POTASSIUM CHLORIDE, CALCIUM CHLORIDE 600; 310; 30; 20 MG/100ML; MG/100ML; MG/100ML; MG/100ML
INJECTION, SOLUTION INTRAVENOUS CONTINUOUS
Status: DISCONTINUED | OUTPATIENT
Start: 2021-09-24 | End: 2021-09-24 | Stop reason: HOSPADM

## 2021-09-24 RX ORDER — OXYCODONE HYDROCHLORIDE 5 MG/1
5 TABLET ORAL EVERY 4 HOURS PRN
Status: DISCONTINUED | OUTPATIENT
Start: 2021-09-24 | End: 2021-09-24 | Stop reason: HOSPADM

## 2021-09-24 RX ORDER — HYDROXYZINE HYDROCHLORIDE 25 MG/1
25 TABLET, FILM COATED ORAL DAILY PRN
Status: DISCONTINUED | OUTPATIENT
Start: 2021-09-24 | End: 2021-09-25 | Stop reason: HOSPADM

## 2021-09-24 RX ORDER — PROPOFOL 10 MG/ML
INJECTION, EMULSION INTRAVENOUS PRN
Status: DISCONTINUED | OUTPATIENT
Start: 2021-09-24 | End: 2021-09-24

## 2021-09-24 RX ORDER — PROCHLORPERAZINE MALEATE 5 MG
10 TABLET ORAL EVERY 6 HOURS PRN
Status: DISCONTINUED | OUTPATIENT
Start: 2021-09-24 | End: 2021-09-25 | Stop reason: HOSPADM

## 2021-09-24 RX ORDER — FLUOXETINE 10 MG/1
40 TABLET, FILM COATED ORAL DAILY
Status: DISCONTINUED | OUTPATIENT
Start: 2021-09-25 | End: 2021-09-25 | Stop reason: HOSPADM

## 2021-09-24 RX ORDER — CEFAZOLIN SODIUM 1 G/3ML
1 INJECTION, POWDER, FOR SOLUTION INTRAMUSCULAR; INTRAVENOUS SEE ADMIN INSTRUCTIONS
Status: DISCONTINUED | OUTPATIENT
Start: 2021-09-24 | End: 2021-09-24 | Stop reason: HOSPADM

## 2021-09-24 RX ORDER — ONDANSETRON 2 MG/ML
4 INJECTION INTRAMUSCULAR; INTRAVENOUS EVERY 6 HOURS PRN
Status: DISCONTINUED | OUTPATIENT
Start: 2021-09-24 | End: 2021-09-25 | Stop reason: HOSPADM

## 2021-09-24 RX ORDER — ONDANSETRON 4 MG/1
4 TABLET, ORALLY DISINTEGRATING ORAL EVERY 30 MIN PRN
Status: DISCONTINUED | OUTPATIENT
Start: 2021-09-24 | End: 2021-09-24 | Stop reason: HOSPADM

## 2021-09-24 RX ORDER — CEFAZOLIN SODIUM 2 G/100ML
2 INJECTION, SOLUTION INTRAVENOUS
Status: COMPLETED | OUTPATIENT
Start: 2021-09-24 | End: 2021-09-24

## 2021-09-24 RX ORDER — DEXTROSE MONOHYDRATE, SODIUM CHLORIDE, AND POTASSIUM CHLORIDE 50; 1.49; 4.5 G/1000ML; G/1000ML; G/1000ML
INJECTION, SOLUTION INTRAVENOUS CONTINUOUS
Status: DISCONTINUED | OUTPATIENT
Start: 2021-09-24 | End: 2021-09-25 | Stop reason: HOSPADM

## 2021-09-24 RX ORDER — OXYCODONE HYDROCHLORIDE 10 MG/1
10 TABLET ORAL
Status: DISCONTINUED | OUTPATIENT
Start: 2021-09-24 | End: 2021-09-25 | Stop reason: HOSPADM

## 2021-09-24 RX ORDER — NALOXONE HYDROCHLORIDE 0.4 MG/ML
0.4 INJECTION, SOLUTION INTRAMUSCULAR; INTRAVENOUS; SUBCUTANEOUS
Status: DISCONTINUED | OUTPATIENT
Start: 2021-09-24 | End: 2021-09-25 | Stop reason: HOSPADM

## 2021-09-24 RX ORDER — ACETAMINOPHEN 325 MG/1
650 TABLET ORAL EVERY 4 HOURS PRN
Status: DISCONTINUED | OUTPATIENT
Start: 2021-09-24 | End: 2021-09-25 | Stop reason: HOSPADM

## 2021-09-24 RX ORDER — DIPHENHYDRAMINE HCL 25 MG
25 CAPSULE ORAL EVERY 6 HOURS PRN
Status: DISCONTINUED | OUTPATIENT
Start: 2021-09-24 | End: 2021-09-25 | Stop reason: HOSPADM

## 2021-09-24 RX ORDER — FENTANYL CITRATE 50 UG/ML
25 INJECTION, SOLUTION INTRAMUSCULAR; INTRAVENOUS EVERY 5 MIN PRN
Status: DISCONTINUED | OUTPATIENT
Start: 2021-09-24 | End: 2021-09-24 | Stop reason: HOSPADM

## 2021-09-24 RX ORDER — HYDROMORPHONE HCL IN WATER/PF 6 MG/30 ML
0.2 PATIENT CONTROLLED ANALGESIA SYRINGE INTRAVENOUS
Status: DISCONTINUED | OUTPATIENT
Start: 2021-09-24 | End: 2021-09-25 | Stop reason: HOSPADM

## 2021-09-24 RX ORDER — LIDOCAINE HYDROCHLORIDE 20 MG/ML
INJECTION, SOLUTION INFILTRATION; PERINEURAL PRN
Status: DISCONTINUED | OUTPATIENT
Start: 2021-09-24 | End: 2021-09-24

## 2021-09-24 RX ORDER — HYDROMORPHONE HCL IN WATER/PF 6 MG/30 ML
0.2 PATIENT CONTROLLED ANALGESIA SYRINGE INTRAVENOUS EVERY 5 MIN PRN
Status: DISCONTINUED | OUTPATIENT
Start: 2021-09-24 | End: 2021-09-24 | Stop reason: HOSPADM

## 2021-09-24 RX ORDER — FENTANYL CITRATE 50 UG/ML
INJECTION, SOLUTION INTRAMUSCULAR; INTRAVENOUS PRN
Status: DISCONTINUED | OUTPATIENT
Start: 2021-09-24 | End: 2021-09-24

## 2021-09-24 RX ORDER — OXYCODONE HYDROCHLORIDE 5 MG/1
5 TABLET ORAL EVERY 6 HOURS PRN
Qty: 12 TABLET | Refills: 0 | Status: SHIPPED | OUTPATIENT
Start: 2021-09-24 | End: 2021-10-25

## 2021-09-24 RX ORDER — OXYCODONE HYDROCHLORIDE 5 MG/1
5 TABLET ORAL
Status: DISCONTINUED | OUTPATIENT
Start: 2021-09-24 | End: 2021-09-25 | Stop reason: HOSPADM

## 2021-09-24 RX ORDER — DEXAMETHASONE SODIUM PHOSPHATE 4 MG/ML
INJECTION, SOLUTION INTRA-ARTICULAR; INTRALESIONAL; INTRAMUSCULAR; INTRAVENOUS; SOFT TISSUE PRN
Status: DISCONTINUED | OUTPATIENT
Start: 2021-09-24 | End: 2021-09-24

## 2021-09-24 RX ORDER — ONDANSETRON 2 MG/ML
INJECTION INTRAMUSCULAR; INTRAVENOUS PRN
Status: DISCONTINUED | OUTPATIENT
Start: 2021-09-24 | End: 2021-09-24

## 2021-09-24 RX ORDER — SODIUM CHLORIDE, SODIUM LACTATE, POTASSIUM CHLORIDE, CALCIUM CHLORIDE 600; 310; 30; 20 MG/100ML; MG/100ML; MG/100ML; MG/100ML
INJECTION, SOLUTION INTRAVENOUS CONTINUOUS
Status: CANCELLED | OUTPATIENT
Start: 2021-09-24

## 2021-09-24 RX ORDER — LIDOCAINE 40 MG/G
CREAM TOPICAL
Status: DISCONTINUED | OUTPATIENT
Start: 2021-09-24 | End: 2021-09-25 | Stop reason: HOSPADM

## 2021-09-24 RX ORDER — LABETALOL HYDROCHLORIDE 5 MG/ML
5-10 INJECTION, SOLUTION INTRAVENOUS
Status: DISCONTINUED | OUTPATIENT
Start: 2021-09-24 | End: 2021-09-25 | Stop reason: HOSPADM

## 2021-09-24 RX ORDER — AMOXICILLIN 250 MG
2 CAPSULE ORAL 2 TIMES DAILY
Status: DISCONTINUED | OUTPATIENT
Start: 2021-09-24 | End: 2021-09-25 | Stop reason: HOSPADM

## 2021-09-24 RX ORDER — DIPHENHYDRAMINE HYDROCHLORIDE 50 MG/ML
25 INJECTION INTRAMUSCULAR; INTRAVENOUS EVERY 6 HOURS PRN
Status: DISCONTINUED | OUTPATIENT
Start: 2021-09-24 | End: 2021-09-25 | Stop reason: HOSPADM

## 2021-09-24 RX ORDER — HYDRALAZINE HYDROCHLORIDE 20 MG/ML
5 INJECTION INTRAMUSCULAR; INTRAVENOUS ONCE
Status: COMPLETED | OUTPATIENT
Start: 2021-09-24 | End: 2021-09-24

## 2021-09-24 RX ORDER — METHOCARBAMOL 500 MG/1
500 TABLET, FILM COATED ORAL 4 TIMES DAILY PRN
Status: DISCONTINUED | OUTPATIENT
Start: 2021-09-24 | End: 2021-09-25 | Stop reason: HOSPADM

## 2021-09-24 RX ADMIN — SUGAMMADEX 200 MG: 100 INJECTION, SOLUTION INTRAVENOUS at 10:14

## 2021-09-24 RX ADMIN — FENTANYL CITRATE 25 MCG: 50 INJECTION, SOLUTION INTRAMUSCULAR; INTRAVENOUS at 11:08

## 2021-09-24 RX ADMIN — ROCURONIUM BROMIDE 10 MG: 10 INJECTION INTRAVENOUS at 10:03

## 2021-09-24 RX ADMIN — HYDRALAZINE HYDROCHLORIDE 5 MG: 20 INJECTION INTRAMUSCULAR; INTRAVENOUS at 11:53

## 2021-09-24 RX ADMIN — SODIUM CHLORIDE, POTASSIUM CHLORIDE, SODIUM LACTATE AND CALCIUM CHLORIDE: 600; 310; 30; 20 INJECTION, SOLUTION INTRAVENOUS at 07:41

## 2021-09-24 RX ADMIN — POTASSIUM CHLORIDE, DEXTROSE MONOHYDRATE AND SODIUM CHLORIDE: 150; 5; 450 INJECTION, SOLUTION INTRAVENOUS at 11:31

## 2021-09-24 RX ADMIN — Medication 2 G: at 07:50

## 2021-09-24 RX ADMIN — MIDAZOLAM 2 MG: 1 INJECTION INTRAMUSCULAR; INTRAVENOUS at 07:33

## 2021-09-24 RX ADMIN — HYDROMORPHONE HYDROCHLORIDE 0.2 MG: 0.2 INJECTION, SOLUTION INTRAMUSCULAR; INTRAVENOUS; SUBCUTANEOUS at 12:48

## 2021-09-24 RX ADMIN — HYDROMORPHONE HYDROCHLORIDE 0.2 MG: 0.2 INJECTION, SOLUTION INTRAMUSCULAR; INTRAVENOUS; SUBCUTANEOUS at 13:35

## 2021-09-24 RX ADMIN — FENTANYL CITRATE 50 MCG: 50 INJECTION, SOLUTION INTRAMUSCULAR; INTRAVENOUS at 08:18

## 2021-09-24 RX ADMIN — ONDANSETRON 4 MG: 2 INJECTION INTRAMUSCULAR; INTRAVENOUS at 10:15

## 2021-09-24 RX ADMIN — ROCURONIUM BROMIDE 20 MG: 10 INJECTION INTRAVENOUS at 08:16

## 2021-09-24 RX ADMIN — DEXAMETHASONE SODIUM PHOSPHATE 4 MG: 4 INJECTION, SOLUTION INTRA-ARTICULAR; INTRALESIONAL; INTRAMUSCULAR; INTRAVENOUS; SOFT TISSUE at 07:44

## 2021-09-24 RX ADMIN — PROPOFOL 300 MG: 10 INJECTION, EMULSION INTRAVENOUS at 07:44

## 2021-09-24 RX ADMIN — FENTANYL CITRATE 25 MCG: 50 INJECTION, SOLUTION INTRAMUSCULAR; INTRAVENOUS at 12:05

## 2021-09-24 RX ADMIN — SUGAMMADEX 200 MG: 100 INJECTION, SOLUTION INTRAVENOUS at 10:18

## 2021-09-24 RX ADMIN — FENTANYL CITRATE 25 MCG: 50 INJECTION, SOLUTION INTRAMUSCULAR; INTRAVENOUS at 11:23

## 2021-09-24 RX ADMIN — FENTANYL CITRATE 150 MCG: 50 INJECTION, SOLUTION INTRAMUSCULAR; INTRAVENOUS at 07:44

## 2021-09-24 RX ADMIN — FENTANYL CITRATE 50 MCG: 50 INJECTION, SOLUTION INTRAMUSCULAR; INTRAVENOUS at 08:40

## 2021-09-24 RX ADMIN — PHENYLEPHRINE HYDROCHLORIDE 100 MCG: 10 INJECTION INTRAVENOUS at 08:14

## 2021-09-24 RX ADMIN — ENOXAPARIN SODIUM 40 MG: 40 INJECTION SUBCUTANEOUS at 06:15

## 2021-09-24 RX ADMIN — ALBUTEROL SULFATE 6 PUFF: 108 INHALANT RESPIRATORY (INHALATION) at 10:23

## 2021-09-24 RX ADMIN — HYDROMORPHONE HYDROCHLORIDE 0.5 MG: 1 INJECTION, SOLUTION INTRAMUSCULAR; INTRAVENOUS; SUBCUTANEOUS at 09:15

## 2021-09-24 RX ADMIN — SODIUM CHLORIDE, POTASSIUM CHLORIDE, SODIUM LACTATE AND CALCIUM CHLORIDE: 600; 310; 30; 20 INJECTION, SOLUTION INTRAVENOUS at 10:15

## 2021-09-24 RX ADMIN — PROPOFOL 40 MG: 10 INJECTION, EMULSION INTRAVENOUS at 09:21

## 2021-09-24 RX ADMIN — ROCURONIUM BROMIDE 15 MG: 10 INJECTION INTRAVENOUS at 09:37

## 2021-09-24 RX ADMIN — OXYCODONE HYDROCHLORIDE 5 MG: 5 TABLET ORAL at 13:35

## 2021-09-24 RX ADMIN — Medication 100 MG: at 07:44

## 2021-09-24 RX ADMIN — OXYCODONE HYDROCHLORIDE 10 MG: 10 TABLET ORAL at 19:49

## 2021-09-24 RX ADMIN — LIDOCAINE HYDROCHLORIDE 100 MG: 20 INJECTION, SOLUTION INFILTRATION; PERINEURAL at 07:44

## 2021-09-24 RX ADMIN — ROCURONIUM BROMIDE 20 MG: 10 INJECTION INTRAVENOUS at 08:55

## 2021-09-24 RX ADMIN — PROCHLORPERAZINE EDISYLATE 5 MG: 5 INJECTION INTRAMUSCULAR; INTRAVENOUS at 13:52

## 2021-09-24 RX ADMIN — HYDROMORPHONE HYDROCHLORIDE 0.2 MG: 0.2 INJECTION, SOLUTION INTRAMUSCULAR; INTRAVENOUS; SUBCUTANEOUS at 11:35

## 2021-09-24 RX ADMIN — KETOROLAC TROMETHAMINE 15 MG: 15 INJECTION, SOLUTION INTRAMUSCULAR; INTRAVENOUS at 17:30

## 2021-09-24 RX ADMIN — PROPOFOL 30 MCG/KG/MIN: 10 INJECTION, EMULSION INTRAVENOUS at 07:44

## 2021-09-24 RX ADMIN — ONDANSETRON 4 MG: 2 INJECTION INTRAMUSCULAR; INTRAVENOUS at 11:26

## 2021-09-24 RX ADMIN — ROCURONIUM BROMIDE 5 MG: 10 INJECTION INTRAVENOUS at 09:10

## 2021-09-24 RX ADMIN — ROCURONIUM BROMIDE 40 MG: 10 INJECTION INTRAVENOUS at 07:53

## 2021-09-24 ASSESSMENT — ACTIVITIES OF DAILY LIVING (ADL)
CONCENTRATING,_REMEMBERING_OR_MAKING_DECISIONS_DIFFICULTY: NO
DOING_ERRANDS_INDEPENDENTLY_DIFFICULTY: NO
TOILETING_ISSUES: NO
WALKING_OR_CLIMBING_STAIRS_DIFFICULTY: NO
DIFFICULTY_EATING/SWALLOWING: NO
ADLS_ACUITY_SCORE: 3
FALL_HISTORY_WITHIN_LAST_SIX_MONTHS: NO
ADLS_ACUITY_SCORE: 4
DRESSING/BATHING_DIFFICULTY: NO
WEAR_GLASSES_OR_BLIND: YES
DIFFICULTY_COMMUNICATING: NO

## 2021-09-24 ASSESSMENT — COPD QUESTIONNAIRES: COPD: 0

## 2021-09-24 ASSESSMENT — MIFFLIN-ST. JEOR: SCORE: 1726.13

## 2021-09-24 NOTE — ANESTHESIA PROCEDURE NOTES
Airway       Patient location during procedure: OR       Procedure Start/Stop Times: 9/24/2021 7:46 AM  Staff -        Other Anesthesia Staff: Iman Westfall       Performed By: SRNA  Consent for Airway        Urgency: elective  Indications and Patient Condition       Indications for airway management: iris-procedural       Induction type:intravenous       Mask difficulty assessment: 0 - not attempted    Final Airway Details       Final airway type: endotracheal airway       Successful airway: ETT - single and Oral  Endotracheal Airway Details        ETT size (mm): 7.0       Cuffed: yes       Successful intubation technique: direct laryngoscopy       DL Blade Type: King 2       Grade View of Cords: 1       Adjucts: stylet       Position: Right       Measured from: lips       Secured at (cm): 22       Bite block used: None    Post intubation assessment        Placement verified by: capnometry, equal breath sounds and chest rise        Number of attempts at approach: 1       Number of other approaches attempted: 0       Secured with: pink tape       Ease of procedure: easy       Dentition: Lips/oral mucosa injury

## 2021-09-24 NOTE — ANESTHESIA CARE TRANSFER NOTE
Patient: Minerva Finch    Procedure(s):  CREATION, GASTRIC BYPASS, LAPAROSCOPIC  HERNIORRHAPHY, HIATAL, LAPAROSCOPIC  Latex free    Diagnosis: Morbid obesity (H) [E66.01]  Gastric reflux syndrome [K21.9]  Diagnosis Additional Information: No value filed.    Anesthesia Type:   General     Note:    Oropharynx: spontaneously breathing  Level of Consciousness: drowsy  Oxygen Supplementation: face mask  Level of Supplemental Oxygen (L/min / FiO2): 8  Independent Airway: airway patency satisfactory and stable  Dentition: dentition unchanged  Vital Signs Stable: post-procedure vital signs reviewed and stable  Report to RN Given: handoff report given  Patient transferred to: PACU    Handoff Report: Identifed the Patient, Identified the Reponsible Provider, Reviewed the pertinent medical history, Discussed the surgical course, Reviewed Intra-OP anesthesia mangement and issues during anesthesia, Set expectations for post-procedure period and Allowed opportunity for questions and acknowledgement of understanding      Vitals:  Vitals Value Taken Time   /105 09/24/21 1050   Temp     Pulse 98 09/24/21 1052   Resp     SpO2 100 % 09/24/21 1052   Vitals shown include unvalidated device data.    Electronically Signed By: JERARDO Nascimento CRNA  September 24, 2021  10:53 AM

## 2021-09-24 NOTE — ANESTHESIA POSTPROCEDURE EVALUATION
Patient: Minerva Finch    Procedure(s):  CREATION, GASTRIC BYPASS, LAPAROSCOPIC  HERNIORRHAPHY, HIATAL, LAPAROSCOPIC  Latex free    Diagnosis:Morbid obesity (H) [E66.01]  Gastric reflux syndrome [K21.9]  Diagnosis Additional Information: No value filed.    Anesthesia Type:  General    Note:  Disposition: Admission   Postop Pain Control: Uneventful            Sign Out: Well controlled pain   PONV: No   Neuro/Psych: Uneventful            Sign Out: Acceptable/Baseline neuro status   Airway/Respiratory: Uneventful            Sign Out: Acceptable/Baseline resp. status   CV/Hemodynamics: Uneventful            Sign Out: Acceptable CV status; No obvious hypovolemia; No obvious fluid overload   Other NRE: NONE   DID A NON-ROUTINE EVENT OCCUR? No           Last vitals:  Vitals Value Taken Time   /90 09/24/21 1445   Temp 36.4  C (97.6  F) 09/24/21 1300   Pulse 104 09/24/21 1446   Resp 19 09/24/21 1446   SpO2 97 % 09/24/21 1446   Vitals shown include unvalidated device data.    Electronically Signed By: Uli Dennis MD  September 24, 2021  2:47 PM

## 2021-09-24 NOTE — OP NOTE
"[facility]    Operative Note    Pre-operative diagnosis: Morbid obesity (H) [E66.01]  Gastric reflux syndrome [K21.9]   Post-operative diagnosis Same   Procedure: Procedure(s):  CREATION, GASTRIC BYPASS, LAPAROSCOPIC  HERNIORRHAPHY, HIATAL, LAPAROSCOPIC  Latex free   Surgeon: Surgeon(s) and Role:     * Sergo Gallegos MD - Primary     * Mandy Wick MD - Resident - Assisting   Anesthesia: General    Estimated blood loss: 20 ml   Drains: None   Specimens: * No specimens in log *   Findings: Small hiatal hernia.    Complications: None.   Implants: None.     COMORBIDITIES:   Past Medical History:   Diagnosis Date     Abdominal pain      ADHD (attention deficit hyperactivity disorder)      Anxiety      Depression      Gastroesophageal reflux disease      Gastroesophageal reflux disease with esophagitis      Intractable vomiting with nausea      Obesity     BMI 37.33 using vitals from 2/3/17     Uncomplicated asthma        INDICATIONS FOR PROCEDURE  Minerva Finch is a 21 year old female who is morbidly obese.  Numerous weight loss attempts without surgery have been without success.     Height: 160 cm (5' 3\"), Weight: 99.2 kg (218 lb 11.1 oz), and currently the Body mass index is 38.74 kg/m .  After understanding the risks and benefits of proceeding with a Laparoscopic Rashida-en-Y Gastric Bypass, she agreed to an operation as outlined by me.    I reviewed the risks of surgery with Minerva Finch.    These include, but are not limited to, death, myocardial infarction, pneumonia, urinary tract infection, deep venous thrombosis with or without pulmonary embolus, abdominal infection from bowel injury or abscess, bowel obstruction, wound infection, and bleeding.    More specific risks related to gastric bypass were detailed at the bariatric informational seminar and include the followin.) leak at the gastrojejunostomy or jejunojejunostomy anastomosis, 2.) leak " at the gastric remnant staple line, 3.) stricture at the gastrojejunostomy or jejunojejunostomy anastomosis, 4.) nausea, vomiting, and dehydration for several months, 5.) internal hernia or adhesions causing bowel obstruction, 6.) rapid weight loss causing a higher rate of gallstone formation during the first 6 months after surgery, 7.) difficulty accessing the lower stomach and duodenum for the rest of life, 8) decreased absorption of vitamins because of the altered gastric bypass anatomy, 9.) increased risk of peptic ulcer (marginal ulcer), especially for smokers and NSAID users, 10.) risk of low sugars caused by high insulin release especially in patients who do not have diabetes.    Due to the patient's comorbidity conditions of reflux in association with elevated body mass index, bariatric surgery has been recommended and is being performed today.    Moreover, as the surgeon performing this procedure, I certify that the following are true in regards to this patient at or prior to the day of surgery:    1. The patient's body mass index (BMI) is or has been greater than or equal to 35 kg/m2.  2. The patient has at least one co-morbidity related to obesity (as outlined above).  3. The patient has been previously unsuccessful with medical treatment for obesity.  Please note that some of this information has been documented as part of a comprehensive pre-bariatric surgery process in the outpatient clinic and is NOT immediately available in the inpatient encounter for this bariatric operation.          OPERATIVE PROCEDURE: The patient was prepared in routine fashion and under the benefits of general anesthesia, a left upper quadrant Veress needle was inserted and the abdomen was insufflated with carbon dioxide to a maximum pressure of 15 mm Hg. With the patient in reverse Trendelenberg, a total of 5 ports were placed.  A right-sided 5 mm port was used to place a liver retractor and this provided a view of the upper  stomach.     The angle of His was incised with Harmonic scalpel  the left Stefania from the gastric cardia and fundus.  The hepatogastric ligament was divided.    The right stefania was identified.  Dissection was performed along the stefania at the junction of the right and left crura. The crura were completely dissected and the mediastinal fat was identified and a posterior hiatal hernia repair was performed by crural approximation with 2-0 surgidac in figure of eight fashion.    A reinforced Endo CHARLES blue load and two additional blue loads of the Endo CHARLES stapler device were used to create a very small gastric pouch.      The staple line was inspected for hemostasis, and the fat on either side of the staple line was dissected using the Harmonic scalpel. Posterior adhesions were lysed.    With the patient moved to supine position, the ligament of Treitz was then identified, the jejunum was marched 30 cm distally, and a Penrose drain was looped around the bowel at this location. The patient was moved back to reverse Trendelenberg position, and the loop of jejunum was brought up to the gastric pouch in antecolic, antegastric position. A running suture using 2-0 Surgidac on the Endostitch was used to create the back wall of the gastrojejunostomy.     A roticulating 60 mm endo CHARLES white load was then fired on the proximal side of the gastrojejunostomy to separate Rashida limb from distal biliopancreatic limb. The Penrose drain was passed off the operative field, and the mesenteric defect was elongated using traction.      The gastrojejunostomy was completed as follows. Enterotomies were made on both the gastric pouch and the Rashida limb of jejunum, and an Endo CHARLES blue load was inserted to 2 cm and fired. A gastroscope was passed transorally into the Rashida limb, and the enterotomy was oversewn in 2 layers using 3-0 polysorb for the internal layer and 2-0 Surgidac suture as a buttressing anterior layer. A bowel clamp was then  placed on Rashida limb, the patient was moved to supine position, the anastomosis was insufflated under saline, and there was no evidence of bubbling or leaking.      Attention was then turned towards construction of the jejunojejunostomy. The Rashida limb was marched down 70 cm from gastrojejunostomy, and the Rashida and biliopancreatic limbs were tacked together using 2-0 Surgidac. Enterotomies were made on each limb, orientation of the Rashida limb was re-checked at this point, and an Endo CHARLES white load was inserted to 60 mm and fired to create the jejunojejunostomy. The heel of the jejunojejunostomy was secured, and the enterotomy defect was closed using a single white load Endo CHARLES firing.  The anti-obstruction and mesenteric defect stitches were placed in running fashion using 2-0 Surgidac.     Next, the Downing defect was visualized and closed in running fashion using 2-0 Surgidac.     The liver retractor was removed, and ports were removed from the abdomen under direct visualization.     Skin incisions were closed using skin staples, and sterile dressings were placed.     I was present for all critical components of the operation and all needle and sponge counts were correct x2 at the end of the procedure.    Sergo Gallegos MD  Surgery  [phone] (hospital )        Mandy Bowman MD  General Surgery PGY-4

## 2021-09-24 NOTE — PROVIDER NOTIFICATION
Time of notification: 11:50 AM  Provider notified: Dr. Dennis with anesthesia  Patient status: blood pressures elevated  Temp:  [97.6  F (36.4  C)] 97.6  F (36.4  C)  Pulse:  [] 96  Resp:  [19-21] 21  BP: (145-156)/() 153/106  SpO2:  [94 %-98 %] 94 %  Orders received: 5mg hydralazine x1    Will continue to monitor.

## 2021-09-25 VITALS
HEIGHT: 63 IN | DIASTOLIC BLOOD PRESSURE: 87 MMHG | HEART RATE: 92 BPM | SYSTOLIC BLOOD PRESSURE: 158 MMHG | OXYGEN SATURATION: 97 % | RESPIRATION RATE: 16 BRPM | BODY MASS INDEX: 38.75 KG/M2 | WEIGHT: 218.7 LBS | TEMPERATURE: 97.8 F

## 2021-09-25 LAB
CREAT SERPL-MCNC: 0.83 MG/DL (ref 0.52–1.04)
GFR SERPL CREATININE-BSD FRML MDRD: >90 ML/MIN/1.73M2
PLATELET # BLD AUTO: 281 10E3/UL (ref 150–450)

## 2021-09-25 PROCEDURE — 85049 AUTOMATED PLATELET COUNT: CPT | Performed by: SURGERY

## 2021-09-25 PROCEDURE — 250N000013 HC RX MED GY IP 250 OP 250 PS 637: Performed by: STUDENT IN AN ORGANIZED HEALTH CARE EDUCATION/TRAINING PROGRAM

## 2021-09-25 PROCEDURE — 250N000011 HC RX IP 250 OP 636: Performed by: STUDENT IN AN ORGANIZED HEALTH CARE EDUCATION/TRAINING PROGRAM

## 2021-09-25 PROCEDURE — 36415 COLL VENOUS BLD VENIPUNCTURE: CPT | Performed by: SURGERY

## 2021-09-25 PROCEDURE — 82565 ASSAY OF CREATININE: CPT | Performed by: SURGERY

## 2021-09-25 RX ADMIN — METHOCARBAMOL 500 MG: 500 TABLET ORAL at 09:42

## 2021-09-25 RX ADMIN — DOCUSATE SODIUM 50 MG AND SENNOSIDES 8.6 MG 1 TABLET: 8.6; 5 TABLET, FILM COATED ORAL at 09:32

## 2021-09-25 RX ADMIN — OXYCODONE HYDROCHLORIDE 10 MG: 10 TABLET ORAL at 00:45

## 2021-09-25 RX ADMIN — FLUOXETINE HYDROCHLORIDE 40 MG: 10 TABLET, COATED ORAL at 09:30

## 2021-09-25 RX ADMIN — OXYCODONE HYDROCHLORIDE 5 MG: 5 TABLET ORAL at 13:30

## 2021-09-25 RX ADMIN — OXYCODONE HYDROCHLORIDE 5 MG: 5 TABLET ORAL at 13:33

## 2021-09-25 RX ADMIN — OXYCODONE HYDROCHLORIDE 10 MG: 10 TABLET ORAL at 05:49

## 2021-09-25 RX ADMIN — Medication 40 MG: at 09:29

## 2021-09-25 RX ADMIN — ONDANSETRON 4 MG: 4 TABLET, ORALLY DISINTEGRATING ORAL at 12:17

## 2021-09-25 RX ADMIN — OXYCODONE HYDROCHLORIDE 5 MG: 5 TABLET ORAL at 10:35

## 2021-09-25 RX ADMIN — ENOXAPARIN SODIUM 40 MG: 40 INJECTION SUBCUTANEOUS at 09:33

## 2021-09-25 ASSESSMENT — ACTIVITIES OF DAILY LIVING (ADL)
ADLS_ACUITY_SCORE: 4

## 2021-09-25 NOTE — PHARMACY-CONSULT NOTE
Bariatric Consult    Medications evaluated as requested per Bariatric Consult. Patient may swallow tablets/capsules ONLY if less than   inch.  Larger tablets/capsules should be broken, crushed or split.    No medication changes were needed based on the Bariatric Medication Management Policy.    The pharmacist will continue to follow as new medications are ordered.    Kristy Aleman, PharmD, BCPS  Pager: [phone]

## 2021-09-25 NOTE — PLAN OF CARE
Patient reports good pain relief with Oxycodone.Tolerated fluid intake,mild nausea,Zofran given with relief. Steri strips applied per orders,Sites C/D Reviewed discharge meds/instructions. Has no further questions.Ready for discharge

## 2021-09-25 NOTE — PROVIDER NOTIFICATION
"Provider notified \"7B 35-1 DUSTIN Finch. Pt wants to go home with hyosciamine and wants to go home now - pt's full liq tray did not come up - ride is here and anxious to go. [place] [id number]\"    Laura Shaw RN on 9/25/2021 at 1:27 PM    "

## 2021-09-25 NOTE — PLAN OF CARE
"/64 (BP Location: Left arm)   Pulse 116   Temp 97.7  F (36.5  C) (Oral)   Resp 17   Ht 1.6 m (5' 3\")   Wt 99.2 kg (218 lb 11.1 oz)   SpO2 97%   BMI 38.74 kg/m      A&Ox4; calls appropriately. Intermittently hypertensive but within parameters.  On continuous capnography. Currently on 1 L NC. 5 lap sites with staples and covered with primapore, CDI. Currently on bariatric clears. Pt reports pain with swallowing fluids. Pt educated on taking small sips every couple of mins and following the 30 mLs q 30 min diet regime. Intermittently nauseated; currently managed with deep breathing, small sips and sea bands on wrists.  Hypoactive BS, not passing flatus. Voiding adequate amounts. Up with stand by assist. Pt ambulated in hallway X1. Currently has abdominal binder on while ambulating.  Continue to monitor pain and intake.   "

## 2021-09-25 NOTE — PROGRESS NOTES
Surgery note  9/25    No major events. Nausea postop but felt better overnight and this AM. Only took some water. Pain controlled.     Vitals wnl  Abdomen soft, non-tender, non-distended  Incision sites clean, dry    21F POD1 lap RNYGB, hiatal hernia repair for morbid obesity and severe GERD. Doing well this AM     OK to advance to full liquids x 14 days  No carbonated beverages  Pt must be upright for PO intake  PPI  Zofran  Hyocyamine  Oxycodone    If tolerating full liquids by this afternoon, ok to discharge home.     Kehinde Ryder MD  PGY-5 Surgery  On call pager [phone]

## 2021-09-25 NOTE — PLAN OF CARE
"/78 (BP Location: Left arm)   Pulse 94   Temp 97.4  F (36.3  C) (Oral)   Resp 18   Ht 1.6 m (5' 3\")   Wt 99.2 kg (218 lb 11.1 oz)   SpO2 96%   BMI 38.74 kg/m      Status: S/p Lap sleeve gastrectomy  Activity: Ax1 to ambulate in halls.  Neuros: A&Ox4, no deficits noted.  Cardiac: WDL, denies chest pain.  Respiratory: WDL on 1L NC, can be weaned to RA when awake this AM. Denies SOB.  GI/: Not passing gas yet, LBM 9/22. Voids spont with AUOP.  Diet: Tolerating bariatric clears overnight.  Skin/Incisions: WDL ex abd lap sites x5, primapore dressings CDI.  Lines/Drains: R PIV running IVMF at 75.  Pain/Nausea: Pain managed with PRN oxycodone. Denies nausea overnight, sea bands still in place.  New Changes: No acute changes this shift.  Plan: Monitor bowel function and advance diet per orders.    "

## 2021-09-27 ENCOUNTER — PATIENT OUTREACH (OUTPATIENT)
Dept: ENDOCRINOLOGY | Facility: CLINIC | Age: 21
End: 2021-09-27

## 2021-09-27 ENCOUNTER — TELEPHONE (OUTPATIENT)
Dept: ENDOCRINOLOGY | Facility: CLINIC | Age: 21
End: 2021-09-27

## 2021-09-27 NOTE — TELEPHONE ENCOUNTER
Patient called stating she is doing good after her RNY. No GERD symptoms since surgery. She did state she is having a strobing-like feeling in her eye. I told her I would contact Loraine MARTINES RN to have her call Minerva to discuss.

## 2021-09-27 NOTE — DISCHARGE SUMMARY
[facility]  Bariatric Surgery Discharge Summary    Date of Admission: 9/24/2021  Date of Discharge: 9/25/2021   Attending Physician: Sergo Gallegos MD    Admission Diagnosis:  1. Morbid obesity  2. Severe GERD    Discharge Diagnosis:  Same as above    Consultations:  None    Procedures:  Laparoscopic casi-en-y gastric bypass (Dr. Gallegos) - 9/24/2021    Brief HPI:  Minerva Finch is a 21 year old female who has history of morbid obesity and severe GERD. Numerous weight loss attempts without surgery have been without success. Due to the patient's comorbidity conditions of reflux in association with elevated body mass index, bariatric surgery has been recommended. After a discussion of the risks, benefits, and alternatives, the patient elected to proceed with surgery.     Hospital Course:  The patient was admitted and taken to the OR by Dr. Gallegos for laparoscopic casi-en-y gastric bypass which she tolerated well without complications. She recovered uneventfully, remained afebrile and hemodynamically stable throughout hospitalization.  By the day of discharge (POD1), she was tolerating bariatric full liquid diet in good amounts, ambulating, spontaneously voiding, and pain was controlled on oral medication. Her staples were removed and steri strips were placed. She was instructed on post operative care, monitoring and follow up and was discharged to home.       Meds:     Review of your medicines      START taking      Dose / Directions   oxyCODONE 5 MG tablet  Commonly known as: ROXICODONE  Used for: S/P gastric bypass      Dose: 5 mg  Take 1 tablet (5 mg) by mouth every 6 hours as needed for moderate to severe pain  Quantity: 12 tablet  Refills: 0        CONTINUE these medicines which may have CHANGED, or have new prescriptions. If we are uncertain of the size of tablets/capsules you have at home, strength may be listed as something that might have changed.      Dose / Directions    RABEprazole 20 MG EC tablet  Commonly known as: ACIPHEX  This may have changed:     how much to take    how to take this    when to take this  Used for: Gastroesophageal reflux disease with esophagitis, Erosive esophagitis      Take 20 mg twice daily, taken 30-60 minutes prior to meals  Quantity: 60 tablet  Refills: 3        CONTINUE these medicines which have NOT CHANGED      Dose / Directions   FLUoxetine 40 MG capsule  Commonly known as: PROzac      Dose: 40 mg  Take 40 mg by mouth every morning  Refills: 0     hydrOXYzine 25 MG tablet  Commonly known as: ATARAX      Dose: 25 mg  Take 25 mg by mouth daily as needed  Refills: 0     medroxyPROGESTERone 150 MG/ML IM injection  Commonly known as: DEPO-PROVERA      Dose: 150 mg  Inject 150 mg into the muscle every 3 months  Refills: 0     methocarbamol 500 MG tablet  Commonly known as: ROBAXIN  Used for: Obesity (BMI 30-39.9)      Dose: 500 mg  Take 1 tablet (500 mg) by mouth 4 times daily as needed for muscle spasms  Quantity: 15 tablet  Refills: 0     ondansetron 4 MG ODT tab  Commonly known as: ZOFRAN-ODT  Used for: Obesity (BMI 30-39.9)      Dose: 4 mg  Take 1 tablet (4 mg) by mouth every 8 hours as needed for nausea  Quantity: 15 tablet  Refills: 0     senna-docusate 8.6-50 MG tablet  Commonly known as: SENOKOT-S/PERICOLACE  Used for: Obesity (BMI 30-39.9)      Dose: 2 tablet  Take 2 tablets by mouth daily as needed for constipation (While taking narcotic pain medications.  Stop taking if having loose stools.)  Quantity: 30 tablet  Refills: 1        STOP taking    calcium carbonate 500 MG chewable tablet  Commonly known as: TUMS        docusate sodium 50 MG capsule  Commonly known as: COLACE        Ferrous Gluconate 324 (37.5 Fe) MG Tabs        ondansetron 4 MG tablet  Commonly known as: ZOFRAN              Where to get your medicines      Some of these will need a paper prescription and others can be bought over the counter. Ask your nurse if you have  questions.    Bring a paper prescription for each of these medications    oxyCODONE 5 MG tablet         Additional instructions:     Reason for your hospital stay    Laparoscopic casi-en-y gastric bypass     Activity    Your activity upon discharge: activity as tolerated and no heavy lifting > 20 lbs for 4 weeks     Discharge Instructions    After Bariatric Surgery Discharge Instructions  [facility] Comprehensive Weight Management     Note: Ask your nurse to order your medications from the pharmacy. Be sure you have your medications with you when you leave.  Diet on discharge: bariatric full liquid diet     How much fluid should I drink?  Strive for 48-64 ounces daily.  Carry a water bottle with you without a straw or sports top. Drink from it often.  Keep track of how much fluid you drink in a day.  Remember:  -Do not use straws, chew gum or suck on hard candies. They may cause painful gas.    -Sip, don't slurp when you drink.    -Practice small sips using a medicine cup for the first week postop.   -No ice or cold drinks. This could cause gas or spasms.   -No coffee, soda pop or drinks with caffeine. These may cause stomach pain.   -No alcohol. It is bad for your liver and will cause stomach pain.    How often should I do my deep breathing and coughing?  Use your incentive spirometer (small plastic breathing device) every hour while awake after you get home. Using the incentive spirometer helps you deep breath. Continuing to cough and deep breath will help prevent fevers and pneumonia.   If you do not have a fever after one week, you can stop using the incentive spirometer and discard it.     You can continue to take deep breaths without the incentive spirometer every one to two hours while awake for the month after surgery.    What kinds of activity can I do?  Get plenty of rest the first few days after surgery and try to balance rest and activity. You will need some time to recover - you may be more tired  than you realize at first. You'll feel better and heal faster if you take good care of yourself.  For 4 weeks after surgery (Please review restrictions at your one week visit, they could change based on how well you are doing):  -Don't lift more than 20 pounds.   -Take 4-5 short walks every day.  -Don't jog, run, or do belly exercises.  Don't swim, bathe or use a hot tub until your cuts are healed (scabs are gone).  You may shower  Don't plan to fly or take a road trip within the first 1 to 3 months after surgery.  You could get a blood clot in your legs. If you must travel, get up and move around every hour for at least 5 minutes before continuing on your journey.  Your care team can help you decide when it's safe for you to travel.     What can I do for pain control?  You had major belly surgery that involves all layers of your belly muscles. Pain is expected, even for some as far out as 6-8 weeks after surgery. Moving, sneezing, coughing, and breathing will cause discomfort because these activities use your belly muscles.   Please see your after visit summary medication review for what pain medication will be continued, discontinued and newly started for you.    You can take opioid pain medicine if prescribed and if needed. Try to wean off from it as soon as you feel comfortable.   Do not drive while you are taking opioid pain medicine. This is dangerous.  You can take acetaminophen (Tylenol) between your prescribed pain medicine on a scheduled basis OR take it scheduled every 6 hours (check with your care team for specifics).  -Acetaminophen formulation options:    -Liquid   -Caplet (Cut caplet in half before taking)   -Do not take more than 3000 mg Tylenol in a 24 hour period.  -You may also take Tylenol for pain in place of the opioid pain medicine (check with your care team for specifics).   You can apply ice or heat to the affected area(s). Just remember to wrap the ice in something and limit icing sessions  to 20 minutes. Excessive icing can irritate the skin or cause skin damage.  You can apply heat with a hot, wet towel or heating pad. Just like cold therapy, limit heat application to 20 minutes. Never sleep with a heating pad on. It could cause severe burns to your skin.  Wear your binder to support your belly muscles if you have one.  Take this off a little more each day and try to be off completely by 2 weeks after surgery. If you don't need your binder for comfort or support, you don't need to wear it.   You may not be able to sleep in a comfortable position for a few weeks after surgery. This is normal. You may be more comfortable sleeping in a recliner or propped up with 3 pillows for the first couple of weeks after surgery.  Do not take NSAID's (Non-Steroidal Anti-Inflammatory Drugs) (examples: ibuprofen, Motrin, Advil, Aleve, and Naproxen), aspirin, or use pain patches with NSAID's. They will increase your risk of bleeding or getting an ulcer.    Please call the clinic for any of the following pain concerns, we would like to talk to you:  -pain that does not improve with rest  -pain that gets worse and worse  -pain that is not controlled by your pain medicine  -a sudden severe increase in pain    What medications will I need to take after surgery?  You may be discharged with the following types of medications: omeprazole 20 mg once daily for heartburn symptom prevention, zofran as needed for nausea and a medication called hyoscyamine (levsin) as needed for cramping.Please see your after visit summary medication review for what will be continued, discontinued and newly started.  It is important to reduce the amount of acid in your new stomach for a couple of months after surgery while it is still healing. We will prescribe an acid reducer or antacid. Take it as directed. This will help prevent ulcers, heartburn and acid reflux.  If you took an acid reducer before you had surgery, your care team will let you  know what acid reducer you will take after surgery.   It is okay to swallow any medications smaller than   inch in size.   -If it is larger than   inch, it may need to be cut, crushed, or in a liquid form. Check with your care team about which way is most appropriate for you and your medications.    What should I know about my incisions (cuts)?  Your incisions are covered with white steri strips or butterfly tape.  Leave your steri strips on until they fall off on their own. If the steri strips don't fall off after 1 to 2 weeks, you can take them off. If they fall off earlier, replace them with clean band aids trying to avoid touching the incision itself.   You may shower in the hospital after surgery and can get your incision coverings wet.  Do not submerge in water (e.g. No baths, swimming pools, hot tubs) until your care team tells you it is okay and your incisions are completely healed.    Call the clinic if you have any of these signs or symptoms of infection:  -Redness around the site.  -Drainage that smells bad.  -Drainage that is thick yellow or green.  -An increase in pain around the incision site  -An increase in swelling around the incision site  -Heat or warmth around incision site   -Fever of 101.5  F (38.3  C) or higher when taken under the tongue.   -Chills    Will my urine or bowel movements change?   Your first bowel movements (stools) will likely be liquid. You may also notice old blood or a darker color (black or maroon color) in your bowel movements.  This is not unusual and usually goes away after the first week, if not sooner. You may not have a bowel movement for a week.   If you have not had a bowel movement for at least three days after your surgery date and are passing gas, you can use over the counter stool softeners.  Please stop taking the stool softeners and laxatives if your stools are loose.  Increasing fluids and activity as well as getting off narcotics will help prevent  "constipation.    Call the clinic if:   -You have stomach pain.   -You continue to have constipation.  -You have excessive bloating after walking and passing gas.    How can I prevent dehydration if I feel nauseated (sick to my stomach) and vomit (throw up)?   Vomiting is not normal after surgery. If you continue to have nausea and vomiting, call the clinic.   Nausea can be a sign of dehydration. That is why it is very important to track your fluids.  Do not nap more than one hour during the day. Set a timer to wake yourself up, if needed. Too much sleep will keep you from drinking enough fluid during the day and lead to dehydration.  No outside activity in hot, humid weather until you can drink 48 to 64 ounces of fluid in 24 hours. If you sweat a lot, your body may lose too much water.  Try to take a 1 ounce sip of water (one medicine cup) every 15 minutes.  Set a timer to remind yourself.    Call the clinic if you have any of these signs or symptoms of dehydration:  -Dark colored urine  -Urinating (pass water) less than 2-3 times per day  -Lack of energy  -Nausea  -Dizziness  -Headache    Call the clinic ANY TIME at [phone] if:  -Your pain medicine is not working.  -You have a fever = 101.5 F.  -You have belly or left shoulder pain that gets worse and worse.  -You have a swollen leg with redness, warmth, or pain behind the knee or calf.  -You have chest pain   -You feel very short of breath.  -You have a sudden severe increase in heart rate.  -You have vomiting that gets worse and worse.  -You have constant nausea (feeling sick to your stomach) that does not go away with medication.  -You have trouble swallowing.  -You have an increasing feeling that \"something is not right\".  -You have hiccups that do not stop.  -You have any questions or concerns.    AFTER HOURS QUESTIONS OR CONCERNS: Call [phone] and ask to speak with surgery resident if you are having troubles in the evenings, at night, or on " weekends. Please call if you experience increasing abdominal pain, nausea, vomiting, increasing drainage from your wounds, chills, or fever >101.5    If you have to go to the Emergency Room, we prefer you go to the hospital that did your surgery. Please let them know that you had bariatric surgery and to notify your surgeon.    When should I go back to the clinic?  Follow up with your care team in 1-2 weeks.   If this appointment was not already made, please call: [phone]    Appointments located at [facility] clinic:  [facility]    [address]  [place] [address]     Adult [facility] Follow-up and recommended labs and tests    Follow up in clinic in 1-2 weeks after surgery with bariatric team (Aida Zacarias PA-C or Kaya Richmond CNP)    Appointments on [facility] and/or [facility] (with [facility] or [facility] provider or service). Call [phone] if you haven't heard regarding these appointments within 7 days of discharge.     Diet    Follow this diet upon discharge: bariatric full liquid diet       Discussed with Dr. Gallegos.   - - - - - - - - - - - - - - - - - -  Mandy Bowman MD  General Surgery PGY-4  [id number]

## 2021-09-27 NOTE — PROGRESS NOTES
RN Post-Op/Post-Discharge Care Coordination Note    Ms. Minerva Finch is a 21 year old female who underwent laparoscopic gastric bypass on 9/24/21 with  Dr. John Gallegos.  Spoke with Patient.    Support  Patient able to care for self independently     Health Status  Fevers/chills: Patient reports fever measuring 99.5 degrees Farenheit at home.  Patient also reports chills.    Nausea/Vomiting: Patient denies nausea/vomiting.    Eating/drinking: Tolerating full liquid diet. Reminded to drink small sips slowly.  Denies heartburn symptoms.    Fluid Ounces per day: 47 ounces yesterday.  Has been consistent with getting fluids in.    Bowel habits: Patient reports no bowel movement since surgery. Is passing gas.    Activity:standing, sitting, walking and ambulating stairs    Breathing: Reminded patient to use incentive spirometer- 5 to 10 deep breaths each hour while awake.    Other symptoms: Tachycardia: no, Dizziness/lightheadedness: no, Shortness of breath, dyspnea with exertion, leg pain/swelling: no.      Drains (TOBIAS): N/A    Incisions: Patient denies any signs and symptoms of infection..  Wound closure:  Steri-strips    Pain: Rates pain a 4 on a 10 Scale.  IAlternating Narcotic Analgesic with Extra Strength Tylenol.  Encouraged non-medication management modalities: Ice packs, with barrier, to incisions    New Medications:  Levsin, Zofran, Oxycodone, Tylenol, Senna, home medications, patient was reminded not to take NSAIDS and remind patient not to take NSAIDS    Activity/Restrictions  No lifting in excess of 20 pounds for 4 weeks    Pathology reviewed with patient:  N/A    Forms/Letters  No. All forms should be faxed to [phone]. Going back to [place] next week.  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    Additional Questions: All of her questions were answered including reviewing diet, restrictions, and wound care.  She will call this office if she has any further questions and/or concerns.      Post op  appointment on September 30 at 10 AM confirmed with the patient:  Yes.    Whom and When to Call  Nurses: [phone]  Fax: [phone]     Patient advised if any concerns outside of clinic hours, to call hospital at [phone] and ask to speak to on-call bariatric resident. They should present to ED at [facility] to be assessed if urgency arises.      Risk for:  urinary tract infection, pneumonia, leg clots (deep vein thrombosis), lung clots (pulmonary emboli), injury to the bowels or other organs, bowel obstruction, hernia, and gastrointestinal bleeding.      Weight loss surgery side effects:  abdominal pain, cramping, bloating, difficulty swallowing, constipation, nausea, vomiting, diarrhea, frequent loose stools, dehydration, hair loss, excess skin, protein, iron and vitamin deficiencies, malnutrition, fatigue, and heartburn.    Bariatric surgery risks may include:  an internal leak at the staple line, narrowing of the esophagus, stomach or intestines (strictures), gallstones, bowel obstruction, inflammation of the esophagus or stomach, ulcers with or without bleeding, injuries to other organs, hernias, and iron deficiency.      Rashida-en-Y gastric bypass side effects:  dumping syndrome, hypoglycemia, lactose intolerance, and constipation.  Risks include:  internal leak at the staple line; narrowing of new stomach or esophagus (stricture); nausea; vomiting; dehydration for several months; hernia; bowel obstruction;  gallstones during the first 6 months from rapid weight loss; difficulty accessing the lower stomach and duodenum for the rest of life (important if you develop gallbladder problems) decreased absorption of vitamins and minerals; peptic ulcer (especially for smokers and NSAID users); low sugars (hypoglycemia) from high insulin release (especially in patients who do not have diabetes); and anemia (due to low iron absorption).    C/O strobe effect in right peripheral vision of right eye,  independent of where she is looking.  After the duration of the call, she stated she wasn't noticing it anymore once she was up and walking around.  She was instructed to contact the office if she has continued issues.

## 2021-09-29 ENCOUNTER — VIRTUAL VISIT (OUTPATIENT)
Dept: ENDOCRINOLOGY | Facility: CLINIC | Age: 21
End: 2021-09-29
Payer: COMMERCIAL

## 2021-09-29 DIAGNOSIS — Z71.3 NUTRITIONAL COUNSELING: Primary | ICD-10-CM

## 2021-09-29 DIAGNOSIS — Z98.84 S/P GASTRIC BYPASS: ICD-10-CM

## 2021-09-29 DIAGNOSIS — E66.9 OBESITY (BMI 30-39.9): ICD-10-CM

## 2021-09-29 PROCEDURE — 97803 MED NUTRITION INDIV SUBSEQ: CPT | Mod: 95 | Performed by: DIETITIAN, REGISTERED

## 2021-09-29 NOTE — PROGRESS NOTES
"Minerva Finch is a 21 year old female who is being evaluated via a billable video visit.      The patient has been notified of following:     \"This video visit will be conducted via a call between you and your physician/provider. We have found that certain health care needs can be provided without the need for an in-person physical exam.  This service lets us provide the care you need with a video conversation.  If a prescription is necessary we can send it directly to your pharmacy.  If lab work is needed we can place an order for that and you can then stop by our lab to have the test done at a later time.    Video visits are billed at different rates depending on your insurance coverage.  Please reach out to your insurance provider with any questions.    If during the course of the call the physician/provider feels a video visit is not appropriate, you will not be charged for this service.\"    Patient has given verbal consent for Video visit? yes  How would you like to obtain your AVS? MyChart  If you are dropped from the video visit, the video invite should be resent to: [phone]  Will anyone else be joining your video visit? No  {If patient encounters technical issues they should call [phone]      Video-Visit Details    Type of service:  Video Visit     Video Start Time: 1:29 PM  Video End Time: 2:02 PM    Originating Location (pt. Location): Home     Distant Location (provider location):  [facility] [place]      Platform used for Video Visit: MongoSluice    During this virtual visit the patient is located in [place], patient verifies this as the location during the entirety of this visit.     Nutrition Assessment  Reason For Visit:  Minerva Finch is a 21 year old female presenting today for nutrition follow-up, 1 week s/p RNYGB with Dr Gallegos on 9/24/21.    Patient referred by Dr. Gallegos on 9/24/21.     Anthropometrics  Initial Consult Weight 8/6/2020: 215 lbs  *Pt gained " "weight prior to surgery with a weight of 229 lbs reported on 5/27/21 (BMI 40.57)   Pt seen on site by GAVIN Baron on 9/10/20 with weight of 227, reported weight of 215 on her home scale 9/7/21.     Day of Surgery Weight: 218 lbs with BMI 38.74     Current Weight: 201 lbs, BMI = 35.6    Estimated body mass index is 38.74 kg/m  as calculated from the following:    Height as of 9/24/21: 1.6 m (5' 3\").    Weight as of 9/24/21: 99.2 kg (218 lb 11.1 oz).      Current Vitamins/Minerals:   Got [place] chewable MVI with iron 10 per pill per pt, has been taking 2x/day  - Vit A 400 mcg, pill   - Copper .44 mg per pill    *Will need to add iron, B12 - prefers injection, ca citrate   *Aware of need to separate Ca and iron by 2 hrs    Hx of anemia - has had blood transfusions. Will plan to increase to 60 mg iron/day with additional iron supplementation. Recent platelet count in hospital day after surgery was normal    Nutrition History  Pt reports consuming and tolerating bariatric clear and low-fat full liquid diets. Fluid intake appears adequate, consuming 48-64 oz/day.    Feeling good - no N/V.   1 BM so far.     Bought little containers and a food scale.    Daily intake: 2 24 oz water bottles, 12 oz body armor drink, 1 premier protein per day (30 grams)     Plan to swap body armor drink for another premier protein to help ensure 60 grams protein/day.    Reviewed puree diet per pts request.  Reviewed supplement needs/plan going forward    Nutrition Prescription:  Grams Protein: 60 (minimum)  Amount of Fluid: 48-64 oz    Nutrition Diagnosis  Food and nutrition-related knowledge deficit r/t lack of prior exposure to diet advancements beyond bariatric low-fat full liquid diet aeb pt unable to verbalize understanding of bariatric pureed and soft diets.    Intervention  Intervention At Appointment:  Materials/education provided on bariatric pureed and soft diets, protein intake, fluid intake, eating pace, portion control, avoiding " excess sugar and fat, recommended vitamin/mineral supplements. Patient demonstrates understanding.     Expected Engagement: good    Goals:  1) Follow bariatric diet progression below.  2) Work towards 60+ gm protein/day.  3) Consume 48-64 oz fluids daily- between meals only once on puree diet  4) Eat slowly (>20 min/meal), chewing well to smooth consistency once on the bariatric soft diet.  5) Limit portions to 1/4 to 1/2 cup/meal.  6) Start chewable/liquid multivitamin/minerals twice daily.    -Take the following after a Rashida-en-y Gastric Bypass:    Multivitamin/minerals: adult dose 2 times daily    Iron: 45-60 mg elemental daily  - may partly or fully be covered in multivitamin     Calcium Citrate containing vitamin D: 500 mg 3 times daily or 600 mg 2 times daily    Vitamin B12: sublingual form of at least 500 mcg daily or injection of 1000 mcg monthly     Post-op Diet Advancement Schedule:  Pureed Diet (stage 3): 10/7 - 10/20  Soft Diet (stage 4): 10/21 - 11/17  Regular Diet (stage 5): 11/18    Post-op Diet Handouts:  Diet Guidelines after Weight-loss Surgery  [email or web address]     Your Stage 1 Diet: Clear Liquids  [email or web address]     Your Stage 2 Diet: Low-fat Full Liquids  [email or web address]     Your Stage 3 Diet: Pureed Foods  [email or web address]     Pureed Recipes  [email or web address]    Your Stage 4 Diet: Soft Foods  [email or web address]    Your Stage 5 Diet: Regular Foods  [email or web address]    Supplements after Weight Loss Surgery  [email or web address]     Keeping Track of Fluids  [email or web address]    Exercise Guidelines after Weight Loss Surgery (1st 4-6 weeks)  [email or web address]      Follow-Up: 3 weeks or prn    Time spent with patient: 33 minutes.  Nolvia Gonzalez RD, GURPREET

## 2021-09-29 NOTE — PATIENT INSTRUCTIONS
Goals:  1) Follow bariatric diet progression below.  2) Work towards 60+ gm protein/day.  3) Consume 48-64 oz fluids daily- between meals only once on puree diet  4) Eat slowly (>20 min/meal), chewing well to smooth consistency once on the bariatric soft diet.  5) Limit portions to 1/4 to 1/2 cup/meal.  6) Start chewable/liquid multivitamin/minerals twice daily.    -Take the following after a Rashida-en-y Gastric Bypass:    Multivitamin/minerals: adult dose 2 times daily    Iron: 45-60 mg elemental daily  - may partly or fully be covered in multivitamin     Calcium Citrate containing vitamin D: 500 mg 3 times daily or 600 mg 2 times daily    Vitamin B12: sublingual form of at least 500 mcg daily or injection of 1000 mcg monthly     Post-op Diet Advancement Schedule:  Pureed Diet (stage 3): 10/7 - 10/20  Soft Diet (stage 4): 10/21 - 11/17  Regular Diet (stage 5): 11/18    Post-op Diet Handouts:  Diet Guidelines after Weight-loss Surgery  [email or web address]     Your Stage 1 Diet: Clear Liquids  [email or web address]     Your Stage 2 Diet: Low-fat Full Liquids  [email or web address]     Your Stage 3 Diet: Pureed Foods  [email or web address]     Pureed Recipes  [email or web address]    Your Stage 4 Diet: Soft Foods  [email or web address]    Your Stage 5 Diet: Regular Foods  [email or web address]    Supplements after Weight Loss Surgery  [email or web address]     Keeping Track of Fluids  [email or web address]    Exercise Guidelines after Weight Loss Surgery (1st 4-6 weeks)  [email or web address]

## 2021-09-29 NOTE — LETTER
"9/29/2021       RE: Minerva Finch  [address] [facility] [address]     Dear Colleague,    Thank you for referring your patient, Minerva Finch, to the [facility] [place] at [facility]. Please see a copy of my visit note below.    Minerva Finch is a 21 year old female who is being evaluated via a billable video visit.      The patient has been notified of following:     \"This video visit will be conducted via a call between you and your physician/provider. We have found that certain health care needs can be provided without the need for an in-person physical exam.  This service lets us provide the care you need with a video conversation.  If a prescription is necessary we can send it directly to your pharmacy.  If lab work is needed we can place an order for that and you can then stop by our lab to have the test done at a later time.    Video visits are billed at different rates depending on your insurance coverage.  Please reach out to your insurance provider with any questions.    If during the course of the call the physician/provider feels a video visit is not appropriate, you will not be charged for this service.\"    Patient has given verbal consent for Video visit? yes  How would you like to obtain your AVS? MyChart  If you are dropped from the video visit, the video invite should be resent to: [phone]  Will anyone else be joining your video visit? No  {If patient encounters technical issues they should call [phone]      Video-Visit Details    Type of service:  Video Visit     Video Start Time: 1:29 PM  Video End Time: 2:02 PM    Originating Location (pt. Location): Home     Distant Location (provider location):  [facility] [place]      Platform used for Video Visit: CNG-One    During this virtual visit the patient is located in [place], patient verifies this as the " "location during the entirety of this visit.     Nutrition Assessment  Reason For Visit:  Minerva Finch is a 21 year old female presenting today for nutrition follow-up, 1 week s/p RNYGB with Dr Gallegos on 9/24/21.    Patient referred by Dr. Gallegos on 9/24/21.     Anthropometrics  Initial Consult Weight 8/6/2020: 215 lbs  *Pt gained weight prior to surgery with a weight of 229 lbs reported on 5/27/21 (BMI 40.57)   Pt seen on site by GAVIN Baron on 9/10/20 with weight of 227, reported weight of 215 on her home scale 9/7/21.     Day of Surgery Weight: 218 lbs with BMI 38.74     Current Weight: 201 lbs, BMI = 35.6    Estimated body mass index is 38.74 kg/m  as calculated from the following:    Height as of 9/24/21: 1.6 m (5' 3\").    Weight as of 9/24/21: 99.2 kg (218 lb 11.1 oz).      Current Vitamins/Minerals:   Got [place] chewable MVI with iron 10 per pill per pt, has been taking 2x/day  - Vit A 400 mcg, pill   - Copper .44 mg per pill    *Will need to add iron, B12 - prefers injection, ca citrate   *Aware of need to separate Ca and iron by 2 hrs    Hx of anemia - has had blood transfusions. Will plan to increase to 60 mg iron/day with additional iron supplementation. Recent platelet count in hospital day after surgery was normal    Nutrition History  Pt reports consuming and tolerating bariatric clear and low-fat full liquid diets. Fluid intake appears adequate, consuming 48-64 oz/day.    Feeling good - no N/V.   1 BM so far.     Bought little containers and a food scale.    Daily intake: 2 24 oz water bottles, 12 oz body armor drink, 1 premier protein per day (30 grams)     Plan to swap body armor drink for another premier protein to help ensure 60 grams protein/day.    Reviewed puree diet per pts request.  Reviewed supplement needs/plan going forward    Nutrition Prescription:  Grams Protein: 60 (minimum)  Amount of Fluid: 48-64 oz    Nutrition Diagnosis  Food and nutrition-related knowledge deficit r/t lack of " prior exposure to diet advancements beyond bariatric low-fat full liquid diet aeb pt unable to verbalize understanding of bariatric pureed and soft diets.    Intervention  Intervention At Appointment:  Materials/education provided on bariatric pureed and soft diets, protein intake, fluid intake, eating pace, portion control, avoiding excess sugar and fat, recommended vitamin/mineral supplements. Patient demonstrates understanding.     Expected Engagement: good    Goals:  1) Follow bariatric diet progression below.  2) Work towards 60+ gm protein/day.  3) Consume 48-64 oz fluids daily- between meals only once on puree diet  4) Eat slowly (>20 min/meal), chewing well to smooth consistency once on the bariatric soft diet.  5) Limit portions to 1/4 to 1/2 cup/meal.  6) Start chewable/liquid multivitamin/minerals twice daily.    -Take the following after a Rashida-en-y Gastric Bypass:    Multivitamin/minerals: adult dose 2 times daily    Iron: 45-60 mg elemental daily  - may partly or fully be covered in multivitamin     Calcium Citrate containing vitamin D: 500 mg 3 times daily or 600 mg 2 times daily    Vitamin B12: sublingual form of at least 500 mcg daily or injection of 1000 mcg monthly     Post-op Diet Advancement Schedule:  Pureed Diet (stage 3): 10/7 - 10/20  Soft Diet (stage 4): 10/21 - 11/17  Regular Diet (stage 5): 11/18    Post-op Diet Handouts:  Diet Guidelines after Weight-loss Surgery  [email or web address]     Your Stage 1 Diet: Clear Liquids  [email or web address]     Your Stage 2 Diet: Low-fat Full Liquids  [email or web address]     Your Stage 3 Diet: Pureed Foods  [email or web address]     Pureed Recipes  [email or web address]    Your Stage 4 Diet: Soft Foods  [email or web address]    Your Stage 5 Diet: Regular Foods  [email or web address]    Supplements after Weight Loss Surgery  [email or web address]     Keeping Track of  Fluids  [email or web address]    Exercise Guidelines after Weight Loss Surgery (1st 4-6 weeks)  [email or web address]      Follow-Up: 3 weeks or prn    Time spent with patient: 33 minutes.  Nolvia Gonzalez RD, LD

## 2021-09-30 ENCOUNTER — VIRTUAL VISIT (OUTPATIENT)
Dept: ENDOCRINOLOGY | Facility: CLINIC | Age: 21
End: 2021-09-30
Payer: COMMERCIAL

## 2021-09-30 VITALS — HEIGHT: 63 IN | BODY MASS INDEX: 37.21 KG/M2 | WEIGHT: 210 LBS

## 2021-09-30 DIAGNOSIS — E66.9 OBESITY (BMI 30-39.9): Primary | ICD-10-CM

## 2021-09-30 PROCEDURE — 99024 POSTOP FOLLOW-UP VISIT: CPT | Mod: 95 | Performed by: SURGERY

## 2021-09-30 RX ORDER — URSODIOL 300 MG/1
300 CAPSULE ORAL 2 TIMES DAILY
Qty: 60 CAPSULE | Refills: 5 | Status: SHIPPED | OUTPATIENT
Start: 2021-09-30 | End: 2022-02-03

## 2021-09-30 ASSESSMENT — PAIN SCALES - GENERAL: PAINLEVEL: NO PAIN (0)

## 2021-09-30 ASSESSMENT — MIFFLIN-ST. JEOR: SCORE: 1686.68

## 2021-09-30 NOTE — NURSING NOTE
"Chief Complaint   Patient presents with     Follow Up     1 Week Post-Op Laparscopic RNY Surgery       Vitals:    09/30/21 0943   Weight: 95.3 kg (210 lb)   Height: 1.6 m (5' 3\")       Body mass index is 37.2 kg/m .                          "

## 2021-09-30 NOTE — PROGRESS NOTES
"Minerva is a 21 year old who is being evaluated via a billable video visit.      How would you like to obtain your AVS? MyChart  If the video visit is dropped, the invitation should be resent by: Text to cell phone: [phone]  Will anyone else be joining your video visit? No      Video Start Time: 10:08 AM  Video-Visit Details    Type of service:  Video Visit    Video End Time:10:08 AM    Originating Location (pt. Location): Home    Distant Location (provider location):  [facility] [place]     Platform used for Video Visit: [facility]       Postoperative bariatric surgery visit.    Patient underwent gastric bypass 1 weeks ago.      Tolerating liquids: yes  Lightheadedness: none  Abdominal pain: none  Bowel movements: none  Fevers/shakes/chills: none    Heartburn and reflux gone; hasn't had problems for the first time that she can remember in her 21 yrs.    Ht 1.6 m (5' 3\")   Wt 95.3 kg (210 lb)   BMI 37.20 kg/m    NAD  Overall looks great  Incisions c/d/i    Plan:  1. RD visit today.  2. Start vitamin supplements per RD directions.  3. Advance diet per RD directions.  4. Follow-up: 4 weeks.    Sergo Gallegos MD  Surgery  [phone] (hospital )  [phone] (clinic nurses)                "

## 2021-09-30 NOTE — LETTER
"9/30/2021       RE: Minerva Finch  [address] [facility] [address]     Dear Colleague,    Thank you for referring your patient, Minerva Finch, to the [facility] [place] at [facility]. Please see a copy of my visit note below.    Minerva is a 21 year old who is being evaluated via a billable video visit.      How would you like to obtain your AVS? MyChart  If the video visit is dropped, the invitation should be resent by: Text to cell phone: [phone]  Will anyone else be joining your video visit? No      Video Start Time: 10:08 AM  Video-Visit Details    Type of service:  Video Visit    Video End Time:10:08 AM    Originating Location (pt. Location): Home    Distant Location (provider location):  [facility] [place]     [facility]       Postoperative bariatric surgery visit.    Patient underwent gastric bypass 1 weeks ago.      Tolerating liquids: yes  Lightheadedness: none  Abdominal pain: none  Bowel movements: none  Fevers/shakes/chills: none    Heartburn and reflux gone; hasn't had problems for the first time that she can remember in her 21 yrs.    Ht 1.6 m (5' 3\")   Wt 95.3 kg (210 lb)   BMI 37.20 kg/m    NAD  Overall looks great  Incisions c/d/i    Plan:  1. RD visit today.  2. Start vitamin supplements per RD directions.  3. Advance diet per RD directions.  4. Follow-up: 4 weeks.    Sergo Gallegos MD  Surgery  [phone] (hospital )  [phone] (clinic nurses)      "

## 2021-10-01 ENCOUNTER — MYC MEDICAL ADVICE (OUTPATIENT)
Dept: ENDOCRINOLOGY | Facility: CLINIC | Age: 21
End: 2021-10-01

## 2021-10-01 NOTE — LETTER
October 4, 2021      Minerva Finch  [address] [facility] [address]          To Whom It May Concern,      Minerva Finch may return to work October 6, 2021 with the following restrictions:    No lifting greater than 15 pounds until October 25th     At that point she may work without restrictions.  Please contact our office with any questions regarding these restrictions.        Sincerely,      Sergo Gallegos MD  as

## 2021-10-25 ENCOUNTER — VIRTUAL VISIT (OUTPATIENT)
Dept: ENDOCRINOLOGY | Facility: CLINIC | Age: 21
End: 2021-10-25
Payer: COMMERCIAL

## 2021-10-25 VITALS — WEIGHT: 197 LBS | HEIGHT: 63 IN | BODY MASS INDEX: 34.91 KG/M2

## 2021-10-25 DIAGNOSIS — Z71.3 NUTRITIONAL COUNSELING: Primary | ICD-10-CM

## 2021-10-25 DIAGNOSIS — Z98.84 S/P GASTRIC BYPASS: Primary | ICD-10-CM

## 2021-10-25 DIAGNOSIS — Z98.84 S/P GASTRIC BYPASS: ICD-10-CM

## 2021-10-25 DIAGNOSIS — E66.9 OBESITY (BMI 30-39.9): ICD-10-CM

## 2021-10-25 DIAGNOSIS — Z86.2 HX OF IRON DEFICIENCY ANEMIA: ICD-10-CM

## 2021-10-25 PROCEDURE — 97803 MED NUTRITION INDIV SUBSEQ: CPT | Mod: 95 | Performed by: DIETITIAN, REGISTERED

## 2021-10-25 PROCEDURE — 99024 POSTOP FOLLOW-UP VISIT: CPT | Performed by: PHYSICIAN ASSISTANT

## 2021-10-25 RX ORDER — ONDANSETRON 4 MG/1
4 TABLET, ORALLY DISINTEGRATING ORAL EVERY 8 HOURS PRN
Qty: 30 TABLET | Refills: 0 | Status: SHIPPED | OUTPATIENT
Start: 2021-10-25

## 2021-10-25 RX ORDER — CYANOCOBALAMIN 1000 UG/ML
1 INJECTION, SOLUTION INTRAMUSCULAR; SUBCUTANEOUS
Qty: 1 ML | Refills: 11 | Status: SHIPPED | OUTPATIENT
Start: 2021-10-25

## 2021-10-25 ASSESSMENT — MIFFLIN-ST. JEOR: SCORE: 1627.72

## 2021-10-25 ASSESSMENT — PAIN SCALES - GENERAL: PAINLEVEL: NO PAIN (0)

## 2021-10-25 NOTE — PATIENT INSTRUCTIONS
Plan:  1. RD visit today.  2. Start vitamin supplements per RD directions.  3. Advance diet per RD directions.  4. Follow-up: 2 months with Aida and RD and labs.   5. Actigall prescription Ok to open and put in food.   6. B12 SL or injection:  B12 injection prescribed today.

## 2021-10-25 NOTE — PATIENT INSTRUCTIONS
"Goals:  1) Follow soft diet for 4 weeks, then to progress to bariatric regular diet  2) Consume 60 grams of protein/day.  3) Sip on 48-64 oz of fluids/day- between meals only  *Okay to drink fluids before meal time. If causing issues, go back to none before.   4) Eat slowly (>20 min/meal), chewing foods well (to applesauce-like consistency).  5) Limit portions to ~1/2 cup/meal until 3 months post op  6) -Take the following after a Rashida-en-y Gastric Bypass:    Multivitamin/minerals: adult dose 2 times daily    Iron: 45-60 mg elemental daily  - may partly or fully be covered in multivitamin     Calcium Citrate containing vitamin D: 500 mg 3 times daily or 600 mg 2 times daily    Vitamin B12: sublingual form of at least 500 mcg daily or injection of 1000 mcg monthly   Obtain additional iron to meet 60 mg/day.   Obtain ca citrate chewable - look online   B12 injection once at pharmacy     Post-op Diet Advancement Schedule:  Soft Diet (stage 4): 10/21 - 11/17  Regular Diet (stage 5): 11/18    Post-op Diet Handouts:  Diet Guidelines after Weight-loss Surgery  [email or web address]     Your Stage 4 Diet: Soft Foods  [email or web address]    Your Stage 5 Diet: Regular Foods  [email or web address]    Keeping Track of Fluids  [email or web address]    Exercises after Weight Loss Surgery (strengthening, when no weight lifting restrictions)  [email or web address]    Preventing Low Blood Sugar after Weight Loss Surgery  [email or web address]://[email or web address]    Preventing Dumping Syndrome after Weight Loss Surgery  [email or web address]://[email or web address] [email or web address]       Additional resources:     Dish Sets: bariatric meal size bowls and plates with built in measurement designs on the dishes  https://store.Ium.[employer]/collections/bariatric-dinnerware    Books:  \"Fresh Start Bariatric Cookbook\" by Lissette aMya, MS, RDN, CD - $13 - Excellent cookbook with post-op recipes and many other resources to " "check out for ongoing support after surgery    \"Eating Well After Weight Loss Surgery\" by Daphnie Patel and Karri Guthrie - $10 - Great cookbook with recipes for each diet stage after surgery and recommended portion sizes. Slightly more intensive cooking recipes.    \"Bariatric Mindset Success\" by Anastasia Milligan - $14.24 on Amazon - book that helps with prevention of weight regain after surgery. Helps train your brain for long term success with weight loss after surgery.    Post-Bariatric Surgery Online Recipe Resources:  Online:  Simpler Recipes: https://www.[employer]ated [employer] Inc./bariatric-surgery/recipes    Wonton cups and muffin tin portion sized recipes: https://www.Virtualmin/blog/10-single-serving-meals-you-need-in-your-bariatric-life/    Some recipes on this site have larger than 1 cup portion size pictures: http://www.[facility].org/weight-loss-surgery/nutrition/recipe-corner.html    https://www.bizHive.me/25-bariatric-friendly-weeknight-meals/    Crockpot recipes: https://www.bizHive.me/25-bariatric-friendly-crockpot-recipes/    https://ImageProtect/recipes/     https://www.DesignGooroo.Creative Market/mbd-recipes         Follow-Up: 2 months (3 months post op)    "

## 2021-10-25 NOTE — NURSING NOTE
"(   Chief Complaint   Patient presents with     RECHECK     S/P LRNY 9/24/21 - 1 month +    )    ( Weight: 89.4 kg (197 lb) (Patient reported) )  ( Height: 160 cm (5' 3\") )  ( BMI (Calculated): 34.9 )  (   )  (   )  (   )  (   )  (   )  (   )    (   )  (   )  (   )  (   )  (   )  (   )  (   )    (   Patient Active Problem List   Diagnosis     Erosive esophagitis     Gastroesophageal reflux disease with esophagitis     Obesity (BMI 30-39.9)     Elevated transaminase level     Fatty liver     H/O: suicide attempt     Hiatal hernia     Postprandial vomiting     Regurgitation of food     Heartburn     Morbid obesity (H)     Gastric reflux syndrome     Iron deficiency anemia due to chronic blood loss    )  (   Current Outpatient Medications   Medication Sig Dispense Refill     FLUoxetine (PROZAC) 40 MG capsule Take 40 mg by mouth every morning        hydrOXYzine (ATARAX) 25 MG tablet Take 25 mg by mouth daily as needed        medroxyPROGESTERone (DEPO-PROVERA) 150 MG/ML IM injection Inject 150 mg into the muscle every 3 months        methocarbamol (ROBAXIN) 500 MG tablet Take 1 tablet (500 mg) by mouth 4 times daily as needed for muscle spasms 15 tablet 0     ondansetron (ZOFRAN-ODT) 4 MG ODT tab Take 1 tablet (4 mg) by mouth every 8 hours as needed for nausea 15 tablet 0     oxyCODONE (ROXICODONE) 5 MG tablet Take 1 tablet (5 mg) by mouth every 6 hours as needed for moderate to severe pain 12 tablet 0     RABEprazole (ACIPHEX) 20 MG EC tablet Take 20 mg twice daily, taken 30-60 minutes prior to meals (Patient taking differently: Take 20 mg by mouth 2 times daily Take 20 mg twice daily, taken 30-60 minutes prior to meals) 60 tablet 3     senna-docusate (SENOKOT-S/PERICOLACE) 8.6-50 MG tablet Take 2 tablets by mouth daily as needed for constipation (While taking narcotic pain medications.  Stop taking if having loose stools.) 30 tablet 1     ursodiol (ACTIGALL) 300 MG capsule Take 1 capsule (300 mg) by mouth 2 times daily " 60 capsule 5    )  ( Diabetes Eval:    )    ( Pain Eval:  No Pain (0) )    ( Wound Eval:       )    (   History   Smoking Status     Never Smoker   Smokeless Tobacco     Never Used     Comment: mom smokes outside    )    ( Signed By:  Mery Kirkpatrick, EMT; October 25, 2021; 8:41 AM )

## 2021-10-25 NOTE — PROGRESS NOTES
"Minerva Finch is a 21 year old female who is being evaluated via a billable video visit.      The patient has been notified of following:     \"This video visit will be conducted via a call between you and your physician/provider. We have found that certain health care needs can be provided without the need for an in-person physical exam.  This service lets us provide the care you need with a video conversation.  If a prescription is necessary we can send it directly to your pharmacy.  If lab work is needed we can place an order for that and you can then stop by our lab to have the test done at a later time.    Video visits are billed at different rates depending on your insurance coverage.  Please reach out to your insurance provider with any questions.    If during the course of the call the physician/provider feels a video visit is not appropriate, you will not be charged for this service.\"       Patient has given verbal consent for Video visit? yes  How would you like to obtain your AVS? MyChart  If you are dropped from the video visit, the video invite should be resent to: [phone]  Will anyone else be joining your video visit? No  {If patient encounters technical issues they should call [phone]       Video-Visit Details    Type of service:  Video Visit    Video Start Time: 9:43 AM   Video End Time: 9:56 AM     Originating Location (pt. Location): Home     Distant Location (provider location):  [facility] WEIGHT MANAGEMENT [facility]      Platform used for Video Visit: Three Rings     During this virtual visit the patient is located in [place], patient verifies this as the location during the entirety of this visit.       Nutrition Reassessment  Reason For Visit:  Minerva Finch is a 21 year old female presenting today for nutrition follow-up, 1 month s/p RNYGB with Dr Gallegos on 9/24/21.    Patient referred by Dr. Gallegos on 9/24/21.     Anthropometrics:  Initial Consult Weight 8/6/2020: 215 " "lbs    *Pt gained weight prior to surgery with a weight of 229 lbs reported on 5/27/21 (BMI 40.57)   Pt seen on site by GAVIN Baron on 9/10/20 with weight of 227, reported weight of 215 on her home scale 9/7/21.      Day of Surgery Weight: 218 lbs with BMI 38.74     Estimated body mass index is 34.9 kg/m  as calculated from the following:    Height as of an earlier encounter on 10/25/21: 1.6 m (5' 3\").    Weight as of an earlier encounter on 10/25/21: 89.4 kg (197 lb).     Current weight: 197 lbs, pt reported     Weight loss: -32 lbs from 229 lbst; -21 lbs from day of surgery    Current Vitamins/Minerals  [place] chewable MVI with iron 10 mg per pill per pt, has been taking 2x/day  - Vit A 400 mcg, pill   - Copper .44 mg per pill    *Aware of need to separate Ca and iron by 2 hrs     Hx of anemia - has had blood transfusions. Will plan to increase to 60 mg iron/day with additional iron supplementation. Platelet count in hospital day after surgery was normal    Oct 25th - B12 injection prescribed today, pt still needs to get additional iron and a ca citrate. Was unable to find a chewable ca in stores - recommend she look online.   Lab orders placed today by Aida.     Nutrition History:    Plans to start soft diet today.     Doing well overall. No N/V. Some phlegm - discussed with Aida.     Recent recall  Breakfast: 1 scrambled or boiled egg  Snack: Protein shake/water   Lunch: blended broccoli/cheese  Snack: protein shake  Supper: Varies - always protein or will add protein powder   *Having 1.5 shakes/day on average (~45 grams/protein)     Progress with Previous Goals:  1) Follow bariatric diet progression below - Pt has not started soft diet yet. Plans to start today.  2) Work towards 60+ gm protein/day. - Getting just under 60 grams per day on average per pt. Discussed goal of 60 grams. Pt going shopping today for items on soft diet.   3) Consume 48-64 oz fluids daily- between meals only once on puree diet - " Met,  30 min minimum. Feeling urges to chug water, discussed. Recommend pt try to get more fluids in (no signs of dehydrate noted). Okay to drink before meal time to help with this.   4) Eat slowly (>20 min/meal), chewing well to smooth consistency once on the bariatric soft diet.  5) Limit portions to 1/4 to 1/2 cup/meal.- Met  6) Start chewable/liquid multivitamin/minerals twice daily. - Met    Nutrition Prescription:  Grams Protein: 60 (minimum)   Amount of Fluid: 48-64 oz    Nutrition Diagnosis  Previous: Food and nutrition-related knowledge deficit r/t lack of prior exposure to diet advancements beyond bariatric low-fat full liquid diet aeb pt unable to verbalize understanding of bariatric pureed and soft diets.    Current: Food and nutrition-related knowledge deficit r/t lack of prior exposure to diet advancements beyond bariatric pureed diet aeb pt unable to verbalize full understanding of bariatric soft and regular consistency diets.    Intervention  Materials/Education provided on bariatric soft and regular consistency diets, protein intake, fluid intake, eating pace, chewing foods well, portion control, sugar/fat intake, recommended vitamin/mineral supplements. Patient demonstrates understanding.       Expected Engagement: good    Goals:  1) Follow soft diet for 4 weeks, then to progress to bariatric regular diet  2) Consume 60 grams of protein/day.  3) Sip on 48-64 oz of fluids/day- between meals only  *Okay to drink fluids before meal time. If causing issues, go back to none before.   4) Eat slowly (>20 min/meal), chewing foods well (to applesauce-like consistency).  5) Limit portions to ~1/2 cup/meal until 3 months post op  6) -Take the following after a Rashida-en-y Gastric Bypass:    Multivitamin/minerals: adult dose 2 times daily    Iron: 45-60 mg elemental daily  - may partly or fully be covered in multivitamin     Calcium Citrate containing vitamin D: 500 mg 3 times daily or 600 mg 2 times  "daily    Vitamin B12: sublingual form of at least 500 mcg daily or injection of 1000 mcg monthly   Obtain additional iron to meet 60 mg/day.   Obtain ca citrate chewable - look online   B12 injection once at pharmacy     Post-op Diet Advancement Schedule:  Soft Diet (stage 4): 10/21 - 11/17  Regular Diet (stage 5): 11/18    Post-op Diet Handouts:  Diet Guidelines after Weight-loss Surgery  [email or web address]     Your Stage 4 Diet: Soft Foods  [email or web address]    Your Stage 5 Diet: Regular Foods  [email or web address]    Keeping Track of Fluids  [email or web address]    Exercises after Weight Loss Surgery (strengthening, when no weight lifting restrictions)  [email or web address]    Preventing Low Blood Sugar after Weight Loss Surgery  [email or web address]    Preventing Dumping Syndrome after Weight Loss Surgery  [email or web address]       Additional resources:     Dish Sets: bariatric meal size bowls and plates with built in measurement designs on the dishes  https://store.[employer].Bettyvision/collections/bariatric-dinnerware    Books:  \"Fresh Start Bariatric Cookbook\" by Lissette Maya, MS, RDN, CD - $13 - Excellent cookbook with post-op recipes and many other resources to check out for ongoing support after surgery    \"Eating Well After Weight Loss Surgery\" by Daphnie Patel and Karri Guthrie - $10 - Great cookbook with recipes for each diet stage after surgery and recommended portion sizes. Slightly more intensive cooking recipes.    \"Bariatric Mindset Success\" by Anastasia Milligan - $14.24 on Amazon - book that helps with prevention of weight regain after surgery. Helps train your brain for long term success with weight loss after surgery.    Post-Bariatric Surgery Online Recipe Resources:  Online:  Simpler Recipes: https://www.Waddle/bariatric-surgery/recipes    Wonton cups and muffin tin portion sized recipes: " https://www.Friends Around/blog/10-single-serving-meals-you-need-in-your-bariatric-life/    Some recipes on this site have larger than 1 cup portion size pictures: http://www.[facility].org/weight-loss-surgery/nutrition/recipe-corner.html    https://www.BitWave.me/25-bariatric-friendly-weeknight-meals/    Crockpot recipes: https://www.BitWave.me/25-bariatric-friendly-crockpot-recipes/    https://Bricsnet.Dasient/recipes/     https://www.Rebel Monkey.Dasient/mbd-recipes         Follow-Up: 2 months (3 months post op)    Time spent with patient: 13 minutes.  GLYNN Christensen, RD LD

## 2021-10-25 NOTE — LETTER
"10/25/2021       RE: Minerva Finch  [address] [place] [facility] [place] [address]     Dear Colleague,    Thank you for referring your patient, Minerva Finch, to the [facility] WEIGHT MANAGEMENT CLINIC [place] at [facility]. Please see a copy of my visit note below.    Minerva Finch is a 21 year old female who is being evaluated via a billable video visit.      The patient has been notified of following:     \"This video visit will be conducted via a call between you and your physician/provider. We have found that certain health care needs can be provided without the need for an in-person physical exam.  This service lets us provide the care you need with a video conversation.  If a prescription is necessary we can send it directly to your pharmacy.  If lab work is needed we can place an order for that and you can then stop by our lab to have the test done at a later time.    Video visits are billed at different rates depending on your insurance coverage.  Please reach out to your insurance provider with any questions.    If during the course of the call the physician/provider feels a video visit is not appropriate, you will not be charged for this service.\"       Patient has given verbal consent for Video visit? yes  How would you like to obtain your AVS? MyChart  If you are dropped from the video visit, the video invite should be resent to: [phone]  Will anyone else be joining your video visit? No  {If patient encounters technical issues they should call [phone]       Video-Visit Details    Type of service:  Video Visit    Video Start Time: 9:43 AM   Video End Time: 9:56 AM     Originating Location (pt. Location): Home     Distant Location (provider location):  [facility] WEIGHT MANAGEMENT CLINIC [place]      Platform used for Video Visit: Flock     During this virtual visit the patient is located in [place], patient verifies this as " "the location during the entirety of this visit.       Nutrition Reassessment  Reason For Visit:  Minerva Finch is a 21 year old female presenting today for nutrition follow-up, 1 month s/p RNYGB with Dr Gallegos on 9/24/21.    Patient referred by Dr. Gallegos on 9/24/21.     Anthropometrics:  Initial Consult Weight 8/6/2020: 215 lbs    *Pt gained weight prior to surgery with a weight of 229 lbs reported on 5/27/21 (BMI 40.57)   Pt seen on site by GAVIN Baron on 9/10/20 with weight of 227, reported weight of 215 on her home scale 9/7/21.      Day of Surgery Weight: 218 lbs with BMI 38.74     Estimated body mass index is 34.9 kg/m  as calculated from the following:    Height as of an earlier encounter on 10/25/21: 1.6 m (5' 3\").    Weight as of an earlier encounter on 10/25/21: 89.4 kg (197 lb).     Current weight: 197 lbs, pt reported     Weight loss: -32 lbs from 229 lbst; -21 lbs from day of surgery    Current Vitamins/Minerals  [place] chewable MVI with iron 10 mg per pill per pt, has been taking 2x/day  - Vit A 400 mcg, pill   - Copper .44 mg per pill    *Aware of need to separate Ca and iron by 2 hrs     Hx of anemia - has had blood transfusions. Will plan to increase to 60 mg iron/day with additional iron supplementation. Platelet count in hospital day after surgery was normal    Oct 25th - B12 injection prescribed today, pt still needs to get additional iron and a ca citrate. Was unable to find a chewable ca in stores - recommend she look online.   Lab orders placed today by Aida.     Nutrition History:    Plans to start soft diet today.     Doing well overall. No N/V. Some phlegm - discussed with Aida.     Recent recall  Breakfast: 1 scrambled or boiled egg  Snack: Protein shake/water   Lunch: blended broccoli/cheese  Snack: protein shake  Supper: Varies - always protein or will add protein powder   *Having 1.5 shakes/day on average (~45 grams/protein)     Progress with Previous Goals:  1) Follow bariatric " diet progression below - Pt has not started soft diet yet. Plans to start today.  2) Work towards 60+ gm protein/day. - Getting just under 60 grams per day on average per pt. Discussed goal of 60 grams. Pt going shopping today for items on soft diet.   3) Consume 48-64 oz fluids daily- between meals only once on puree diet - Met,  30 min minimum. Feeling urges to chug water, discussed. Recommend pt try to get more fluids in (no signs of dehydrate noted). Okay to drink before meal time to help with this.   4) Eat slowly (>20 min/meal), chewing well to smooth consistency once on the bariatric soft diet.  5) Limit portions to 1/4 to 1/2 cup/meal.- Met  6) Start chewable/liquid multivitamin/minerals twice daily. - Met    Nutrition Prescription:  Grams Protein: 60 (minimum)   Amount of Fluid: 48-64 oz    Nutrition Diagnosis  Previous: Food and nutrition-related knowledge deficit r/t lack of prior exposure to diet advancements beyond bariatric low-fat full liquid diet aeb pt unable to verbalize understanding of bariatric pureed and soft diets.    Current: Food and nutrition-related knowledge deficit r/t lack of prior exposure to diet advancements beyond bariatric pureed diet aeb pt unable to verbalize full understanding of bariatric soft and regular consistency diets.    Intervention  Materials/Education provided on bariatric soft and regular consistency diets, protein intake, fluid intake, eating pace, chewing foods well, portion control, sugar/fat intake, recommended vitamin/mineral supplements. Patient demonstrates understanding.       Expected Engagement: good    Goals:  1) Follow soft diet for 4 weeks, then to progress to bariatric regular diet  2) Consume 60 grams of protein/day.  3) Sip on 48-64 oz of fluids/day- between meals only  *Okay to drink fluids before meal time. If causing issues, go back to none before.   4) Eat slowly (>20 min/meal), chewing foods well (to applesauce-like consistency).  5)  "Limit portions to ~1/2 cup/meal until 3 months post op  6) -Take the following after a Rashida-en-y Gastric Bypass:    Multivitamin/minerals: adult dose 2 times daily    Iron: 45-60 mg elemental daily  - may partly or fully be covered in multivitamin     Calcium Citrate containing vitamin D: 500 mg 3 times daily or 600 mg 2 times daily    Vitamin B12: sublingual form of at least 500 mcg daily or injection of 1000 mcg monthly   Obtain additional iron to meet 60 mg/day.   Obtain ca citrate chewable - look online   B12 injection once at pharmacy     Post-op Diet Advancement Schedule:  Soft Diet (stage 4): 10/21 - 11/17  Regular Diet (stage 5): 11/18    Post-op Diet Handouts:  Diet Guidelines after Weight-loss Surgery  [email or web address]     Your Stage 4 Diet: Soft Foods  [email or web address]    Your Stage 5 Diet: Regular Foods  [email or web address]    Keeping Track of Fluids  [email or web address]    Exercises after Weight Loss Surgery (strengthening, when no weight lifting restrictions)  [email or web address]    Preventing Low Blood Sugar after Weight Loss Surgery  [email or web address]    Preventing Dumping Syndrome after Weight Loss Surgery  [email or web address]       Additional resources:     Dish Sets: bariatric meal size bowls and plates with built in measurement designs on the dishes  https://store.Splashup/collections/bariatric-dinnerware    Books:  \"Fresh Start Bariatric Cookbook\" by Lissette Maya, MS, RDN, CD - $13 - Excellent cookbook with post-op recipes and many other resources to check out for ongoing support after surgery    \"Eating Well After Weight Loss Surgery\" by Daphnie Patel and Karri Guthrie - $10 - Great cookbook with recipes for each diet stage after surgery and recommended portion sizes. Slightly more intensive cooking recipes.    \"Bariatric Mindset Success\" by Anastasia Milligan - $14.24 on Amazon - book that " helps with prevention of weight regain after surgery. Helps train your brain for long term success with weight loss after surgery.    Post-Bariatric Surgery Online Recipe Resources:  Online:  Simpler Recipes: https://www.intelloCut/bariatric-surgery/recipes    Wonton cups and muffin tin portion sized recipes: https://www.RecoVend/blog/10-single-serving-meals-you-need-in-your-bariatric-life/    Some recipes on this site have larger than 1 cup portion size pictures: http://www.[facility].org/weight-loss-surgery/nutrition/recipe-corner.html    https://www.Artspace.me/25-bariatric-friendly-weeknight-meals/    Crockpot recipes: https://www.Artspace.me/25-bariatric-friendly-crockpot-recipes/    https://Qwiki.Edustation.me/recipes/     https://www.proVITAL.Edustation.me/mbd-recipes         Follow-Up: 2 months (3 months post op)    Time spent with patient: 13 minutes.  GLYNN Christensen, RD LD

## 2021-10-25 NOTE — LETTER
"10/25/2021       RE: Minerva Finch  [address] [facility] [facility] [address]     Dear Colleague,    Thank you for referring your patient, Minerva Finch, to the [facility] WEIGHT MANAGEMENT CLINIC [place] at [facility]. Please see a copy of my visit note below.    Minerva is a 21 year old who is being evaluated via a billable video visit.      How would you like to obtain your AVS? MyChart  If the video visit is dropped, the invitation should be resent by: [phone]    Will anyone else be joining your video visit? No    During this virtual visit the patient is located in [place], patient verifies this as the location during the entirety of this visit.     Video Start Time: 9:09 AM    Video-Visit Details    Type of service:  Video Visit    Video End Time:920    Originating Location (pt. Location): Home    Distant Location (provider location):  [facility] WEIGHT MANAGEMENT CLINIC [place]     Platform used for Video Visit: [facility] [place] [facility]     Postoperative bariatric surgery visit.    Patient underwent LRYGB for severe reflux 9/24/21 with Dr Gallegos. 1 mo postop. Reports doing well. No acid reflux.    Tolerating liquids: 48-64 oz daily. Just started soft foods and going ok. Still taking occasional zofran  Lightheadedness: no  Abdominal pain: no  Bowel movements: no diarrhea/constipation  Fevers/shakes/chills: no  GERD: no Taking aciphex daily  Leg/calf pain: no    How many opioid pain medications used after surgery? 4 What did you do with extra pills? At home  Were any opioid pain medication refills provided after surgery? no  Were any opioid pain medications needed after 30 days postop? no    Ht 1.6 m (5' 3\")   Wt 89.4 kg (197 lb)   BMI 34.90 kg/m    NAD  Overall looks good  Incisions c/d/i per patient.    Plan:  1. RD visit today.  2. Start vitamin supplements per RD directions.  3. Advance diet per RD directions.  4. Follow-up: 2 months with Aida" and RD and labs.   5. Actigall prescription? Prescribed but concerned about size.  Ok to open and put in food.   6. B12 SL or injection:  B12 injection prescribed today.  7. Pathology reviewed N/A  8. Weight loss medications: none      Aida Zacarias PA-C MPAS  Lead PA Surgical and Medical Weight Management     [phone]  [email or web address]

## 2021-10-25 NOTE — PROGRESS NOTES
"Minerva is a 21 year old who is being evaluated via a billable video visit.      How would you like to obtain your AVS? MyChart  If the video visit is dropped, the invitation should be resent by: [phone]    Will anyone else be joining your video visit? No    During this virtual visit the patient is located in [place], patient verifies this as the location during the entirety of this visit.     Video Start Time: 9:09 AM    Video-Visit Details    Type of service:  Video Visit    Video End Time:920    Originating Location (pt. Location): Home    Distant Location (provider location):  [facility] WEIGHT MANAGEMENT CLINIC [place]     Platform used for Video Visit: [facility]     Postoperative bariatric surgery visit.    Patient underwent LRYGB for severe reflux 9/24/21 with Dr Gallegos. 1 mo postop. Reports doing well. No acid reflux.    Tolerating liquids: 48-64 oz daily. Just started soft foods and going ok. Still taking occasional zofran  Lightheadedness: no  Abdominal pain: no  Bowel movements: no diarrhea/constipation  Fevers/shakes/chills: no  GERD: no Taking aciphex daily  Leg/calf pain: no    How many opioid pain medications used after surgery? 4 What did you do with extra pills? At home  Were any opioid pain medication refills provided after surgery? no  Were any opioid pain medications needed after 30 days postop? no    Ht 1.6 m (5' 3\")   Wt 89.4 kg (197 lb)   BMI 34.90 kg/m    NAD  Overall looks good  Incisions c/d/i per patient.    Plan:  1. RD visit today.  2. Start vitamin supplements per RD directions.  3. Advance diet per RD directions.  4. Follow-up: 2 months with Aiad and ROSALIE and labs.   5. Actigall prescription? Prescribed but concerned about size.  Ok to open and put in food.   6. B12 SL or injection:  B12 injection prescribed today.  7. Pathology reviewed N/A  8. Weight loss medications: none      Aida Zacarias PA-C MPAS  Lead PA Surgical and Medical Weight Management "     [phone]  [email or web address]

## 2021-11-16 ENCOUNTER — PATIENT OUTREACH (OUTPATIENT)
Dept: ENDOCRINOLOGY | Facility: CLINIC | Age: 21
End: 2021-11-16
Payer: COMMERCIAL

## 2021-11-16 NOTE — PROGRESS NOTES
Patient left message stating she his having a hard time keeping food down, including fluids.  Attempted to call patient back but voicemail is not setup.  [employer] message sent.

## 2021-11-29 ENCOUNTER — PATIENT OUTREACH (OUTPATIENT)
Dept: ENDOCRINOLOGY | Facility: CLINIC | Age: 21
End: 2021-11-29
Payer: COMMERCIAL

## 2021-11-29 NOTE — PROGRESS NOTES
Left message for patient to return call to office.  Patient had left VM 11/23/21 stating she had an update about her health.  Will await patient's return call.

## 2021-11-29 NOTE — PROGRESS NOTES
Patient went into an ED in [place] because she couldn't keep anything down.  She ended up having her gallbladder out.  She was still having issues with keeping food down.  She was in [place] at the time.  She ended up being admitted in [place].  She had an EGD.  She states there was significant scarring.  She was dilated to 12 mm.  She is supposed to go back in 2 weeks to have it redilated.  Films are being pushed to [employer].

## 2021-12-23 NOTE — PROGRESS NOTES
"Minerva Finch is a 21 year old female who is being evaluated via a billable video visit.      The patient has been notified of following:     \"This video visit will be conducted via a call between you and your physician/provider. We have found that certain health care needs can be provided without the need for an in-person physical exam.  This service lets us provide the care you need with a video conversation.  If a prescription is necessary we can send it directly to your pharmacy.  If lab work is needed we can place an order for that and you can then stop by our lab to have the test done at a later time.    Video visits are billed at different rates depending on your insurance coverage.  Please reach out to your insurance provider with any questions.    If during the course of the call the physician/provider feels a video visit is not appropriate, you will not be charged for this service.\"    Patient has given verbal consent for Video visit? yes  How would you like to obtain your AVS? MyChart  If you are dropped from the video visit, the video invite should be resent to: [phone]  Will anyone else be joining your video visit? No  {If patient encounters technical issues they should call [phone]      Video-Visit Details    Type of service:  Video Visit    Video Start Time:  8:40 am  Video End Time: 8:53 am     Originating Location (pt. Location): Home     Distant Location (provider location):  [facility] [place]      Platform used for Video Visit: Sportsvite D/B/[employer] LeagueApps     During this virtual visit the patient is located in [place], patient verifies this as the location during the entirety of this visit.       Nutrition Reassessment  Reason For Visit:  Minerva Finch is a 21 year old female presenting today for nutrition follow-up, 3 month s/p RNYGB with Dr Gallegos on 9/24/21.     Patient referred by Dr. Gallegos on 9/24/21.        Anthropometrics:  Initial Consult Weight 8/6/2020: 215 " "lbs    *Pt gained weight prior to surgery with a weight of 229 lbs reported on 5/27/21 (BMI 40.57)   Pt seen on site by GAVIN Baron on 9/10/21 with weight of 227, reported weight of 215 on her home scale 9/7/21.     Day of Surgery Weight: 218 lbs with BMI 38.74     Estimated body mass index is 34.9 kg/m  as calculated from the following:    Height as of 10/25/21: 1.6 m (5' 3\").    Weight as of 10/25/21: 89.4 kg (197 lb).    Current weight: 164 lbs, pt reported    Weight loss: -51 lbs (23.72%) from initial consult; -54 lbs from day of surgery    Current Vitamins/Minerals  [place] chewable MVI with iron 10 mg per pill per pt, has been taking 2x/day  - Vit A 400 mcg, pill   - Copper .44 mg per pill  B12 injection   Additional iron - not sure dose off hand.     *Aware of need to separate Ca and iron by 2 hrs     Hx of anemia - has had blood transfusions. Will plan to increase to 60 mg iron/day with additional iron supplementation. Platelet count in hospital day after surgery was normal     Due for labs. Still needs to get calcium citrate - was unable to find chewable form.    Nutrition History:  Things going well today per pt. No dietary concerns or questions.     Pt did have 2 Recent ED visits - first one resulted in pt having her gallbladder removed on Nov 17th. Pt was still struggling to keep food down following this visit and returned to ED. Had EGD done - please see ED notes for more detail.  Now has a stent per pt.  Feeling much better right now. On soft/puree.   Planning on stent out Feb 7th.    Typical day:  2 protein shakes/day  1 egg with string cheese  Turkey or ham roll up  Dinner varies     Avoids greasy foods     Progress with Previous Goals:  1) Follow soft diet for 4 weeks, then to progress to bariatric regular diet  - On puree/soft right now due to stent.  2) Consume 60 grams of protein/day. - Met, getting 60 grams minimum. Doing 2 protein shakes/day and meat protein.  3) Sip on 48-64 oz of fluids/day- " between meals only  *Okay to drink fluids before meal time. If causing issues, go back to none before.  - Met getting 60-70 oz/day. Having little sips before food and not during or after.  4) Eat slowly (>20 min/meal), chewing foods well (to applesauce-like consistency). - Met  5) Limit portions to ~1/2 cup/meal until 3 months post op  6) -Take the following after a Rashida-en-y Gastric Bypass:    Multivitamin/minerals: adult dose 2 times daily    Iron: 45-60 mg elemental daily  - may partly or fully be covered in multivitamin     Calcium Citrate containing vitamin D: 500 mg 3 times daily or 600 mg 2 times daily    Vitamin B12: sublingual form of at least 500 mcg daily or injection of 1000 mcg monthly   Obtain additional iron to meet 60 mg/day. - Met  Obtain ca citrate chewable - look online - Not yet   B12 injection once at pharmacy - Met    Nutrition Prescription:  Grams Protein: 60 (minimum)   Amount of Fluid: 48-64 oz      Nutrition Diagnosis  Previous: Food and nutrition-related knowledge deficit r/t lack of prior exposure to diet instruction beyond 3 months s/p SG as evidenced by Pt seeking further guidance from RD on diet instruction beyond 3 months s/p SG.     Current: Food and nutrition-related knowledge deficit r/t lack of prior exposure to diet instruction beyond 6 months s/p SG as evidenced by Pt seeking further guidance from RD on diet instruction beyond 6 months s/p SG.     Intervention  Materials/Education provided, reviewed bariatric regular consistency diet, protein intake, fluid intake, eating pace, chewing foods well, portion control, sugar/fat intake, recommended vitamin/mineral supplements. Patient demonstrates understanding.     Expected Engagement: good    Goals:  1) Follow bariatric regular diet.   2) Consume 60+ grams of protein/day.  3) Sip on 48-64+ oz of fluids/day- between meals only.  4) Eat slowly (>20 min/meal), chewing foods well (to applesauce-like consistency).  5) Limit  "portions to ~1/2 - 3/4 cup/meal until 6 months post op.  6) Take supplements as recommended   - Look for calcium citrate   - Separate calcium and iron (in MVI and iron on side) by at least 2 hrs due to interaction  7) Schedule follow up with Kaya or Aida at [phone]  8) Get 3 month labs done. Please send us fax number and we will fax them to your clinic.    Post-op Diet Handouts:  Diet Guidelines after Weight-loss Surgery  [email or web address]      Your Stage 5 Diet: Regular Foods  [email or web address]     Keeping Up Your Diet after Weight Loss Surgery  [email or web address]    Exercises after Weight Loss Surgery (strengthening, when no weight lifting restrictions)  [email or web address]    Why Take Supplements for Life after Weight Loss Surgery  [email or web address]      Preventing Low Blood Sugar after Weight Loss Surgery  [email or web address]     Preventing Dumping Syndrome after Weight Loss Surgery  [email or web address]       Additional resources:      Dish Sets: bariatric meal size bowls and plates with built in measurement designs on the dishes  https://store.[employer]/collections/bariatric-dinnerware     Books:  \"Fresh Start Bariatric Cookbook\" by Lissette Maya, MS, RDN, CD  - Excellent cookbook with post-op recipes and many other resources to check out for ongoing support after surgery     \"Eating Well After Weight Loss Surgery\" by Daphnie Patel and Karri Guthrie  - Great cookbook with recipes for each diet stage after surgery and recommended portion sizes. Slightly more intensive cooking recipes.     \"Bariatric Mindset Success\" by Anastasia Milligan - on Amazon - book that helps with prevention of weight regain after surgery. Helps train your brain for long term success with weight loss after surgery.     Post-Bariatric Surgery Online Recipe Resources:  Online:  Simpler Recipes: " https://www.Overwolf/bariatric-surgery/recipes     Wonton cups and muffin tin portion sized recipes: https://www.Incuron.CodeNgo/blog/10-single-serving-meals-you-need-in-your-bariatric-life/     Some recipes on this site have larger than 1 cup portion size pictures: http://www.[facility].org/weight-loss-surgery/nutrition/recipe-corner.html               https://www.5 Star Quarterback.me/25-bariatric-friendly-weeknight-meals/     Crockpot recipes: https://www.5 Star Quarterback.me/25-bariatric-friendly-crockpot-recipes/     https://Currensee/recipes/      https://www.Ubookoo/mbd-recipes          Follow-Up: 3 months (6 months post op)    Time spent with patient: 13 minutes.  GLYNN Christensen, RD LD

## 2021-12-29 ENCOUNTER — VIRTUAL VISIT (OUTPATIENT)
Dept: ENDOCRINOLOGY | Facility: CLINIC | Age: 21
End: 2021-12-29
Payer: COMMERCIAL

## 2021-12-29 VITALS — WEIGHT: 164 LBS | BODY MASS INDEX: 29.05 KG/M2

## 2021-12-29 DIAGNOSIS — Z71.3 NUTRITIONAL COUNSELING: Primary | ICD-10-CM

## 2021-12-29 DIAGNOSIS — Z98.84 S/P GASTRIC BYPASS: ICD-10-CM

## 2021-12-29 DIAGNOSIS — Z86.2 HX OF IRON DEFICIENCY ANEMIA: ICD-10-CM

## 2021-12-29 DIAGNOSIS — E66.3 OVERWEIGHT (BMI 25.0-29.9): ICD-10-CM

## 2021-12-29 PROCEDURE — 97803 MED NUTRITION INDIV SUBSEQ: CPT | Mod: 95 | Performed by: DIETITIAN, REGISTERED

## 2021-12-29 NOTE — PATIENT INSTRUCTIONS
"Goals:  1) Follow bariatric regular diet.   2) Consume 60+ grams of protein/day.  3) Sip on 48-64+ oz of fluids/day- between meals only.  4) Eat slowly (>20 min/meal), chewing foods well (to applesauce-like consistency).  5) Limit portions to ~1/2 - 3/4 cup/meal until 6 months post op.  6) Take supplements as recommended   - Look for calcium citrate   - Separate calcium and iron (in MVI and iron on side) by at least 2 hrs due to interaction  7) Schedule follow up with Kaya or Aida at [phone]  8) Get 3 month labs done. Please send us fax number and we will fax them to your clinic.    Post-op Diet Handouts:  Diet Guidelines after Weight-loss Surgery  [email or web address]      Your Stage 5 Diet: Regular Foods  [email or web address]     Keeping Up Your Diet after Weight Loss Surgery  [email or web address]    Exercises after Weight Loss Surgery (strengthening, when no weight lifting restrictions)  [email or web address]    Why Take Supplements for Life after Weight Loss Surgery  [email or web address]      Preventing Low Blood Sugar after Weight Loss Surgery  [email or web address]     Preventing Dumping Syndrome after Weight Loss Surgery  [email or web address]       Additional resources:      Dish Sets: bariatric meal size bowls and plates with built in measurement designs on the dishes  https://store.GITR/collections/bariatric-dinnerware     Books:  \"Fresh Start Bariatric Cookbook\" by Lissette Maya, MS, RDN, CD  - Excellent cookbook with post-op recipes and many other resources to check out for ongoing support after surgery     \"Eating Well After Weight Loss Surgery\" by Daphnie Patel and Karri Moses-Surekha  - Great cookbook with recipes for each diet stage after surgery and recommended portion sizes. Slightly more intensive cooking recipes.     \"Bariatric Mindset Success\" by Anastasia Milligan - on Amazon - book that helps with prevention of " weight regain after surgery. Helps train your brain for long term success with weight loss after surgery.     Post-Bariatric Surgery Online Recipe Resources:  Online:  Simpler Recipes: https://www.eWings.com/bariatric-surgery/recipes     Wonton cups and muffin tin portion sized recipes: https://www.TraceSecurity/blog/10-single-serving-meals-you-need-in-your-bariatric-life/     Some recipes on this site have larger than 1 cup portion size pictures: http://www.[facility].org/weight-loss-surgery/nutrition/recipe-corner.html               https://www.Quality Solicitors.me/25-bariatric-friendly-weeknight-meals/     Crockpot recipes: https://www.Quality Solicitors.me/25-bariatric-friendly-crockpot-recipes/     https://GlobaTrek/recipes/      https://www.Spot formerly PlacePop.Dolphin Digital Media/mbd-recipes          Follow-Up: 3 months (6 months post op)    GLYNN Logan, RD, LD  Clinic #: [phone]

## 2021-12-29 NOTE — LETTER
"12/29/2021       RE: Minerva Finch  [address] [facility] [facility] [address]     Dear Colleague,    Thank you for referring your patient, Minerva Finch, to the [facility] [place] at [facility]. Please see a copy of my visit note below.    Minerva Finch is a 21 year old female who is being evaluated via a billable video visit.      The patient has been notified of following:     \"This video visit will be conducted via a call between you and your physician/provider. We have found that certain health care needs can be provided without the need for an in-person physical exam.  This service lets us provide the care you need with a video conversation.  If a prescription is necessary we can send it directly to your pharmacy.  If lab work is needed we can place an order for that and you can then stop by our lab to have the test done at a later time.    Video visits are billed at different rates depending on your insurance coverage.  Please reach out to your insurance provider with any questions.    If during the course of the call the physician/provider feels a video visit is not appropriate, you will not be charged for this service.\"    Patient has given verbal consent for Video visit? yes  How would you like to obtain your AVS? MyChart  If you are dropped from the video visit, the video invite should be resent to: [phone]  Will anyone else be joining your video visit? No  {If patient encounters technical issues they should call [phone]      Video-Visit Details    Type of service:  Video Visit    Video Start Time:  8:40 am  Video End Time: 8:53 am     Originating Location (pt. Location): Home     Distant Location (provider location):  [facility] [place]      Platform used for Video Visit: InsideSales.com     During this virtual visit the patient is located in [place], patient verifies this as the " "location during the entirety of this visit.       Nutrition Reassessment  Reason For Visit:  Minerva Finch is a 21 year old female presenting today for nutrition follow-up, 3 month s/p RNYGB with Dr Gallegos on 9/24/21.     Patient referred by Dr. Gallegos on 9/24/21.        Anthropometrics:  Initial Consult Weight 8/6/2020: 215 lbs    *Pt gained weight prior to surgery with a weight of 229 lbs reported on 5/27/21 (BMI 40.57)   Pt seen on site by GAVIN Baron on 9/10/21 with weight of 227, reported weight of 215 on her home scale 9/7/21.     Day of Surgery Weight: 218 lbs with BMI 38.74     Estimated body mass index is 34.9 kg/m  as calculated from the following:    Height as of 10/25/21: 1.6 m (5' 3\").    Weight as of 10/25/21: 89.4 kg (197 lb).    Current weight: 164 lbs, pt reported    Weight loss: -51 lbs (23.72%) from initial consult; -54 lbs from day of surgery    Current Vitamins/Minerals  [place] chewable MVI with iron 10 mg per pill per pt, has been taking 2x/day  - Vit A 400 mcg, pill   - Copper .44 mg per pill  B12 injection   Additional iron - not sure dose off hand.     *Aware of need to separate Ca and iron by 2 hrs     Hx of anemia - has had blood transfusions. Will plan to increase to 60 mg iron/day with additional iron supplementation. Platelet count in hospital day after surgery was normal     Due for labs. Still needs to get calcium citrate - was unable to find chewable form.    Nutrition History:  Things going well today per pt. No dietary concerns or questions.     Pt did have 2 Recent ED visits - first one resulted in pt having her gallbladder removed on Nov 17th. Pt was still struggling to keep food down following this visit and returned to ED. Had EGD done - please see ED notes for more detail.  Now has a stent per pt.  Feeling much better right now. On soft/puree.   Planning on stent out Feb 7th.    Typical day:  2 protein shakes/day  1 egg with string cheese  Turkey or ham roll up  Dinner " varies     Avoids greasy foods     Progress with Previous Goals:  1) Follow soft diet for 4 weeks, then to progress to bariatric regular diet  - On puree/soft right now due to stent.  2) Consume 60 grams of protein/day. - Met, getting 60 grams minimum. Doing 2 protein shakes/day and meat protein.  3) Sip on 48-64 oz of fluids/day- between meals only  *Okay to drink fluids before meal time. If causing issues, go back to none before.  - Met getting 60-70 oz/day. Having little sips before food and not during or after.  4) Eat slowly (>20 min/meal), chewing foods well (to applesauce-like consistency). - Met  5) Limit portions to ~1/2 cup/meal until 3 months post op  6) -Take the following after a Rashida-en-y Gastric Bypass:    Multivitamin/minerals: adult dose 2 times daily    Iron: 45-60 mg elemental daily  - may partly or fully be covered in multivitamin     Calcium Citrate containing vitamin D: 500 mg 3 times daily or 600 mg 2 times daily    Vitamin B12: sublingual form of at least 500 mcg daily or injection of 1000 mcg monthly   Obtain additional iron to meet 60 mg/day. - Met  Obtain ca citrate chewable - look online - Not yet   B12 injection once at pharmacy - Met    Nutrition Prescription:  Grams Protein: 60 (minimum)   Amount of Fluid: 48-64 oz    Nutrition Diagnosis  Previous: Food and nutrition-related knowledge deficit r/t lack of prior exposure to diet instruction beyond 3 months s/p SG as evidenced by Pt seeking further guidance from RD on diet instruction beyond 3 months s/p SG.     Current: Food and nutrition-related knowledge deficit r/t lack of prior exposure to diet instruction beyond 6 months s/p SG as evidenced by Pt seeking further guidance from RD on diet instruction beyond 6 months s/p SG.     Intervention  Materials/Education provided, reviewed bariatric regular consistency diet, protein intake, fluid intake, eating pace, chewing foods well, portion control, sugar/fat intake, recommended  "vitamin/mineral supplements. Patient demonstrates understanding.     Expected Engagement: good    Goals:  1) Follow bariatric regular diet.   2) Consume 60+ grams of protein/day.  3) Sip on 48-64+ oz of fluids/day- between meals only.  4) Eat slowly (>20 min/meal), chewing foods well (to applesauce-like consistency).  5) Limit portions to ~1/2 - 3/4 cup/meal until 6 months post op.  6) Take supplements as recommended   - Look for calcium citrate   - Separate calcium and iron (in MVI and iron on side) by at least 2 hrs due to interaction  7) Schedule follow up with Kaya or Aida at [phone]  8) Get 3 month labs done. Please send us fax number and we will fax them to your clinic.    Post-op Diet Handouts:  Diet Guidelines after Weight-loss Surgery  [email or web address]      Your Stage 5 Diet: Regular Foods  [email or web address]     Keeping Up Your Diet after Weight Loss Surgery  [email or web address]    Exercises after Weight Loss Surgery (strengthening, when no weight lifting restrictions)  [email or web address]    Why Take Supplements for Life after Weight Loss Surgery  [email or web address]      Preventing Low Blood Sugar after Weight Loss Surgery  [email or web address]     Preventing Dumping Syndrome after Weight Loss Surgery  [email or web address]       Additional resources:      Dish Sets: bariatric meal size bowls and plates with built in measurement designs on the dishes  https://store.Powerit Solutions.Electro-Petroleum/collections/bariatric-dinnerware     Books:  \"Fresh Start Bariatric Cookbook\" by Lissette Maya, MS, RDN, CD  - Excellent cookbook with post-op recipes and many other resources to check out for ongoing support after surgery     \"Eating Well After Weight Loss Surgery\" by Daphnie Patel and Karri Guthrie  - Great cookbook with recipes for each diet stage after surgery and recommended portion sizes. Slightly more intensive cooking " "recipes.     \"Bariatric Mindset Success\" by Anastasia Milligan - on Amazon - book that helps with prevention of weight regain after surgery. Helps train your brain for long term success with weight loss after surgery.     Post-Bariatric Surgery Online Recipe Resources:  Online:  Simpler Recipes: https://www.1001 Menus/bariatric-surgery/recipes     Wonton cups and muffin tin portion sized recipes: https://www.Enprise Solutions/blog/10-single-serving-meals-you-need-in-your-bariatric-life/     Some recipes on this site have larger than 1 cup portion size pictures: http://www.[facility].org/weight-loss-surgery/nutrition/recipe-corner.html               https://www.C7 Data Centers.me/25-bariatric-friendly-weeknight-meals/     Crockpot recipes: https://www.C[employer] Data Centers.me/25-bariatric-friendly-crockpot-recipes/     https://Yelago/recipes/      https://www.DesiCrew Solutions.Good Start Genetics/mbd-recipes          Follow-Up: 3 months (6 months post op)    Time spent with patient: 13 minutes.          Again, thank you for allowing me to participate in the care of your patient.      Sincerely,    Nolvia Gonzalez RD      "

## 2022-01-01 NOTE — TELEPHONE ENCOUNTER
Levsin prescriptions sent to pharmacy via written order from Kaya Richmond NP.  Patient notified of new prescription and how to take.  Patient will call office tomorrow to report on how she is doing.   Statement Selected

## 2022-01-03 ENCOUNTER — TRANSFERRED RECORDS (OUTPATIENT)
Dept: HEALTH INFORMATION MANAGEMENT | Facility: CLINIC | Age: 22
End: 2022-01-03
Payer: COMMERCIAL

## 2022-01-04 ENCOUNTER — TELEPHONE (OUTPATIENT)
Dept: ENDOCRINOLOGY | Facility: CLINIC | Age: 22
End: 2022-01-04
Payer: COMMERCIAL

## 2022-01-04 NOTE — TELEPHONE ENCOUNTER
Received out side labs from [facility]  [address] [facility] [address]   [phone]  Sent to Archive and scan into patient chart sent to provider

## 2022-01-08 ENCOUNTER — HEALTH MAINTENANCE LETTER (OUTPATIENT)
Age: 22
End: 2022-01-08

## 2022-01-10 ENCOUNTER — TELEPHONE (OUTPATIENT)
Dept: ENDOCRINOLOGY | Facility: CLINIC | Age: 22
End: 2022-01-10

## 2022-01-12 ENCOUNTER — TELEPHONE (OUTPATIENT)
Dept: ENDOCRINOLOGY | Facility: CLINIC | Age: 22
End: 2022-01-12
Payer: COMMERCIAL

## 2022-01-12 NOTE — TELEPHONE ENCOUNTER
[facility] Results  Labs  B-12 630  Ferritin 26  25-oh Vitamin d 24.0  A1C 4.8    Lipoprotein Profile    Chol    137  Trig      124  HDL      33  LDL (direct)  67  Fasting Patient Fasting    See scan in chart results

## 2022-02-03 ENCOUNTER — VIRTUAL VISIT (OUTPATIENT)
Dept: ENDOCRINOLOGY | Facility: CLINIC | Age: 22
End: 2022-02-03
Payer: COMMERCIAL

## 2022-02-03 VITALS — WEIGHT: 163 LBS | HEIGHT: 63 IN | BODY MASS INDEX: 28.88 KG/M2

## 2022-02-03 DIAGNOSIS — Z98.84 S/P GASTRIC BYPASS: ICD-10-CM

## 2022-02-03 PROCEDURE — 99215 OFFICE O/P EST HI 40 MIN: CPT | Mod: 95 | Performed by: NURSE PRACTITIONER

## 2022-02-03 ASSESSMENT — MIFFLIN-ST. JEOR: SCORE: 1473.49

## 2022-02-03 NOTE — PROGRESS NOTES
Return Bariatric Surgery Note    RE: Minerva Finch  MR#: [id number]  : 2000  VISIT DATE: Feb 3, 2022       Dear Jerri Miller,    I had the pleasure of seeing your patient, Minerva Finch, in my post-bariatric surgery assessment clinic.    Assessment & Plan   Problem List Items Addressed This Visit        Digestive    S/P gastric bypass     4 months s/p LRNYGB with Dr. Gallegos for reflux. No reflux since surgery. Continues aciphex per GI. Will need to determine if this can be stopped now. Since last seen 10/2021 she had lap roro due to cholelithiasis. 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 Now is planning for stent removal this coming Monday.     Nausea and vomiting has resolved. No diarrhea/ constipation. No dysphagia, no reflux.     Continues to take vitamins as prescribed. PTH recent elevated so will increase calcium. Adequate protein and hydration daily.     Notes food aversion since surgery. Nothing sounds appealing and doesn't want to eat. This is new to her. She is working with a therapist.     Plan:   Continue reflux meds   Continue vitamins  Increase calcium   We will look for labs and if not all drawn- draw labs Monday while in for stent removal  Follow up 3/30/2022                    40 minutes spent on the date of the encounter doing chart review, history and exam, documentation and further activities per the note    CHIEF COMPLAINT: Post-bariatric surgery follow-up    HISTORY OF PRESENT ILLNESS:  Questions Regarding Prior Weight Loss Surgery Reviewed With Patient 2/3/2022   I had the following weight loss procedure: Rashida-en-y Gastric Bypass   What year was your surgery?    How has your weight changed since your last visit? I have lost weight   Are you currently taking any weight loss medications? No   Do you currently have any of the following: None of the above   Have you been to the Emergency room since your  last visit with us? Yes   Were you in the hospital since your last visit with us? Yes   Do you have any concerns today? No     S/p LRNYGB 9/24/2021- Dr. Gallegos   Last seen with Aida Zacarias PA-C 10/25/2021  Dysphagia and nausea/ vomiting 11/27/2021- EGD   Wendi in 11/2021   Anastomotic stricture of gastriojejunostomy 12/2021 -11/28 and 12/14 stent placed with dilation on second EGD.     Planning to have stent removed Monday     No dysphagia, no nausea/ vomiting    Doesn't find eating appealing. over thinking it and then crying. Working with therapist.     No constipation/ diarrhea     At least 60g protein daily   Hydration- minimum of 60oz water daily   2 flinstone vitamins daily, iron, calcium 2 daily     Weight History:     2/3/2022   What is your highest lifetime weight? 245   What is your lowest weight since surgery? (In pounds) 163     Initial Weight (lbs): 215 lbs  Weight: 73.9 kg (163 lb)  Last Visits Weight: 97.5 kg (215 lb)  Cumulative weight loss (lbs): 52  Weight Loss Percentage: 24.19%    Questions Regarding Co-Morbidities and Health Concerns Reviewed With Patient 2/3/2022   Pre-diabetes: Never   Diabetes II: Never   High Blood Pressure: Never   High cholesterol: Never   Heartburn/Reflux: Gone away   Sleep apnea: Never   PCOS: Never   Back pain: Never   Joint pain: Never   Lower leg swelling: Never       Eating Habits 2/3/2022   How many meals do you eat per day? 3   Do you snack between meals? Yes   How much food are you eating at each meal? 1/2 cup to 1 cup   Are you able to separate your meals and liquids by at least 30 minutes? Yes   Are you able to avoid liquid calories? Yes     Sometimes having snack between meals to get protein up     Exercise Questions Reviewed With Patient 2/3/2022   How often do you exercise? 1 to 2 times per week   What is the duration of your exercise (in minutes)? 15 Minutes   What types of exercise do you do? walking, running, home gym   What keeps you from being more  "active?  I should be more active but I just have not gotten around to it, Lack of Time       Social History:      2/3/2022   Are you smoking? No   Are you drinking alcohol? No       Medications:  Current Outpatient Medications   Medication     cyanocobalamin (CYANOCOBALAMIN) 1000 MCG/ML injection     FLUoxetine (PROZAC) 40 MG capsule     hydrOXYzine (ATARAX) 25 MG tablet     medroxyPROGESTERone (DEPO-PROVERA) 150 MG/ML IM injection     ondansetron (ZOFRAN-ODT) 4 MG ODT tab     RABEprazole (ACIPHEX) 20 MG EC tablet     Syringe/Needle, Disp, (BD ECLIPSE SYRINGE) 25G X 5/8\" 3 ML MISC     methocarbamol (ROBAXIN) 500 MG tablet     No current facility-administered medications for this visit.     Facility-Administered Medications Ordered in Other Visits   Medication     barium sulfate (EZ-DISK) tablet 700 mg     lidocaine (XYLOCAINE) 2 % topical gel     sod bicarbonate-citric acid-simethicone (EZ GAS) 2.21-1.53-0.04 g packet 4 g         2/3/2022   Do you avoid NSAIDs such as (Ibuprofen, Aleve, Naproxen, Advil)?   Yes       ROS:  GI:      2/3/2022   Vomiting: No   Diarrhea: No   Constipation: No   Swallowing trouble: No   Abdominal pain: No   Heartburn: No   Rash in skin folds: No   Depression: No   Stress urinary incontinence No     Skin:   BAR RBS ROS - SKIN 2/3/2022   Rash in skin folds: No     Psych:      2/3/2022   Depression: No   Anxiety: No     Female Only:   BAR RBS ROS - FEMALE ONLY 2/3/2022   Female only: Birth control                                                                                     Findings:      LA Grade C (one or more mucosal breaks continuous between tops of 2 or       more mucosal folds, less than 75% circumference) esophagitis with self       limited contact hemorrhage was found in the lower third of the esophagus.      Evidence of a gastric bypass was found in the gastric body. This was       characterized by edema, erosion, friable mucosa, inflammation, an intact       appearance, " "severe stenosis and an intact staple line. This was stented       with [employer] LAMS [id number] under fluoroscopic guidance. A TTS dilator was       passed through the scope. Dilation with an 8-9-10 mm anastomotic balloon       dilator was performed under fluoroscopic guidance.      The examined jejunum was normal.                                                                                  Impression:      - LA Grade C reflux esophagitis with self limited contact hemorrhage.      - A gastric bypass was found, characterized by friable mucosa,       inflammation, an intact appearance, edema, erosion, severe stenosis and       an intact staple line. Prosthesis placed. Dilated.      - Normal examined jejunum.      - No specimens collected.          PHYSICAL EXAM:  Objective    Ht 1.6 m (5' 3\")   Wt 73.9 kg (163 lb)   BMI 28.87 kg/m           Vitals:  No vitals were obtained today due to virtual visit.    Physical Exam   GENERAL: Healthy, alert and no distress  EYES: Eyes grossly normal to inspection.  No discharge or erythema, or obvious scleral/conjunctival abnormalities.  RESP: No audible wheeze, cough, or visible cyanosis.  No visible retractions or increased work of breathing.    SKIN: Visible skin clear. No significant rash, abnormal pigmentation or lesions.  NEURO: Cranial nerves grossly intact.  Mentation and speech appropriate for age.  PSYCH: Mentation appears normal, affect normal/bright, judgement and insight intact, normal speech and appearance well-groomed.        Sincerely,    Kaya Richmond NP    "

## 2022-02-03 NOTE — ASSESSMENT & PLAN NOTE
4 months s/p LRNYGB with Dr. Gallegos for reflux. No reflux since surgery. Continues aciphex per GI. Will need to determine if this can be stopped now. Since last seen 10/2021 she had lap roro due to cholelithiasis. She subsequently had EGD for gastrogejunostomy stricture 11/28 and 12/14 stent placed with dilation on second EGD. Now is planning for stent removal this coming Monday.     Nausea and vomiting has resolved. No diarrhea/ constipation. No dysphagia, no reflux.     Continues to take vitamins as prescribed. PTH recent elevated so will increase calcium. Adequate protein and hydration daily.     Notes food aversion since surgery. Nothing sounds appealing and doesn't want to eat. This is new to her. She is working with a therapist.     Plan:   Continue reflux meds   Continue vitamins  Increase calcium   We will look for labs and if not all drawn- draw labs Monday while in for stent removal  Follow up 3/30/2022

## 2022-02-03 NOTE — PROGRESS NOTES
During this virtual visit the patient is located in [place], patient verifies this as the location during the entirety of this visit.     Minerva is a 21 year old who is being evaluated via a billable video visit.      How would you like to obtain your AVS? MyChart  If the video visit is dropped, the invitation should be resent by: Text to cell phone:  [phone]  Will anyone else be joining your video visit? No      Video Start Time: 1358  Video-Visit Details    Type of service:  Video Visit    Video End Time:1413    Originating Location (pt. Location): Home    Distant Location (provider location):  [facility] [place]     Platform used for Video Visit: Ann-Marie Rankin NREMT

## 2022-02-03 NOTE — LETTER
2/3/2022       RE: Minerva Finch  [address] [facility] [address]     Dear Colleague,    Thank you for referring your patient, Minerva Finch, to the [facility] [place] at [facility]. Please see a copy of my visit note below.    During this virtual visit the patient is located in [place], patient verifies this as the location during the entirety of this visit.     Minerva is a 21 year old who is being evaluated via a billable video visit.      How would you like to obtain your AVS? MyChart  If the video visit is dropped, the invitation should be resent by: Text to cell phone:  [phone]  Will anyone else be joining your video visit? No      Video Start Time: 1358  Video-Visit Details    Type of service:  Video Visit    Video End Time:1413    Originating Location (pt. Location): Home    Distant Location (provider location):  [facility] [place]     Platform used for Video Visit: Ann-Marie Rankin NREMT        Return Bariatric Surgery Note    RE: Minerva Finch  MR#: [id number]  : 2000  VISIT DATE: Feb 3, 2022       Dear Jerri Miller,    I had the pleasure of seeing your patient, Minerva Finch, in my post-bariatric surgery assessment clinic.    Assessment & Plan   Problem List Items Addressed This Visit        Digestive    S/P gastric bypass     4 months s/p LRNYGB with Dr. Gallegos for reflux. No reflux since surgery. Continues aciphex per GI. Will need to determine if this can be stopped now. Since last seen 10/2021 she had lap roro due to cholelithiasis. 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 Now is planning for stent removal this coming Monday.     Nausea and vomiting has resolved. No diarrhea/ constipation. No dysphagia, no reflux.     Continues to take vitamins as prescribed. PTH  recent elevated so will increase calcium. Adequate protein and hydration daily.     Notes food aversion since surgery. Nothing sounds appealing and doesn't want to eat. This is new to her. She is working with a therapist.     Plan:   Continue reflux meds   Continue vitamins  Increase calcium   We will look for labs and if not all drawn- draw labs Monday while in for stent removal  Follow up 3/30/2022                    40 minutes spent on the date of the encounter doing chart review, history and exam, documentation and further activities per the note    CHIEF COMPLAINT: Post-bariatric surgery follow-up    HISTORY OF PRESENT ILLNESS:  Questions Regarding Prior Weight Loss Surgery Reviewed With Patient 2/3/2022   I had the following weight loss procedure: Rashida-en-y Gastric Bypass   What year was your surgery? 2020   How has your weight changed since your last visit? I have lost weight   Are you currently taking any weight loss medications? No   Do you currently have any of the following: None of the above   Have you been to the Emergency room since your last visit with us? Yes   Were you in the hospital since your last visit with us? Yes   Do you have any concerns today? No     S/p LRNYGB 9/24/2021- Dr. Gallegos   Last seen with Aida Zacarias PA-C 10/25/2021  Dysphagia and nausea/ vomiting 11/27/2021- EGD   Wendi in 11/2021   Anastomotic stricture of gastriojejunostomy 12/2021 -11/28 and 12/14 stent placed with dilation on second EGD.     Planning to have stent removed Monday     No dysphagia, no nausea/ vomiting    Doesn't find eating appealing. over thinking it and then crying. Working with therapist.     No constipation/ diarrhea     At least 60g protein daily   Hydration- minimum of 60oz water daily   2 flinstone vitamins daily, iron, calcium 2 daily     Weight History:     2/3/2022   What is your highest lifetime weight? 245   What is your lowest weight since surgery? (In pounds) 163     Initial Weight (lbs): 215  "lbs  Weight: 73.9 kg (163 lb)  Last Visits Weight: 97.5 kg (215 lb)  Cumulative weight loss (lbs): 52  Weight Loss Percentage: 24.19%    Questions Regarding Co-Morbidities and Health Concerns Reviewed With Patient 2/3/2022   Pre-diabetes: Never   Diabetes II: Never   High Blood Pressure: Never   High cholesterol: Never   Heartburn/Reflux: Gone away   Sleep apnea: Never   PCOS: Never   Back pain: Never   Joint pain: Never   Lower leg swelling: Never       Eating Habits 2/3/2022   How many meals do you eat per day? 3   Do you snack between meals? Yes   How much food are you eating at each meal? 1/2 cup to 1 cup   Are you able to separate your meals and liquids by at least 30 minutes? Yes   Are you able to avoid liquid calories? Yes     Sometimes having snack between meals to get protein up     Exercise Questions Reviewed With Patient 2/3/2022   How often do you exercise? 1 to 2 times per week   What is the duration of your exercise (in minutes)? 15 Minutes   What types of exercise do you do? walking, running, home gym   What keeps you from being more active?  I should be more active but I just have not gotten around to it, Lack of Time       Social History:      2/3/2022   Are you smoking? No   Are you drinking alcohol? No       Medications:  Current Outpatient Medications   Medication     cyanocobalamin (CYANOCOBALAMIN) 1000 MCG/ML injection     FLUoxetine (PROZAC) 40 MG capsule     hydrOXYzine (ATARAX) 25 MG tablet     medroxyPROGESTERone (DEPO-PROVERA) 150 MG/ML IM injection     ondansetron (ZOFRAN-ODT) 4 MG ODT tab     RABEprazole (ACIPHEX) 20 MG EC tablet     Syringe/Needle, Disp, (BD ECLIPSE SYRINGE) 25G X 5/8\" 3 ML MISC     methocarbamol (ROBAXIN) 500 MG tablet     No current facility-administered medications for this visit.     Facility-Administered Medications Ordered in Other Visits   Medication     barium sulfate (EZ-DISK) tablet 700 mg     lidocaine (XYLOCAINE) 2 % topical gel     sod bicarbonate-citric " "acid-simethicone (EZ GAS) 2.21-1.53-0.04 g packet 4 g         2/3/2022   Do you avoid NSAIDs such as (Ibuprofen, Aleve, Naproxen, Advil)?   Yes       ROS:  GI:      2/3/2022   Vomiting: No   Diarrhea: No   Constipation: No   Swallowing trouble: No   Abdominal pain: No   Heartburn: No   Rash in skin folds: No   Depression: No   Stress urinary incontinence No     Skin:   BAR RBS ROS - SKIN 2/3/2022   Rash in skin folds: No     Psych:      2/3/2022   Depression: No   Anxiety: No     Female Only:   BAR RBS ROS - FEMALE ONLY 2/3/2022   Female only: Birth control                                                                                     Findings:      LA Grade C (one or more mucosal breaks continuous between tops of 2 or       more mucosal folds, less than 75% circumference) esophagitis with self       limited contact hemorrhage was found in the lower third of the esophagus.      Evidence of a gastric bypass was found in the gastric body. This was       characterized by edema, erosion, friable mucosa, inflammation, an intact       appearance, severe stenosis and an intact staple line. This was stented       with [employer]xios LAMS [id number] under fluoroscopic guidance. A TTS dilator was       passed through the scope. Dilation with an 8-9-10 mm anastomotic balloon       dilator was performed under fluoroscopic guidance.      The examined jejunum was normal.                                                                                  Impression:      - LA Grade C reflux esophagitis with self limited contact hemorrhage.      - A gastric bypass was found, characterized by friable mucosa,       inflammation, an intact appearance, edema, erosion, severe stenosis and       an intact staple line. Prosthesis placed. Dilated.      - Normal examined jejunum.      - No specimens collected.          PHYSICAL EXAM:  Objective    Ht 1.6 m (5' 3\")   Wt 73.9 kg (163 lb)   BMI 28.87 kg/m           Vitals:  No vitals were obtained " today due to virtual visit.    Physical Exam   GENERAL: Healthy, alert and no distress  EYES: Eyes grossly normal to inspection.  No discharge or erythema, or obvious scleral/conjunctival abnormalities.  RESP: No audible wheeze, cough, or visible cyanosis.  No visible retractions or increased work of breathing.    SKIN: Visible skin clear. No significant rash, abnormal pigmentation or lesions.  NEURO: Cranial nerves grossly intact.  Mentation and speech appropriate for age.  PSYCH: Mentation appears normal, affect normal/bright, judgement and insight intact, normal speech and appearance well-groomed.        Sincerely,    Kaya Richmond NP

## 2022-02-03 NOTE — PATIENT INSTRUCTIONS
"Thank you for allowing us the privilege of caring for you. We hope we provided you with the excellent service you deserve.   Please let us know if there is anything else we can do for you so that we can be sure you are completely satisfied with your care experience.    To ensure the quality of our services you may be receiving a patient satisfaction survey from an independent patient satisfaction monitoring company.    The greatest compliment you can give is a \"Likely to Recommend\"    Your visit was with Kaya Richmond NP today.    Instructions per today's visit:     Shad Finch, it was great to visit with you today.  Here is a review of our visit.  If our clinic scheduler is not able to reach you please call [phone] to schedule your next appointments.        Plan:   Continue reflux meds   Continue vitamins  Increase calcium   We will look for labs and if not all drawn- draw labs Monday while in for stent removal  Follow up 3/30/2022    Information about Video Visits with [employer]: video visit information  _________________________________________________________________________________________________________________________________________________________  If you are asked by your clinic team to have your blood pressure checked:  [facility] do offer several locations for blood pressure checks. Please follow the below link to schedule an appointment. Scheduling an appointment at the pharmacy for a blood pressure check is now preferred.    Appointment Plus (appointment-plus.[place])  _________________________________________________________________________________________________________________________________________________________  Important contact and scheduling information:  Please call our contact center at [phone] to schedule your next appointments.  To find a lab location near you, please call [phone].  For any nursing questions or concerns call Lucy Anderson LPN at " [phone] or Loraine Astorga RN at [phone]  Please call during clinic hours Monday through Friday 8:00a - 4:00p if you have questions or you can contact us via [employer] at anytime and we will reply during clinic hours.    Lab results will be communicated through My Chart or letter (if My Chart not used). Please call the clinic if you have not received communication after 1 week or if you have any questions.?  Clinic Fax: [phone]    _________________________________________________________________________________________________________________________________________________________  Meal Replacement Products:    Here is the link to our new e-store where you can purchase our meal replacement products    [facility] E-Store  [employer]/store    The one week starter kit is a great way to sample a variety of products and see what works for you.    If you want more information about the product go to: [employer]    If you are an employee or [facility] or [facility] please contact your care team for a 10% estore discount    Free Shipping for orders over $75     Benefits of meal replacements products:    Portion and calorie control  Improved nutrition  Structured eating  Simplified food choices  Avoid contact with trigger foods  _________________________________________________________________________________________________________________________________________________________  Interested in working with a health ?  Health coaches work with you to improve your overall health and wellbeing.  They look at the whole person, and may involve discussion of different areas of life, including, but not limited to the four pillars of health (sleep, exercise, nutrition, and stress management). Discuss with your care team if you would like to start working a health .  Health Coaching-3 Pack: Schedule by calling [phone]    $99 for three  health coaching visits    Visits may be done in person or via phone    Coaching is a partnership between the  and the client; Coaches do not prescribe or diagnose    Coaching helps inspire the client to reach his/her personal goals   _________________________________________________________________________________________________________________________________________________________  24 Week Healthy Lifestyle Plan:    Our mission in the 24-week Healthy Lifestyle Plan is to provide you with individualized care by giving you the tools, education and support you need to lose weight and maintain a healthy lifestyle. In your 24-week journey, you ll be supported by a dedicated weight loss team that includes registered dietitians, medical weight management providers, health coaches, and nurses -- all with special expertise in weight loss -- to help you every step of the way.     Monthly meetings with your registered dietician or medical weight management provider help to review your progress, update your care plan, and make any adjustments needed to ensure success. Between these visits, weekly and bi-weekly health  visits will help you focus on the four pillars of weight loss -- stress, sleep, nutrition, and exercise -- and how you can best adapt each to achieve sustainable weight loss results.    In addition, you will be given exclusive access to online wellbeing classes through [employer].  Your initial visit will be with a medical weight management provider who will help to understand your weight loss goals and ensure this program is the right fit for you. Please let our team know if you are interested in the 24 week plan by sending a message to your care team or calling [phone] to schedule.  _________________________________________________________________________________________________________________________________________________________    COMPREHENSIVE WEIGHT MANAGEMENT PROGRAM  VIRTUAL SUPPORT  "GROUPS    For Support Group Information:      We offer support groups for patients who are working on weight loss and considering, preparing for or have had weight loss surgery.   There is no cost for this opportunity.  You are invited to attend the?Virtual Support Groups?provided by any of the following locations:    1. [facility] via Microsoft Teams with Sharonda Najera RN  2.   [facility] via [employer] with Jesus Gunderson, PhD, LP  3.   [facility] via [employer] with Arielle Dominguez RN  4.   [facility] via Prot-On Teams with Arielle Antonio [facility]    The following Support Group information can also be found on our website:  [email or web address]    [facility] Weight Loss Surgery Support Group    [facility] Weight Loss Surgery Support Group  The support group is a patient-lead forum that meets monthly to share experiences, encouragement and education. It is open to those who have had weight loss surgery, are scheduled for surgery, and those who are considering surgery.   WHEN: This group meets on the 3rd Wednesday of each month from 5:00PM - 6:00PM virtually using Microsoft Teams.   FACILITATOR: Led by Sharonda Najera RD, LD, RN, the program's Clinical Coordinator.   TO REGISTER: Please contact the clinic via [place] or call the nurse line directly at [phone] to inform our staff that you would like an invite sent to you and to let us know the email you would like the invite sent to. Prior to the meeting, a link with directions on how to join the meeting will be sent to you.    2022 Meetings  January 19: \"Let's Talk\" a time for the group to share.  February 16: \"Let's Talk\" a time for the group to share.  March 16: Guest Speakers: Psychologists, Isabel Mccarty, PhD,LP and Natacha Salgado, [place],  April 20: Guest Speaker: Health , Siena Martin, CH,CHES, CPT  May 18: Guest Speaker: Dietitian, Enoc Salinas, RD, " "LP  Huma 15: \"Let's Talk\" a time for the group to share.  July 20: \"Let's Talk\" a time for the group to share.  August 17: TBA  September 21: TBA  October 19: Guest Speaker: Dr Francesco Rodriguez MD Pulmonologist and Sleep Medicine Physician, \"Getting a Good Night's Sleep\".  November 16: TBA  December 21: TBA    [facility] and [facility] Support Groups    Connections: Bariatric Care Support Group?  This is open to all [facility] (and those external to this program) pre- and post- operative bariatric surgery patients as well as their support system.   WHEN: This group meets the 2nd Tuesday of each month from 6:30 PM - 8:00 PM virtually using Microsoft Teams.   FACILITATOR: Led by Jesus Gunderson, Ph.D who is a Licensed Psychologist with the [facility] Comprehensive Weight Management Program.   TO REGISTER: Please send an email to Jesus Gunderson, Ph.D., LP at?[email or web address]?if you would like an invitation to the group and to learn about using Microsoft Teams.    2022 Meetings  January 11: Rachel Ramey, PharmD, Pharmacy Resident at [facility], \"Medications and Bariatric Surgery\".  February 8: Open Forum  March 8  April 12  May 10  Huma 14    Connections: Post-Operative Bariatric Surgery Support Group  This is a support group for [facility] bariatric patients (and those external to [facility]) who have had bariatric surgery and are at least 3 months post-surgery.  WHEN: This support group meets the 4th Wednesday of the month from 11:00 AM - 12:00 PM virtually using Microsoft Teams.   FACILITATOR: Led by Certified Bariatric Nurse, Arielle Dominguez RN.   TO REGISTER: Please send an email to Arielle at [email or web address] if you would like an invitation to the group and to learn about using Microsoft Teams.    2022 Meetings  January 26  February 23  March 23  April 27  May 25  Huma 22    [facility] Healthy Lifestyle " "Virtual Support Group    Healthy Lifestyle Virtual Support Group?  This is 60 minutes of small group guided discussion, support and resources. All are welcome who want a healthy lifestyle.  WHEN: This group meets monthly on a Friday from 12:30 PM - 1:30 PM virtually using Microsoft Teams.   FACILITATOR: Led by National Board Certified Health and , Arielle Antonio [facility].   TO REGISTER: Please send an email to Arielle [email or web address] to receive monthly invites to the group or if you have any questions about having a health .  Prior to the meeting, a link with directions on how to join the meeting will be sent to you.    2022 Meetings  January 21: Brianne Conte MS, RN, CIC, CBN, \"Healthy Habits\"  February 25: Open Forum  March 18: \"Setting Limits and Boundaries\"  April 29: Nolvia Gonzalez RD, \"Meal Planning Made Easy\"  May 20: Open Forum  Huma: To be determined  ____________________________________________________________________________________________________________________________________________________________________________  [facility] Get Moving Program  Our team of physical therapists is trained to help you understand and take control of your condition. They will perform a thorough evaluation to determine your ability for activity and develop a customized plan to fit your goals and physical ability.  Scheduling: Unsure if the Get Moving program is right for you? Discuss the program with your medical provider or diabetes educator. You can also call us at [phone] to ask questions or schedule an appointment.   [facility] Get Moving Program  ____________________________________________________________________________________________________________________________________________________________________________  [facility] Diabetes Prevention Program (DPP)  If you have prediabetes and Medicare please contact us via MyChart to learn more about the Diabetes " Prevention Program (DPP)  Program Details:  [facility] offers the year-long Diabetes Prevention Program (DPP). The program helps you to make lifestyle changes that prevent or delay type 2 diabetes by supporting healthy eating, increased physical activity, stress reduction and use of coping skills.   On average, previous [facility] DPP cohorts have lost and maintained at least 5% of their starting weight throughout the program and averaged more than 150 minutes of physical activity per week.  Participants meet weekly for one-hour group sessions over sixteen weeks, every other week for the next 8 weeks, and monthly for the last six months.   A year-long maintenance program is also available for participants who complete the first year.   Location & Cost:   During the COVID-19 Public Health Emergency, the program is offered virtually. When in-person classes can resume, they will be held at [facility].  For people with Medicare, the program is covered in full. A self-pay option will also be available for those with non-Medicare insurance plans.   _________________________________________________________________________________________________________________________________________________________  Bluetooth Scale:    We hope to provide you with high quality virtual healthcare visits while social distancing for COVID-19 is necessary, as well as in the future when virtual visits may be more convenient for you.     Our technology team made it possible for Bluetooth scales to send weight measurements to our electronic medical record. This allows weights from you weighing at home to securely flow into the medical record, which will improve telephone and virtual visits.   Additionally, studies have shown that adults actually lose more weight when their weights are automatically sent to someone else, and also that this process is not stressful for those adults.    Below is a link  for purchasing the scale, with a discount code for our patients. You may call your insurance company to see if they will reimburse you for the cost of the scale, as a piece of durable medical equipment. The scales only go up to a weight of 400 pounds. This is an issue and we are working with the developer on increasing this. We found no scales that go over 400lb that have blue-tooth for connecting to [employer].    Scale to purchase: the [employer]  Body  Scale: https://[email or web address]/en/body/shop?[email or web address]&gclsrc=aw.ds    Discount Code: We have a discount code for our patients to bring the cost down to $50, Discount code is: [place]_Scale_20%off      Thank you,   [facility] Comprehensive Weight Management Team

## 2022-02-03 NOTE — NURSING NOTE
Chief Complaint   Patient presents with     Follow Up     Return after bariatric surgery.       Vitals:    02/03/22 1311   Weight: 163 lb       Body mass index is 28.87 kg/m .      Danii Rankin, EMT  Surgery Clinic

## 2022-03-28 ENCOUNTER — TELEPHONE (OUTPATIENT)
Dept: ENDOCRINOLOGY | Facility: CLINIC | Age: 22
End: 2022-03-28
Payer: COMMERCIAL

## 2022-03-28 NOTE — TELEPHONE ENCOUNTER
Patient no show for today's video visit. Sent text with link to join. Called pt, no answer on mom's cell. Main number listed not able to accept calls at this time per automated response. Sent MOLI message, no response. Closed video after 15 mins. Provided pt with number to reschedule.     GLYNN Logan, RD, LD  Clinic #: [phone]

## 2022-11-20 ENCOUNTER — HEALTH MAINTENANCE LETTER (OUTPATIENT)
Age: 22
End: 2022-11-20

## 2023-04-15 ENCOUNTER — HEALTH MAINTENANCE LETTER (OUTPATIENT)
Age: 23
End: 2023-04-15

## 2024-01-01 NOTE — TELEPHONE ENCOUNTER
Patient called to discuss getting appointment to see Dr. Gallegos. She has tentative surgery date of 7/27/21. She is currently in school in [place]. Scheduled in-person visit on 6/10 at 8:30 a.m.     Discussed COVID testing. She will be in [place] starting 7/21 and will be able to do COVID test Friday, 7/23.    FEVER

## 2024-06-22 ENCOUNTER — HEALTH MAINTENANCE LETTER (OUTPATIENT)
Age: 24
End: 2024-06-22

## 2024-09-04 NOTE — TELEPHONE ENCOUNTER
Called mom to go over impedence results which showed severe reflux and suboptimal acid control.  Asked mom to call back and leave a message with times that she is available to talk.  Also stated would try and call again later today.    Plan will be as follows:  Get fasting gastrin level  Other labs due to her obesity and history of elevated liver enzymes: A1C, CMP, Direct bili, INR, GGT, Cholesterol panel, CBC    After getting the Gastrin level would like her to increase the esomeprazole to 80 mg bid given the suboptimal acid suppression.  This will still keep her at <2 mg/kg/d for PPI dose   Hpi Title: Evaluation of Skin Lesions Additional History: X\\nEst pt here for eval of skin lesions. \\nPt has one spot of concern on his left foot

## (undated) DEVICE — JELLY LUBRICATING SURGILUBE 2OZ TUBE

## (undated) DEVICE — ESU GROUND PAD ADULT W/CORD E7507

## (undated) DEVICE — ENDO BITE BLOCK ADULT OMNI-BLOC

## (undated) DEVICE — KIT ENDO TURNOVER/PROCEDURE CARRY-ON 101822

## (undated) DEVICE — SOL WATER IRRIG 1000ML BOTTLE 2F7114

## (undated) DEVICE — DEVICE ENDO STITCH APPLIER 10MM 173016

## (undated) DEVICE — ESU ENDO SCISSORS 5MM CVD 5DCS

## (undated) DEVICE — KIT CONNECTOR FOR OLYMPUS ENDOSCOPES DEFENDO 100310

## (undated) DEVICE — SUCTION CATH AIRLIFE TRI-FLO W/CONTROL PORT 14FR  T60C

## (undated) DEVICE — TUBING SUCTION 10'X3/16" N510

## (undated) DEVICE — TUBING SUCTION 12"X1/4" N612

## (undated) DEVICE — ENDO FORCEP ENDOJAW BIOPSY 2.8MMX230CM FB-220U

## (undated) DEVICE — LINEN TOWEL PACK X30 5481

## (undated) DEVICE — SPECIMEN CONTAINER W/20ML 10% BUFF FORMALIN C4322-11

## (undated) DEVICE — Device

## (undated) DEVICE — PREP CHLORAPREP 26ML TINTED ORANGE  260815

## (undated) DEVICE — ENDO TUBING W/CAP AUXILARY WATER INLET 100609 EGA-500

## (undated) DEVICE — SUCTION MANIFOLD NEPTUNE 2 SYS 4 PORT 0702-020-000

## (undated) DEVICE — CATH PROBE PH/IMPEDENCE COMFORTECH Z/PH ZAN-BG-44

## (undated) DEVICE — STPL POWERED ECHELON LONG 60MM PLEE60A

## (undated) DEVICE — SU ENDO STITCH SURGIDAC 2-0 ES-9 7" TRIPLE STITCH 170041

## (undated) DEVICE — ENDO CAP AND TUBING STERILE FOR ENDOGATOR  100130

## (undated) DEVICE — DRSG TEGADERM 2 3/8X2 3/4" 1624W

## (undated) DEVICE — GLOVE PROTEXIS POWDER FREE SMT 7.5  2D72PT75X

## (undated) DEVICE — DECANTER VIAL 2006S

## (undated) DEVICE — ENDO BITE BLOCK PEDS BATRIK LATEX FREE B1

## (undated) DEVICE — DECANTER BAG 2002S

## (undated) DEVICE — ENDO TROCAR FIRST ENTRY KII FIOS Z-THRD 12X100MM CTF73

## (undated) DEVICE — SOL NACL 0.9% INJ 1000ML BAG 2B1324X

## (undated) DEVICE — TUBING SUCTION MEDI-VAC 1/4"X20' N620A

## (undated) DEVICE — GLOVE EXAM NITRILE LG PF LATEX FREE 5064

## (undated) DEVICE — NDL INSUFFLATION 13GA 150MM C2202

## (undated) DEVICE — ENDO SYSTEM WATER BOTTLE & TUBING W/CO2 FILTER 00711549

## (undated) DEVICE — SUCTION IRR STRYKERFLOW II W/TIP 250-070-520

## (undated) DEVICE — ENDO TROCAR FIRST ENTRY KII FIOS ADV FIX 05X100MM CFF03

## (undated) DEVICE — LINEN TOWEL PACK X6 WHITE 5487

## (undated) DEVICE — GOWN IMPERVIOUS 2XL BLUE

## (undated) DEVICE — STPL LINE REINFORCEMENT 60MM ECHELON ECH60R

## (undated) DEVICE — NDL SPINAL 22GA 3.5" QUINCKE 405181

## (undated) DEVICE — DRAIN PENROSE 0.25"X18" LATEX FREE GR201

## (undated) DEVICE — SYR 30ML SLIP TIP W/O NDL 302833

## (undated) DEVICE — KIT ENDO FIRST STEP DISINFECTANT 200ML W/POUCH EP-4

## (undated) DEVICE — ANTIFOG SOLUTION W/FOAM PAD 31142527

## (undated) DEVICE — SYR 30ML LL W/O NDL 302832

## (undated) DEVICE — STPL SKIN 35W ROTATING HEAD PRW35

## (undated) DEVICE — SUCTION MANIFOLD NEPTUNE 2 SYS 1 PORT 702-025-000

## (undated) DEVICE — SU ENDO STITCH POLYSORB 3-0 7" UND 170072

## (undated) DEVICE — ENDO TROCAR SLEEVE KII ADV FIXATION 05X100MM CFS02

## (undated) DEVICE — STPL RELOAD REG TISSUE ECHELON 60 X 3.6MM BLUE GST60B

## (undated) DEVICE — ESU HARMONIC ACE LAP SHEARS ETHICON ACE+ 7 5MMX45CM HARH45

## (undated) DEVICE — ENDO TROCAR OPTICAL ACCESS KII Z-THRD 12X60MM C0R83

## (undated) RX ORDER — DEXAMETHASONE SODIUM PHOSPHATE 4 MG/ML
INJECTION, SOLUTION INTRA-ARTICULAR; INTRALESIONAL; INTRAMUSCULAR; INTRAVENOUS; SOFT TISSUE
Status: DISPENSED
Start: 2021-09-24

## (undated) RX ORDER — CEFAZOLIN SODIUM 2 G/100ML
INJECTION, SOLUTION INTRAVENOUS
Status: DISPENSED
Start: 2021-09-24

## (undated) RX ORDER — FENTANYL CITRATE 50 UG/ML
INJECTION, SOLUTION INTRAMUSCULAR; INTRAVENOUS
Status: DISPENSED
Start: 2021-09-24

## (undated) RX ORDER — LIDOCAINE HYDROCHLORIDE 20 MG/ML
SOLUTION OROPHARYNGEAL
Status: DISPENSED
Start: 2020-10-29

## (undated) RX ORDER — PROPOFOL 10 MG/ML
INJECTION, EMULSION INTRAVENOUS
Status: DISPENSED
Start: 2021-09-24

## (undated) RX ORDER — ONDANSETRON 2 MG/ML
INJECTION INTRAMUSCULAR; INTRAVENOUS
Status: DISPENSED
Start: 2021-09-24

## (undated) RX ORDER — PROPOFOL 10 MG/ML
INJECTION, EMULSION INTRAVENOUS
Status: DISPENSED
Start: 2017-07-21

## (undated) RX ORDER — FENTANYL CITRATE-0.9 % NACL/PF 10 MCG/ML
PLASTIC BAG, INJECTION (ML) INTRAVENOUS
Status: DISPENSED
Start: 2021-09-24

## (undated) RX ORDER — LIDOCAINE HYDROCHLORIDE 20 MG/ML
INJECTION, SOLUTION EPIDURAL; INFILTRATION; INTRACAUDAL; PERINEURAL
Status: DISPENSED
Start: 2017-07-21

## (undated) RX ORDER — HYDROMORPHONE HCL IN WATER/PF 6 MG/30 ML
PATIENT CONTROLLED ANALGESIA SYRINGE INTRAVENOUS
Status: DISPENSED
Start: 2021-09-24

## (undated) RX ORDER — ONDANSETRON 2 MG/ML
INJECTION INTRAMUSCULAR; INTRAVENOUS
Status: DISPENSED
Start: 2017-07-21

## (undated) RX ORDER — HYDROMORPHONE HYDROCHLORIDE 1 MG/ML
INJECTION, SOLUTION INTRAMUSCULAR; INTRAVENOUS; SUBCUTANEOUS
Status: DISPENSED
Start: 2021-09-24

## (undated) RX ORDER — FENTANYL CITRATE 50 UG/ML
INJECTION, SOLUTION INTRAMUSCULAR; INTRAVENOUS
Status: DISPENSED
Start: 2017-07-21

## (undated) RX ORDER — EPHEDRINE SULFATE 50 MG/ML
INJECTION, SOLUTION INTRAMUSCULAR; INTRAVENOUS; SUBCUTANEOUS
Status: DISPENSED
Start: 2021-09-24

## (undated) RX ORDER — HYDRALAZINE HYDROCHLORIDE 20 MG/ML
INJECTION INTRAMUSCULAR; INTRAVENOUS
Status: DISPENSED
Start: 2021-09-24

## (undated) RX ORDER — DEXAMETHASONE SODIUM PHOSPHATE 4 MG/ML
INJECTION, SOLUTION INTRA-ARTICULAR; INTRALESIONAL; INTRAMUSCULAR; INTRAVENOUS; SOFT TISSUE
Status: DISPENSED
Start: 2017-07-21

## (undated) RX ORDER — DEXTROSE MONOHYDRATE, SODIUM CHLORIDE, AND POTASSIUM CHLORIDE 50; 1.49; 4.5 G/1000ML; G/1000ML; G/1000ML
INJECTION, SOLUTION INTRAVENOUS
Status: DISPENSED
Start: 2021-09-24

## (undated) RX ORDER — LIDOCAINE HYDROCHLORIDE 20 MG/ML
INJECTION, SOLUTION EPIDURAL; INFILTRATION; INTRACAUDAL; PERINEURAL
Status: DISPENSED
Start: 2021-09-24

## (undated) RX ORDER — OXYCODONE HYDROCHLORIDE 5 MG/1
TABLET ORAL
Status: DISPENSED
Start: 2021-09-24

## (undated) RX ORDER — GLYCOPYRROLATE 0.2 MG/ML
INJECTION, SOLUTION INTRAMUSCULAR; INTRAVENOUS
Status: DISPENSED
Start: 2021-09-24